# Patient Record
Sex: FEMALE | Race: BLACK OR AFRICAN AMERICAN | NOT HISPANIC OR LATINO | Employment: OTHER | URBAN - METROPOLITAN AREA
[De-identification: names, ages, dates, MRNs, and addresses within clinical notes are randomized per-mention and may not be internally consistent; named-entity substitution may affect disease eponyms.]

---

## 2018-07-31 ENCOUNTER — HOSPITAL ENCOUNTER (EMERGENCY)
Facility: HOSPITAL | Age: 58
Discharge: HOME | End: 2018-07-31
Attending: EMERGENCY MEDICINE
Payer: MEDICAID

## 2018-07-31 VITALS
OXYGEN SATURATION: 98 % | RESPIRATION RATE: 20 BRPM | DIASTOLIC BLOOD PRESSURE: 76 MMHG | WEIGHT: 180 LBS | HEIGHT: 65 IN | TEMPERATURE: 99 F | SYSTOLIC BLOOD PRESSURE: 147 MMHG | HEART RATE: 88 BPM | BODY MASS INDEX: 29.99 KG/M2

## 2018-07-31 DIAGNOSIS — J06.9 UPPER RESPIRATORY TRACT INFECTION, UNSPECIFIED TYPE: Primary | ICD-10-CM

## 2018-07-31 DIAGNOSIS — H10.33 ACUTE CONJUNCTIVITIS OF BOTH EYES, UNSPECIFIED ACUTE CONJUNCTIVITIS TYPE: ICD-10-CM

## 2018-07-31 LAB — POCT GLUCOSE: 288 MG/DL (ref 70–110)

## 2018-07-31 PROCEDURE — 99283 EMERGENCY DEPT VISIT LOW MDM: CPT | Mod: 25

## 2018-07-31 PROCEDURE — 96372 THER/PROPH/DIAG INJ SC/IM: CPT

## 2018-07-31 PROCEDURE — 63600175 PHARM REV CODE 636 W HCPCS: Performed by: EMERGENCY MEDICINE

## 2018-07-31 RX ORDER — ASPIRIN 81 MG/1
81 TABLET ORAL DAILY
COMMUNITY

## 2018-07-31 RX ORDER — ALBUTEROL SULFATE 1.25 MG/3ML
1.25 SOLUTION RESPIRATORY (INHALATION) EVERY 6 HOURS PRN
COMMUNITY
End: 2020-10-20 | Stop reason: DRUGHIGH

## 2018-07-31 RX ORDER — DEXAMETHASONE SODIUM PHOSPHATE 4 MG/ML
2 INJECTION, SOLUTION INTRA-ARTICULAR; INTRALESIONAL; INTRAMUSCULAR; INTRAVENOUS; SOFT TISSUE
Status: COMPLETED | OUTPATIENT
Start: 2018-07-31 | End: 2018-07-31

## 2018-07-31 RX ORDER — BENZONATATE 100 MG/1
100 CAPSULE ORAL 3 TIMES DAILY PRN
Qty: 20 CAPSULE | Refills: 0 | Status: SHIPPED | OUTPATIENT
Start: 2018-07-31 | End: 2018-08-10

## 2018-07-31 RX ORDER — SULFACETAMIDE SODIUM 100 MG/ML
2 SOLUTION/ DROPS OPHTHALMIC 4 TIMES DAILY
Qty: 1 BOTTLE | Status: SHIPPED | OUTPATIENT
Start: 2018-07-31 | End: 2019-05-09

## 2018-07-31 RX ORDER — MELOXICAM 7.5 MG/1
7.5 TABLET ORAL DAILY
COMMUNITY
End: 2021-07-20

## 2018-07-31 RX ORDER — ENALAPRIL MALEATE 2.5 MG/1
2.5 TABLET ORAL DAILY
COMMUNITY
End: 2020-10-20 | Stop reason: DRUGHIGH

## 2018-07-31 RX ORDER — METFORMIN HYDROCHLORIDE 1000 MG/1
1000 TABLET, FILM COATED, EXTENDED RELEASE ORAL
COMMUNITY
End: 2022-04-04

## 2018-07-31 RX ORDER — GABAPENTIN 300 MG/1
300 CAPSULE ORAL 3 TIMES DAILY
COMMUNITY
End: 2020-01-28

## 2018-07-31 RX ORDER — AMLODIPINE BESYLATE 5 MG/1
5 TABLET ORAL DAILY
COMMUNITY
End: 2020-10-20 | Stop reason: SDUPTHER

## 2018-07-31 RX ORDER — CETIRIZINE HYDROCHLORIDE 10 MG/1
10 TABLET ORAL DAILY
COMMUNITY

## 2018-07-31 RX ADMIN — DEXAMETHASONE SODIUM PHOSPHATE 2 MG: 4 INJECTION, SOLUTION INTRAMUSCULAR; INTRAVENOUS at 05:07

## 2018-07-31 NOTE — ED PROVIDER NOTES
"Encounter Date: 7/31/2018       History     Chief Complaint   Patient presents with    URI     Pt reports eye irritation, bilateral ear pain, sore throat that began last night. Taking tylenol cold and flu with some relief.      Chief complaint:  Sore throat  57-year-old who complains of a sore throat associated with nasal congestion and itchy eyes.  Patient's symptoms began last night.  No fever.  Patient reports sinus pressure.  She has been blowing her nose and has had yellow nasal drainage with blood streaks.  Patient took Tylenol cold and Sinus without improvement.  She denies difficulty breathing or chest pain.          Review of patient's allergies indicates:   Allergen Reactions    Codeine Other (See Comments)     "it makes me feel crazy"    Prednisone Palpitations     "it makes my heart beat fast. I can take the shot"     Past Medical History:   Diagnosis Date    Back pain     Diabetes mellitus     Hypertension      Past Surgical History:   Procedure Laterality Date    HYSTERECTOMY       History reviewed. No pertinent family history.  Social History   Substance Use Topics    Smoking status: Former Smoker    Smokeless tobacco: Not on file    Alcohol use No     Review of Systems   Constitutional: Negative for fever.   HENT: Positive for congestion, sinus pressure and sore throat.    Eyes: Positive for discharge (Yellow) and itching.   Respiratory: Positive for cough. Negative for shortness of breath.    Cardiovascular: Negative for chest pain.   Gastrointestinal: Negative for nausea.   Genitourinary: Negative for dysuria.   Musculoskeletal: Negative for back pain.   Skin: Negative for rash.   Neurological: Positive for headaches. Negative for weakness.       Physical Exam     Initial Vitals [07/31/18 1710]   BP Pulse Resp Temp SpO2   (!) 185/95 89 19 98.5 °F (36.9 °C) 100 %      MAP       --         Physical Exam    Nursing note and vitals reviewed.  Constitutional: She appears well-developed and " well-nourished.   HENT:   Head: Normocephalic and atraumatic.   Right Ear: Tympanic membrane normal.   Left Ear: Tympanic membrane normal.   Nose: Nose normal.   Mouth/Throat: Oropharynx is clear and moist. No oropharyngeal exudate, posterior oropharyngeal edema or posterior oropharyngeal erythema.   Eyes: EOM are normal. Pupils are equal, round, and reactive to light. Lids are everted and swept, no foreign bodies found. Right conjunctiva is injected. Left conjunctiva is injected.   Neck: Trachea normal, normal range of motion and full passive range of motion without pain. Neck supple.   Cardiovascular: Normal rate and regular rhythm.   Pulmonary/Chest: Breath sounds normal.   Abdominal: Soft. There is no tenderness. There is no rebound and no guarding.   Musculoskeletal: Normal range of motion.   Neurological: She is alert and oriented to person, place, and time. She has normal strength.   Skin: Skin is warm and dry.   Psychiatric: She has a normal mood and affect.         ED Course   Procedures  Labs Reviewed   POCT GLUCOSE - Abnormal; Notable for the following:        Result Value    POCT Glucose 288 (*)     All other components within normal limits   POCT GLUCOSE          Imaging Results    None          Medical Decision Making:   Initial Assessment:   57-year-old complains of URI type symptoms since yesterday.  On exam patient has mild erythema to her conjunctiva.  Lungs are clear.  No erythema to posterior pharynx  ED Management:  Patient does not require antibiotics.  She was hoping that she was going to be prescribed antibiotic.  However patient's symptoms began last night appear to be viral.  She is using Naphcon-A eyedrops and was advised to continue using these as well as Flonase.  She was also advised to use Coricidin for her symptoms. She will given 2 mg of Decadron and prescribed sulfacetamide eyedrops and Tessalon Perles                      Clinical Impression:   The primary encounter diagnosis was  Upper respiratory tract infection, unspecified type. A diagnosis of Acute conjunctivitis of both eyes, unspecified acute conjunctivitis type was also pertinent to this visit.                             Mi Anand MD  07/31/18 0832

## 2019-05-09 ENCOUNTER — HOSPITAL ENCOUNTER (OUTPATIENT)
Dept: RADIOLOGY | Facility: HOSPITAL | Age: 59
Discharge: HOME | End: 2019-05-09
Attending: INTERNAL MEDICINE
Payer: MEDICARE

## 2019-05-09 ENCOUNTER — OFFICE VISIT (OUTPATIENT)
Dept: RHEUMATOLOGY | Facility: CLINIC | Age: 59
End: 2019-05-09
Payer: MEDICARE

## 2019-05-09 VITALS
SYSTOLIC BLOOD PRESSURE: 132 MMHG | HEART RATE: 81 BPM | DIASTOLIC BLOOD PRESSURE: 77 MMHG | BODY MASS INDEX: 30.04 KG/M2 | HEIGHT: 66 IN | WEIGHT: 186.94 LBS

## 2019-05-09 DIAGNOSIS — M06.9 RHEUMATOID ARTHRITIS INVOLVING MULTIPLE SITES, UNSPECIFIED RHEUMATOID FACTOR PRESENCE: ICD-10-CM

## 2019-05-09 DIAGNOSIS — M06.9 RHEUMATOID ARTHRITIS INVOLVING MULTIPLE SITES, UNSPECIFIED RHEUMATOID FACTOR PRESENCE: Primary | ICD-10-CM

## 2019-05-09 PROCEDURE — 99204 PR OFFICE/OUTPT VISIT, NEW, LEVL IV, 45-59 MIN: ICD-10-PCS | Mod: S$GLB,,, | Performed by: INTERNAL MEDICINE

## 2019-05-09 PROCEDURE — 77077 JOINT SURVEY SINGLE VIEW: CPT | Mod: TC

## 2019-05-09 PROCEDURE — 99999 PR PBB SHADOW E&M-EST. PATIENT-LVL III: ICD-10-PCS | Mod: PBBFAC,,, | Performed by: INTERNAL MEDICINE

## 2019-05-09 PROCEDURE — 71046 X-RAY EXAM CHEST 2 VIEWS: CPT | Mod: 26,,, | Performed by: RADIOLOGY

## 2019-05-09 PROCEDURE — 99204 OFFICE O/P NEW MOD 45 MIN: CPT | Mod: S$GLB,,, | Performed by: INTERNAL MEDICINE

## 2019-05-09 PROCEDURE — 3008F PR BODY MASS INDEX (BMI) DOCUMENTED: ICD-10-PCS | Mod: CPTII,S$GLB,, | Performed by: INTERNAL MEDICINE

## 2019-05-09 PROCEDURE — 71046 XR CHEST PA AND LATERAL: ICD-10-PCS | Mod: 26,,, | Performed by: RADIOLOGY

## 2019-05-09 PROCEDURE — 77077 XR ARTHRITIS SURVEY: ICD-10-PCS | Mod: 26,,, | Performed by: RADIOLOGY

## 2019-05-09 PROCEDURE — 99999 PR PBB SHADOW E&M-EST. PATIENT-LVL III: CPT | Mod: PBBFAC,,, | Performed by: INTERNAL MEDICINE

## 2019-05-09 PROCEDURE — 77077 JOINT SURVEY SINGLE VIEW: CPT | Mod: 26,,, | Performed by: RADIOLOGY

## 2019-05-09 PROCEDURE — 71046 X-RAY EXAM CHEST 2 VIEWS: CPT | Mod: TC

## 2019-05-09 PROCEDURE — 3008F BODY MASS INDEX DOCD: CPT | Mod: CPTII,S$GLB,, | Performed by: INTERNAL MEDICINE

## 2019-05-09 RX ORDER — METHOTREXATE 2.5 MG/1
TABLET ORAL
Refills: 0 | COMMUNITY
Start: 2019-04-03 | End: 2020-10-20

## 2019-05-09 RX ORDER — ADALIMUMAB 40MG/0.8ML
40 KIT SUBCUTANEOUS
Qty: 2 PEN | Refills: 11 | Status: SHIPPED | OUTPATIENT
Start: 2019-05-09 | End: 2020-01-28

## 2019-05-09 RX ORDER — PREDNISONE 10 MG/1
TABLET ORAL
Qty: 100 TABLET | Refills: 0 | Status: SHIPPED | OUTPATIENT
Start: 2019-05-09 | End: 2020-10-20

## 2019-05-09 RX ORDER — OMEPRAZOLE 40 MG/1
CAPSULE, DELAYED RELEASE ORAL
Refills: 5 | COMMUNITY
Start: 2019-04-10

## 2019-05-09 RX ORDER — ATORVASTATIN CALCIUM 20 MG/1
TABLET, FILM COATED ORAL
Refills: 1 | COMMUNITY
Start: 2019-04-06 | End: 2022-08-15

## 2019-05-09 RX ORDER — PEN NEEDLE, DIABETIC 31 GX5/16"
NEEDLE, DISPOSABLE MISCELLANEOUS
Refills: 11 | COMMUNITY
Start: 2019-04-30 | End: 2022-04-04 | Stop reason: SDUPTHER

## 2019-05-09 RX ORDER — INSULIN ASPART 100 [IU]/ML
INJECTION, SOLUTION INTRAVENOUS; SUBCUTANEOUS
Refills: 3 | COMMUNITY
Start: 2019-05-01 | End: 2022-04-04

## 2019-05-09 RX ORDER — INSULIN GLARGINE 100 [IU]/ML
INJECTION, SOLUTION SUBCUTANEOUS
Refills: 5 | COMMUNITY
Start: 2019-04-26 | End: 2022-03-17

## 2019-05-09 ASSESSMENT — ROUTINE ASSESSMENT OF PATIENT INDEX DATA (RAPID3)
PATIENT GLOBAL ASSESSMENT SCORE: 8
FATIGUE SCORE: 8
WHEN YOU AWAKENED IN THE MORNING OVER THE LAST WEEK, PLEASE INDICATE THE AMOUNT OF TIME IT TAKES UNTIL YOU ARE AS LIMBER AS YOU WILL BE FOR THE DAY: 2-4 HOURS
AM STIFFNESS SCORE: 1, YES
MDHAQ FUNCTION SCORE: 1.7
PSYCHOLOGICAL DISTRESS SCORE: 8

## 2019-05-09 NOTE — PROGRESS NOTES
Chief Complaint   Patient presents with    Disease Management       Patient was referred by : PCP    History of presenting illness    58 year old black female comes in with a new diagnosis of rheumatoid arthritis    She has joint pains for 2 years    Feet hurt  Hands hurt  Neck hurts    Elbows,shoulders,wrists can hurt some  Back can hurt some    Pain,swelling,stiffness+  EMS+    Right 2nd finger is stiff and doesn't bend    No skin rashes,malar rash,photosensitivity  No telangiectasias  No calcinosis     No patchy alopecia  No oral and nasal ulcers  No sicca symptoms   No pleurisy or any cardiopulmonary complaints  No dysphagia,diplopia and dysphonia and muscle weakness  No n/v/d/c  No acid reflux+  No raynaud's+  No digital ulcers     No cytopenias  No renal issues  No blood clots     No fever,chills,night sweats,weight loss and loss of appetite     No pregnancy losses    A PCP diagnosed her with rheumatoid arthritis  Sent her to rheumatology on napolean : got mtx : 4 pills weekly/folic acid  She has headaches and she cant tolerate it   She had a rash    Past history : dm,htn    Family history : mom has arthritis     Social history : former smoker,quit 20 years ago        Review of Systems   Constitutional: Negative for activity change, appetite change, chills, diaphoresis, fatigue, fever and unexpected weight change.   HENT: Negative for congestion, dental problem, drooling, ear discharge, ear pain, facial swelling, hearing loss, mouth sores, nosebleeds, postnasal drip, rhinorrhea, sinus pressure, sinus pain, sneezing, sore throat, tinnitus, trouble swallowing and voice change.    Eyes: Negative for photophobia, pain, discharge, redness, itching and visual disturbance.   Respiratory: Negative for apnea, cough, choking, chest tightness, shortness of breath, wheezing and stridor.    Cardiovascular: Negative for chest pain, palpitations and leg swelling.   Gastrointestinal: Negative for abdominal distention,  abdominal pain, anal bleeding, blood in stool, constipation, diarrhea, nausea, rectal pain and vomiting.   Endocrine: Negative for cold intolerance, heat intolerance, polydipsia, polyphagia and polyuria.   Genitourinary: Negative for decreased urine volume, difficulty urinating, dysuria, enuresis, flank pain, frequency, genital sores, hematuria and urgency.   Musculoskeletal: Positive for arthralgias. Negative for back pain, gait problem, joint swelling, myalgias, neck pain and neck stiffness.   Skin: Negative for color change, pallor, rash and wound.   Allergic/Immunologic: Negative for environmental allergies, food allergies and immunocompromised state.   Neurological: Negative for dizziness, tremors, seizures, syncope, facial asymmetry, speech difficulty, weakness, light-headedness, numbness and headaches.   Hematological: Negative for adenopathy. Does not bruise/bleed easily.   Psychiatric/Behavioral: Negative for agitation, behavioral problems, confusion, decreased concentration, dysphoric mood, hallucinations, self-injury, sleep disturbance and suicidal ideas. The patient is not nervous/anxious and is not hyperactive.      Physical Exam     Tenderness:   RUE: glenohumeral  LUE: glenohumeral  Right hand: 2nd MCP, 3rd MCP, 4th MCP, 5th MCP, 2nd PIP, 3rd PIP and 4th PIP  Left hand: 2nd MCP, 3rd MCP, 4th MCP, 5th MCP, 2nd PIP, 3rd PIP and 4th PIP  LLE: tibiotalar    Swelling:   Right hand: 2nd PIP  Left hand: 2nd MCP, 3rd MCP, 3rd PIP and 4th PIP  LLE: tibiotalar    RAMIREZ-28 tender joint count: 16  RAMIREZ-28 swollen joint count: 5    PAIN 8  FATIGUE 8  PTGL 8  EMS All day    Tender neck with limited ROM    Physical Exam   Constitutional: She is oriented to person, place, and time and well-developed, well-nourished, and in no distress. No distress.   HENT:   Head: Normocephalic.   Mouth/Throat: Oropharynx is clear and moist.   Eyes: Conjunctivae are normal. Pupils are equal, round, and reactive to light. Right eye  exhibits no discharge. Left eye exhibits no discharge. No scleral icterus.   Neck: Normal range of motion. No thyromegaly present.   Cardiovascular: Normal rate, regular rhythm, normal heart sounds and intact distal pulses.    Pulmonary/Chest: Effort normal and breath sounds normal. No stridor.   Abdominal: Soft. Bowel sounds are normal.   Lymphadenopathy:     She has no cervical adenopathy.   Neurological: She is alert and oriented to person, place, and time.   Skin: Skin is warm. No rash noted. She is not diaphoretic.     Psychiatric: Affect and judgment normal.   Musculoskeletal: Normal range of motion.         Assessment     58 year old black female with DM,HTN comes in with a new diagnosis of rheumatoid arthritis    She has joint pains for 2 years    Feet hurt  Hands hurt  Neck hurts    Elbows,shoulders,wrists can hurt some  Back can hurt some    Pain,swelling,stiffness+  EMS+    Right 2nd finger is stiff and doesn't bend    A PCP diagnosed her with rheumatoid arthritis  Sent her to rheumatology on napolean : got mtx : 4 pills weekly/folic acid  She has headaches and she cant tolerate it and has a rash with it     She needs to establish care for rheumatoid arthritis   We need to treat her with dmards  She has very high disease activity    1. Rheumatoid arthritis involving multiple sites, unspecified rheumatoid factor presence            New problem     Plan      Autoimmune labs     Predmard labs     Arthritis survey  CXR    Prednisone 40 mg daily and taper   Humira 40 mg alternate weekly   Discussed side effects    Marisol was seen today for disease management.    Diagnoses and all orders for this visit:    Rheumatoid arthritis involving multiple sites, unspecified rheumatoid factor presence  -     CBC auto differential; Future  -     Comprehensive metabolic panel; Future  -     Sedimentation rate; Future  -     C-reactive protein; Future  -     Rheumatoid factor; Future  -     Cyclic citrul peptide antibody, IgG;  Future  -     HLA B27 Antigen; Future  -     HIV 1/2 Ag/Ab (4th Gen); Future  -     Hepatitis B surface antigen; Future  -     HBcAB; Future  -     Hepatitis B surface antibody; Future  -     Hepatitis C antibody; Future  -     Hepatitis A antibody, IgG; Future  -     Strongyloides IgG Antibodies; Future  -     Quantiferon Gold TB; Future  -     RPR; Future  -     Varicella zoster antibody, IgG; Future  -     XR Arthritis Survey; Future  -     X-Ray Chest PA And Lateral; Future  -     Uric acid; Future  -     ULISES Screen w/Reflex; Future  -     Sjogrens syndrome-A extractable nuclear antibody; Future  -     adalimumab (HUMIRA PEN) 40 mg/0.8 mL PnKt; Inject 1 pen (40 mg total) into the skin every 14 (fourteen) days.    Other orders  -     predniSONE (DELTASONE) 10 MG tablet; 40 mg daily for a week 30 mg daily for a week 20 mg daily for a week 10 mg daily for a week        rtc in 3 months

## 2019-05-10 ENCOUNTER — TELEPHONE (OUTPATIENT)
Dept: PHARMACY | Facility: CLINIC | Age: 59
End: 2019-05-10

## 2019-05-14 NOTE — PROGRESS NOTES
Patient cant take prednisone  Suggested to wait till she gets the humira  PCP can give a steroid shot at the office

## 2019-05-14 NOTE — TELEPHONE ENCOUNTER
DOCUMENTATION ONLY:  Prior authorization for Humira approved until 12/31/19    Co-pay: $3.80    Patient Assistance is not required.

## 2019-05-17 ENCOUNTER — TELEPHONE (OUTPATIENT)
Dept: PHARMACY | Facility: CLINIC | Age: 59
End: 2019-05-17

## 2019-05-17 NOTE — TELEPHONE ENCOUNTER
Initial Humira 40mg/0.8ml Pen Injection consult completed in-person on .Humira 40mg/0.8ml Pen Injection was picked up on . $3.80 copay. Patient will start Humira 40mg/0.8ml Pen Injection on . Address confirmed. Confirmed 2 patient identifiers - name and . Therapy Appropriate.    Counseled patient on administration directions:  -  Inject Humira 40mg (1pen) into the skin every 14 days.- Take out of the refrigerator 30-60 minutes prior to injection.  - Wash hands before and after injection.  - Monthly RX will come with gauze, bandaids, and alcohol swabs.  - Patient may inject in either the tops of the thighs, abdomen- but at least 2 inches away from her belly button, or the outer part of her upper arm.  Patient was instructed to rotate injections sites.  - Patient is to wipe down the injection site with the alcohol pad, wait to dry.  Gently squeeze the area of the cleaned skin and hold it firmly.   Place the pen flat against the raised area of skin that is being squeezed, then push down on the button and hold for 10-15 seconds, until the window has gone from clear to yellow.  - Patient should rotate injection sites.   - Patient will use sharps container; once full, per [place] law, she may lock the sharps container and place in her trash. She can then contact the Pharmacy and we will replace the sharps at no additional charge.    Patient was counseled on possible side effects:  - Injection site reaction: redness, soreness, itching, bruising, which should resolve within 3-5 days.   - Increased risk for infections.  If patient becomes sick, patient is to hold Humira  use until she is better.     Patient verbalized understanding. Compliance stressed. Patient advised to keep a calendar marking dates of injections to ensure better compliance. Patient advised to call myself or provider should any questions arise. Patient plans to start Humira on . Consultation included: indication; goals of treatment;  administration; storage and handling; side effects; how to handle side effects; the importance of compliance; how to handle missed doses; the importance of laboratory monitoring; the importance of keeping all follow up appointments.  Patient understands to report any medication changes to [facility] and provider. All questions answered and addressed to patients satisfaction. Rph will f/u with her in 1 week from start, [facility] to contact patient in 3 weeks for refills.

## 2019-05-30 DIAGNOSIS — M05.79 RHEUMATOID ARTHRITIS INVOLVING MULTIPLE SITES WITH POSITIVE RHEUMATOID FACTOR: Primary | ICD-10-CM

## 2019-05-30 RX ORDER — ADALIMUMAB 40MG/0.8ML
40 KIT SUBCUTANEOUS
Qty: 1 PEN | Refills: 0 | Status: SHIPPED | OUTPATIENT
Start: 2019-05-30 | End: 2019-06-29

## 2019-06-12 ENCOUNTER — TELEPHONE (OUTPATIENT)
Dept: PHARMACY | Facility: CLINIC | Age: 59
End: 2019-06-12

## 2019-07-02 ENCOUNTER — OFFICE VISIT (OUTPATIENT)
Dept: INFECTIOUS DISEASES | Facility: CLINIC | Age: 59
End: 2019-07-02
Payer: MEDICARE

## 2019-07-02 ENCOUNTER — CLINICAL SUPPORT (OUTPATIENT)
Dept: INFECTIOUS DISEASES | Facility: CLINIC | Age: 59
End: 2019-07-02
Payer: MEDICARE

## 2019-07-02 VITALS
BODY MASS INDEX: 30.62 KG/M2 | DIASTOLIC BLOOD PRESSURE: 83 MMHG | HEART RATE: 89 BPM | WEIGHT: 190.5 LBS | SYSTOLIC BLOOD PRESSURE: 143 MMHG | TEMPERATURE: 98 F | HEIGHT: 66 IN

## 2019-07-02 DIAGNOSIS — Z23 NEED FOR VACCINATION WITH TWINRIX: ICD-10-CM

## 2019-07-02 DIAGNOSIS — Z23 NEED FOR ZOSTER VACCINATION: ICD-10-CM

## 2019-07-02 DIAGNOSIS — M05.742 RHEUMATOID ARTHRITIS INVOLVING BOTH HANDS WITH POSITIVE RHEUMATOID FACTOR: ICD-10-CM

## 2019-07-02 DIAGNOSIS — M05.741 RHEUMATOID ARTHRITIS INVOLVING BOTH HANDS WITH POSITIVE RHEUMATOID FACTOR: ICD-10-CM

## 2019-07-02 DIAGNOSIS — Z23 NEED FOR TETANUS BOOSTER: ICD-10-CM

## 2019-07-02 DIAGNOSIS — Z23 NEED FOR STREPTOCOCCUS PNEUMONIAE VACCINATION: ICD-10-CM

## 2019-07-02 PROCEDURE — G0009 TD VACCINE GREATER THAN OR EQUAL TO 7YO PRESERVATIVE FREE IM: ICD-10-PCS | Mod: 59,S$GLB,, | Performed by: INTERNAL MEDICINE

## 2019-07-02 PROCEDURE — 90636 HEP A/HEP B VACC ADULT IM: CPT | Mod: S$GLB,,, | Performed by: INTERNAL MEDICINE

## 2019-07-02 PROCEDURE — 3008F PR BODY MASS INDEX (BMI) DOCUMENTED: ICD-10-PCS | Mod: CPTII,S$GLB,, | Performed by: INTERNAL MEDICINE

## 2019-07-02 PROCEDURE — 90670 PCV13 VACCINE IM: CPT | Mod: S$GLB,,, | Performed by: INTERNAL MEDICINE

## 2019-07-02 PROCEDURE — 90472 PNEUMOCOCCAL CONJUGATE VACCINE 13-VALENT LESS THAN 5YO & GREATER THAN: ICD-10-PCS | Mod: S$GLB,,, | Performed by: INTERNAL MEDICINE

## 2019-07-02 PROCEDURE — 3008F BODY MASS INDEX DOCD: CPT | Mod: CPTII,S$GLB,, | Performed by: INTERNAL MEDICINE

## 2019-07-02 PROCEDURE — 90472 IMMUNIZATION ADMIN EACH ADD: CPT | Mod: S$GLB,,, | Performed by: INTERNAL MEDICINE

## 2019-07-02 PROCEDURE — 90636 HEPATITIS A HEPATITIS B COMBINED VACCINE IM: ICD-10-PCS | Mod: S$GLB,,, | Performed by: INTERNAL MEDICINE

## 2019-07-02 PROCEDURE — 90471 HEPATITIS A HEPATITIS B COMBINED VACCINE IM: ICD-10-PCS | Mod: S$GLB,,, | Performed by: INTERNAL MEDICINE

## 2019-07-02 PROCEDURE — 99999 PR PBB SHADOW E&M-EST. PATIENT-LVL IV: CPT | Mod: PBBFAC,,, | Performed by: INTERNAL MEDICINE

## 2019-07-02 PROCEDURE — G0009 ADMIN PNEUMOCOCCAL VACCINE: HCPCS | Mod: 59,S$GLB,, | Performed by: INTERNAL MEDICINE

## 2019-07-02 PROCEDURE — 90714 TD VACCINE GREATER THAN OR EQUAL TO 7YO PRESERVATIVE FREE IM: ICD-10-PCS | Mod: S$GLB,,, | Performed by: INTERNAL MEDICINE

## 2019-07-02 PROCEDURE — 90714 TD VACC NO PRESV 7 YRS+ IM: CPT | Mod: S$GLB,,, | Performed by: INTERNAL MEDICINE

## 2019-07-02 PROCEDURE — 99999 PR PBB SHADOW E&M-EST. PATIENT-LVL IV: ICD-10-PCS | Mod: PBBFAC,,, | Performed by: INTERNAL MEDICINE

## 2019-07-02 PROCEDURE — 90670 PNEUMOCOCCAL CONJUGATE VACCINE 13-VALENT LESS THAN 5YO & GREATER THAN: ICD-10-PCS | Mod: S$GLB,,, | Performed by: INTERNAL MEDICINE

## 2019-07-02 PROCEDURE — 90471 IMMUNIZATION ADMIN: CPT | Mod: S$GLB,,, | Performed by: INTERNAL MEDICINE

## 2019-07-02 PROCEDURE — 99203 PR OFFICE/OUTPT VISIT, NEW, LEVL III, 30-44 MIN: ICD-10-PCS | Mod: 25,S$GLB,, | Performed by: INTERNAL MEDICINE

## 2019-07-02 PROCEDURE — 99203 OFFICE O/P NEW LOW 30 MIN: CPT | Mod: 25,S$GLB,, | Performed by: INTERNAL MEDICINE

## 2019-07-02 NOTE — PROGRESS NOTES
received Td, Prevnar 13 and Twinrix(first dose) vaccine. Tolerated well. Return appointment scheduled. Left unit in NAD.

## 2019-07-02 NOTE — PROGRESS NOTES
Subjective:      Patient ID: Marisol Rosales is a 58 y.o. female.    Chief Complaint:Immunizations      History of Present Illness    Recently started Humira.  She is taking her 5th dose this Wednesday.  She is sent for immunization evaluation.    Review of Systems   Constitution: Negative for chills, decreased appetite, fever, malaise/fatigue, night sweats, weight gain and weight loss.   HENT: Negative for congestion, ear pain, hearing loss, hoarse voice, sore throat and tinnitus.    Eyes: Negative for blurred vision, redness and visual disturbance.   Cardiovascular: Negative for chest pain, leg swelling and palpitations.   Respiratory: Positive for wheezing. Negative for cough, hemoptysis, shortness of breath and sputum production.    Hematologic/Lymphatic: Negative for adenopathy. Does not bruise/bleed easily.   Skin: Negative for dry skin, itching, rash and suspicious lesions.   Musculoskeletal: Positive for joint pain. Negative for myalgias and neck pain.   Gastrointestinal: Negative for abdominal pain, constipation, diarrhea, heartburn, nausea and vomiting.   Genitourinary: Negative for dysuria, flank pain, frequency, hematuria, hesitancy and urgency.   Neurological: Negative for dizziness, headaches, numbness, paresthesias and weakness.   Psychiatric/Behavioral: Negative for depression and memory loss. The patient does not have insomnia and is not nervous/anxious.      Objective:   Physical Exam   Constitutional: She appears well-developed and well-nourished.   HENT:   Head: Normocephalic and atraumatic.   Eyes: Pupils are equal, round, and reactive to light. Conjunctivae and EOM are normal. No scleral icterus.   Cardiovascular: Normal rate, regular rhythm and normal heart sounds.   Pulmonary/Chest: Effort normal and breath sounds normal.   Abdominal: Soft. Bowel sounds are normal.   Musculoskeletal:   Tenderness and effusion are noted in both hands at the MCP joints.   Skin: Skin is warm.   Nursing note and  vitals reviewed.    Assessment:       1. Rheumatoid arthritis involving both hands with positive rheumatoid factor    2. Need for Streptococcus pneumoniae vaccination    3. Need for vaccination with Twinrix    4. Need for zoster vaccination    5. Need for tetanus booster          Plan:       Vaccination with Prevnar 13, Twinrix, tetanus booster today.  She is given a prescription for Shingrix vaccination.  Her laboratories were all reviewed; there is no evidence of infection with tuberculosis or Strongyloides requiring treatment.  She may proceed with Humira treatment.

## 2019-07-02 NOTE — LETTER
July 2, 2019      Curly Esparza MD  [address] [facility] [address]           [facility] - Infectious Diseases  [address]  [place] [address]  Phone: [phone]  Fax: [phone]          Patient: Marisol Rosales   MR Number: [id number]   YOB: 1960   Date of Visit: 7/2/2019       Dear Dr. Curly Esparza:    Thank you for referring Marisol Rosales to me for evaluation. Attached you will find relevant portions of my assessment and plan of care.    If you have questions, please do not hesitate to call me. I look forward to following Marisol Rosales along with you.    Sincerely,    Kana Pablo MD    Enclosure  CC:  No Recipients    If you would like to receive this communication electronically, please contact [email or web address] or [phone] to request more information on [facility] Link access.    For providers and/or their staff who would like to refer a patient to [facility], please contact us through our one-stop-shop provider referral line, [facility], at [phone].    If you feel you have received this communication in error or would no longer like to receive these types of communications, please e-mail [email or web address]

## 2019-08-06 ENCOUNTER — CLINICAL SUPPORT (OUTPATIENT)
Dept: INFECTIOUS DISEASES | Facility: CLINIC | Age: 59
End: 2019-08-06
Payer: MEDICARE

## 2019-08-06 PROCEDURE — 90636 HEPATITIS A HEPATITIS B COMBINED VACCINE IM: ICD-10-PCS | Mod: S$GLB,,, | Performed by: INTERNAL MEDICINE

## 2019-08-06 PROCEDURE — 90636 HEP A/HEP B VACC ADULT IM: CPT | Mod: S$GLB,,, | Performed by: INTERNAL MEDICINE

## 2019-08-06 PROCEDURE — 90471 HEPATITIS A HEPATITIS B COMBINED VACCINE IM: ICD-10-PCS | Mod: S$GLB,,, | Performed by: INTERNAL MEDICINE

## 2019-08-06 PROCEDURE — 90471 IMMUNIZATION ADMIN: CPT | Mod: S$GLB,,, | Performed by: INTERNAL MEDICINE

## 2019-08-07 ENCOUNTER — TELEPHONE (OUTPATIENT)
Dept: PHARMACY | Facility: CLINIC | Age: 59
End: 2019-08-07

## 2019-08-07 NOTE — TELEPHONE ENCOUNTER
Pt plans to pickup Humira on . Name and  verified. $0 copay in 004. Pt is not in need of a new sharps container. Pt has 0 doses on hand, next dose due . Pt reported no missed doses. Pt did not start any new medications. Pt had no further questions or concerns.

## 2019-08-20 ENCOUNTER — OFFICE VISIT (OUTPATIENT)
Dept: PODIATRY | Facility: CLINIC | Age: 59
End: 2019-08-20
Payer: MEDICARE

## 2019-08-20 ENCOUNTER — HOSPITAL ENCOUNTER (OUTPATIENT)
Dept: RADIOLOGY | Facility: HOSPITAL | Age: 59
Discharge: HOME | End: 2019-08-20
Attending: PODIATRIST
Payer: MEDICARE

## 2019-08-20 VITALS
WEIGHT: 189 LBS | BODY MASS INDEX: 31.49 KG/M2 | HEIGHT: 65 IN | DIASTOLIC BLOOD PRESSURE: 83 MMHG | HEART RATE: 81 BPM | SYSTOLIC BLOOD PRESSURE: 149 MMHG

## 2019-08-20 DIAGNOSIS — M20.42 HAMMER TOES OF BOTH FEET: ICD-10-CM

## 2019-08-20 DIAGNOSIS — M20.41 HAMMER TOES OF BOTH FEET: ICD-10-CM

## 2019-08-20 DIAGNOSIS — M20.41 HAMMER TOES OF BOTH FEET: Primary | ICD-10-CM

## 2019-08-20 DIAGNOSIS — M20.42 HAMMER TOES OF BOTH FEET: Primary | ICD-10-CM

## 2019-08-20 PROCEDURE — 3008F BODY MASS INDEX DOCD: CPT | Mod: CPTII,S$GLB,, | Performed by: PODIATRIST

## 2019-08-20 PROCEDURE — 73630 X-RAY EXAM OF FOOT: CPT | Mod: 26,50,, | Performed by: RADIOLOGY

## 2019-08-20 PROCEDURE — 3008F PR BODY MASS INDEX (BMI) DOCUMENTED: ICD-10-PCS | Mod: CPTII,S$GLB,, | Performed by: PODIATRIST

## 2019-08-20 PROCEDURE — 99203 PR OFFICE/OUTPT VISIT, NEW, LEVL III, 30-44 MIN: ICD-10-PCS | Mod: S$GLB,,, | Performed by: PODIATRIST

## 2019-08-20 PROCEDURE — 73630 X-RAY EXAM OF FOOT: CPT | Mod: 50,TC

## 2019-08-20 PROCEDURE — 99999 PR PBB SHADOW E&M-EST. PATIENT-LVL III: CPT | Mod: PBBFAC,,, | Performed by: PODIATRIST

## 2019-08-20 PROCEDURE — 73630 XR FOOT COMPLETE 3 VIEW BILATERAL: ICD-10-PCS | Mod: 26,50,, | Performed by: RADIOLOGY

## 2019-08-20 PROCEDURE — 99999 PR PBB SHADOW E&M-EST. PATIENT-LVL III: ICD-10-PCS | Mod: PBBFAC,,, | Performed by: PODIATRIST

## 2019-08-20 PROCEDURE — 99203 OFFICE O/P NEW LOW 30 MIN: CPT | Mod: S$GLB,,, | Performed by: PODIATRIST

## 2019-08-20 RX ORDER — DICLOFENAC SODIUM 10 MG/G
2 GEL TOPICAL 4 TIMES DAILY
Qty: 1 TUBE | Refills: 2 | Status: SHIPPED | OUTPATIENT
Start: 2019-08-20 | End: 2020-01-28

## 2019-08-22 NOTE — PROGRESS NOTES
Subjective:      Patient ID: Marisol Rosales is a 59 y.o. female.    Chief Complaint: Ingrown Toenail (side of 2 nd toe trim nail); Diabetes Mellitus; and Foot Injury (lt 2nd toe )    Marisol is a 59 y.o. female who presents to the podiatry clinic  with complaint of  left foot pain. Onset of the symptoms was today. Precipitating event: none known. Current symptoms include: ability to bear weight, but with some pain. Aggravating factors: any weight bearing. Symptoms have gradually worsened. Patient has had no prior foot problems. Evaluation to date: none. Treatment to date: none. Patients rates pain 7/10 on pain scale.        Review of Systems   Constitution: Negative for chills and fever.   HENT: Negative for congestion and tinnitus.    Eyes: Negative for double vision and visual disturbance.   Cardiovascular: Negative for chest pain and claudication.   Respiratory: Negative for hemoptysis and shortness of breath.    Endocrine: Negative for cold intolerance and heat intolerance.   Hematologic/Lymphatic: Negative for adenopathy and bleeding problem.   Skin: Negative for color change and nail changes.   Musculoskeletal: Positive for joint pain, joint swelling and stiffness. Negative for myalgias.   Gastrointestinal: Negative for nausea and vomiting.   Genitourinary: Negative for dysuria and hematuria.   Neurological: Negative for numbness and paresthesias.   Psychiatric/Behavioral: Negative for altered mental status and suicidal ideas.   Allergic/Immunologic: Negative for environmental allergies and persistent infections.           Objective:      Physical Exam   Constitutional: She is oriented to person, place, and time. She appears well-developed and well-nourished.   Cardiovascular:   Pulses:       Dorsalis pedis pulses are 2+ on the right side, and 2+ on the left side.        Posterior tibial pulses are 2+ on the right side, and 2+ on the left side.   Pulmonary/Chest: Effort normal.   Musculoskeletal: Normal range of  motion.   Inspection and palpation of the muscles joints and bones of both lower extremities reveal that muscle strength for the anterior lateral and posterior muscle groups and intrinsic muscle groups of the foot are all 5 over 5 symmetrical.  Ankle subtalar midtarsal and digital joint range of motion are within normal limits, nonpainful, without crepitus or effusion.   Tenderness to bilateral 2nd digits overlying the proximal interphalangeal joint and dorsal contracture noted of the metatarsophalangeal joints.   Neurological: She is alert and oriented to person, place, and time.   Sharp dull light touch vibratory proprioceptive sensation are intact bilaterally.  Deep tendon reflexes to patellar and Achilles tendon are symmetrical 2 over 4 bilaterally.  No ankle clonus or Babinski reflexes noted bilaterally.  Coordination is normal to both feet and lower extremities.   Skin: Skin is warm and dry. Capillary refill takes 2 to 3 seconds.   Skin turgor is normal bilaterally.  Skin texture is well hydrated to both lower extremities.  No lesions or rashes or wounds appreciated bilaterally.  Nail plates 1 through 5 bilaterally are within normal limits for length and thickness.  No nail clubbing or incurvation noted.   Psychiatric: She has a normal mood and affect.   Vitals reviewed.            Assessment:       Encounter Diagnosis   Name Primary?    Hammer toes of both feet Yes         Plan:       Marisol was seen today for ingrown toenail, diabetes mellitus and foot injury.    Diagnoses and all orders for this visit:    Hammer toes of both feet  -     X-Ray Foot Complete Bilateral; Future  -     diclofenac sodium (VOLTAREN) 1 % Gel; Apply 2 g topically 4 (four) times daily.      I counseled the patient on her conditions, their implications and medical management.      Follow-up for 2nd toe procedure, possible ingrown nail removal as well as tenotomy/possibly extensor secondary to a dorsal contracture and extension.  .

## 2019-08-27 ENCOUNTER — OFFICE VISIT (OUTPATIENT)
Dept: RHEUMATOLOGY | Facility: CLINIC | Age: 59
End: 2019-08-27
Payer: MEDICARE

## 2019-08-27 ENCOUNTER — LAB VISIT (OUTPATIENT)
Dept: LAB | Facility: HOSPITAL | Age: 59
End: 2019-08-27
Attending: INTERNAL MEDICINE
Payer: MEDICARE

## 2019-08-27 VITALS
DIASTOLIC BLOOD PRESSURE: 77 MMHG | HEIGHT: 65 IN | SYSTOLIC BLOOD PRESSURE: 136 MMHG | HEART RATE: 89 BPM | WEIGHT: 196.88 LBS | BODY MASS INDEX: 32.8 KG/M2

## 2019-08-27 DIAGNOSIS — M05.79 RHEUMATOID ARTHRITIS INVOLVING MULTIPLE SITES WITH POSITIVE RHEUMATOID FACTOR: Primary | ICD-10-CM

## 2019-08-27 DIAGNOSIS — M05.79 RHEUMATOID ARTHRITIS INVOLVING MULTIPLE SITES WITH POSITIVE RHEUMATOID FACTOR: ICD-10-CM

## 2019-08-27 LAB
ALBUMIN SERPL BCP-MCNC: 3.8 G/DL (ref 3.5–5.2)
ALP SERPL-CCNC: 135 U/L (ref 55–135)
ALT SERPL W/O P-5'-P-CCNC: 18 U/L (ref 10–44)
ANION GAP SERPL CALC-SCNC: 8 MMOL/L (ref 8–16)
AST SERPL-CCNC: 16 U/L (ref 10–40)
BASOPHILS # BLD AUTO: 0.03 K/UL (ref 0–0.2)
BASOPHILS NFR BLD: 0.5 % (ref 0–1.9)
BILIRUB SERPL-MCNC: 0.3 MG/DL (ref 0.1–1)
BUN SERPL-MCNC: 15 MG/DL (ref 6–20)
CALCIUM SERPL-MCNC: 9.1 MG/DL (ref 8.7–10.5)
CHLORIDE SERPL-SCNC: 110 MMOL/L (ref 95–110)
CO2 SERPL-SCNC: 25 MMOL/L (ref 23–29)
CREAT SERPL-MCNC: 0.8 MG/DL (ref 0.5–1.4)
CRP SERPL-MCNC: 1.2 MG/L (ref 0–8.2)
DIFFERENTIAL METHOD: ABNORMAL
EOSINOPHIL # BLD AUTO: 0.2 K/UL (ref 0–0.5)
EOSINOPHIL NFR BLD: 2.7 % (ref 0–8)
ERYTHROCYTE [DISTWIDTH] IN BLOOD BY AUTOMATED COUNT: 15 % (ref 11.5–14.5)
ERYTHROCYTE [SEDIMENTATION RATE] IN BLOOD BY WESTERGREN METHOD: 13 MM/HR (ref 0–36)
EST. GFR  (AFRICAN AMERICAN): >60 ML/MIN/1.73 M^2
EST. GFR  (NON AFRICAN AMERICAN): >60 ML/MIN/1.73 M^2
GLUCOSE SERPL-MCNC: 156 MG/DL (ref 70–110)
HCT VFR BLD AUTO: 38.4 % (ref 37–48.5)
HGB BLD-MCNC: 11.8 G/DL (ref 12–16)
IMM GRANULOCYTES # BLD AUTO: 0.01 K/UL (ref 0–0.04)
IMM GRANULOCYTES NFR BLD AUTO: 0.2 % (ref 0–0.5)
LYMPHOCYTES # BLD AUTO: 2.6 K/UL (ref 1–4.8)
LYMPHOCYTES NFR BLD: 44.2 % (ref 18–48)
MCH RBC QN AUTO: 24.6 PG (ref 27–31)
MCHC RBC AUTO-ENTMCNC: 30.7 G/DL (ref 32–36)
MCV RBC AUTO: 80 FL (ref 82–98)
MONOCYTES # BLD AUTO: 0.5 K/UL (ref 0.3–1)
MONOCYTES NFR BLD: 7.6 % (ref 4–15)
NEUTROPHILS # BLD AUTO: 2.7 K/UL (ref 1.8–7.7)
NEUTROPHILS NFR BLD: 44.8 % (ref 38–73)
NRBC BLD-RTO: 0 /100 WBC
PLATELET # BLD AUTO: 302 K/UL (ref 150–350)
PMV BLD AUTO: 9.8 FL (ref 9.2–12.9)
POTASSIUM SERPL-SCNC: 3.7 MMOL/L (ref 3.5–5.1)
PROT SERPL-MCNC: 7.5 G/DL (ref 6–8.4)
RBC # BLD AUTO: 4.8 M/UL (ref 4–5.4)
SODIUM SERPL-SCNC: 143 MMOL/L (ref 136–145)
WBC # BLD AUTO: 5.95 K/UL (ref 3.9–12.7)

## 2019-08-27 PROCEDURE — 86140 C-REACTIVE PROTEIN: CPT

## 2019-08-27 PROCEDURE — 99214 PR OFFICE/OUTPT VISIT, EST, LEVL IV, 30-39 MIN: ICD-10-PCS | Mod: S$GLB,,, | Performed by: INTERNAL MEDICINE

## 2019-08-27 PROCEDURE — 99999 PR PBB SHADOW E&M-EST. PATIENT-LVL III: CPT | Mod: PBBFAC,,, | Performed by: INTERNAL MEDICINE

## 2019-08-27 PROCEDURE — 85652 RBC SED RATE AUTOMATED: CPT

## 2019-08-27 PROCEDURE — 3008F BODY MASS INDEX DOCD: CPT | Mod: CPTII,S$GLB,, | Performed by: INTERNAL MEDICINE

## 2019-08-27 PROCEDURE — 3008F PR BODY MASS INDEX (BMI) DOCUMENTED: ICD-10-PCS | Mod: CPTII,S$GLB,, | Performed by: INTERNAL MEDICINE

## 2019-08-27 PROCEDURE — 99214 OFFICE O/P EST MOD 30 MIN: CPT | Mod: S$GLB,,, | Performed by: INTERNAL MEDICINE

## 2019-08-27 PROCEDURE — 99999 PR PBB SHADOW E&M-EST. PATIENT-LVL III: ICD-10-PCS | Mod: PBBFAC,,, | Performed by: INTERNAL MEDICINE

## 2019-08-27 PROCEDURE — 85025 COMPLETE CBC W/AUTO DIFF WBC: CPT

## 2019-08-27 PROCEDURE — 36415 COLL VENOUS BLD VENIPUNCTURE: CPT

## 2019-08-27 PROCEDURE — 80053 COMPREHEN METABOLIC PANEL: CPT

## 2019-08-27 ASSESSMENT — ROUTINE ASSESSMENT OF PATIENT INDEX DATA (RAPID3)
TOTAL RAPID3 SCORE: 6.44
PATIENT GLOBAL ASSESSMENT SCORE: 8
AM STIFFNESS SCORE: 1, YES
PAIN SCORE: 8
PSYCHOLOGICAL DISTRESS SCORE: 1
FATIGUE SCORE: 8
MDHAQ FUNCTION SCORE: 1

## 2019-08-27 NOTE — PROGRESS NOTES
Chief Complaint   Patient presents with    Disease Management     rheumatoid arthritis       Patient with rheumatoid arthritis for a follow up    History of presenting illness    58 year old black female comes in with a new diagnosis of rheumatoid arthritis    She has joint pains for 2 years    Feet hurt  Hands hurt  Neck hurts    Elbows,shoulders,wrists can hurt some  Back can hurt some    Pain,swelling,stiffness+  EMS+    Right 2nd finger is stiff and doesn't bend    No skin rashes,malar rash,photosensitivity  No telangiectasias  No calcinosis     No patchy alopecia  No oral and nasal ulcers  No sicca symptoms   No pleurisy or any cardiopulmonary complaints  No dysphagia,diplopia and dysphonia and muscle weakness  No n/v/d/c  No acid reflux+  No raynaud's+  No digital ulcers     No cytopenias  No renal issues  No blood clots     No fever,chills,night sweats,weight loss and loss of appetite     No pregnancy losses    A PCP diagnosed her with rheumatoid arthritis  Sent her to rheumatology on napolean : got mtx : 4 pills weekly/folic acid  She has headaches and she cant tolerate it   She had a rash    We did the whole panel    CBC nml except for low MCV  High glucose 293  ESR,CRP nml    CCP neg  Pre dmard panel neg  Uric acid nml  ULISES,SSA neg  HLA B27 neg    Arthritis survey    Mild deg changes in the neck  Deg changes in the hands and feet and knees  CXR nml    Humira offered and she has done well  Complaints today : fatigue,disturbed sleep,some aches and pains in the hands      Past history : dm,htn    Family history : mom has arthritis     Social history : former smoker,quit 20 years ago        Review of Systems   Constitutional: Negative for activity change, appetite change, chills, diaphoresis, fatigue, fever and unexpected weight change.   HENT: Negative for congestion, dental problem, drooling, ear discharge, ear pain, facial swelling, hearing loss, mouth sores, nosebleeds, postnasal drip, rhinorrhea,  sinus pressure, sinus pain, sneezing, sore throat, tinnitus, trouble swallowing and voice change.    Eyes: Negative for photophobia, pain, discharge, redness, itching and visual disturbance.   Respiratory: Negative for apnea, cough, choking, chest tightness, shortness of breath, wheezing and stridor.    Cardiovascular: Negative for chest pain, palpitations and leg swelling.   Gastrointestinal: Negative for abdominal distention, abdominal pain, anal bleeding, blood in stool, constipation, diarrhea, nausea, rectal pain and vomiting.   Endocrine: Negative for cold intolerance, heat intolerance, polydipsia, polyphagia and polyuria.   Genitourinary: Negative for decreased urine volume, difficulty urinating, dysuria, enuresis, flank pain, frequency, genital sores, hematuria and urgency.   Musculoskeletal: Positive for arthralgias. Negative for back pain, gait problem, joint swelling, myalgias, neck pain and neck stiffness.   Skin: Negative for color change, pallor, rash and wound.   Allergic/Immunologic: Negative for environmental allergies, food allergies and immunocompromised state.   Neurological: Negative for dizziness, tremors, seizures, syncope, facial asymmetry, speech difficulty, weakness, light-headedness, numbness and headaches.   Hematological: Negative for adenopathy. Does not bruise/bleed easily.   Psychiatric/Behavioral: Negative for agitation, behavioral problems, confusion, decreased concentration, dysphoric mood, hallucinations, self-injury, sleep disturbance and suicidal ideas. The patient is not nervous/anxious and is not hyperactive.      Physical Exam     Tenderness:   RUE: glenohumeral  Right hand: 2nd MCP  Left hand: 2nd MCP    RAMIREZ-28 tender joint count: 3  RAMIREZ-28 swollen joint count: 0    PAIN 8  FATIGUE 8  PTGL 8  EMS All day    Tender neck with limited ROM    Physical Exam   Constitutional: She is oriented to person, place, and time and well-developed, well-nourished, and in no distress. No  distress.   HENT:   Head: Normocephalic.   Mouth/Throat: Oropharynx is clear and moist.   Eyes: Conjunctivae are normal. Pupils are equal, round, and reactive to light. Right eye exhibits no discharge. Left eye exhibits no discharge. No scleral icterus.   Neck: Normal range of motion. No thyromegaly present.   Cardiovascular: Normal rate, regular rhythm, normal heart sounds and intact distal pulses.    Pulmonary/Chest: Effort normal and breath sounds normal. No stridor.   Abdominal: Soft. Bowel sounds are normal.   Lymphadenopathy:     She has no cervical adenopathy.   Neurological: She is alert and oriented to person, place, and time.   Skin: Skin is warm. No rash noted. She is not diaphoretic.     Psychiatric: Affect and judgment normal.   Musculoskeletal: Normal range of motion.         Assessment     58 year old black female with DM,HTN comes in with a new diagnosis of rheumatoid arthritis    She has joint pains for 2 years    Feet hurt  Hands hurt  Neck hurts    Elbows,shoulders,wrists can hurt some  Back can hurt some    Pain,swelling,stiffness+  EMS+    Right 2nd finger is stiff and doesn't bend    A PCP diagnosed her with rheumatoid arthritis  Sent her to rheumatology on napolean : got mtx : 4 pills weekly/folic acid  She has headaches and she cant tolerate it and has a rash with it     RF positive  CCP neg    She is on humira now and feels better     She has some pain and aches in the hands   She has pain in the arms and neck  She has pain all over her body    Exam today few tender joints no swollen joints     1. Rheumatoid arthritis involving multiple sites with positive rheumatoid factor            F/u problem     Plan    Continue humira same dose    She also takes gabapentin  meloxicam prn    Labs today    In 3 months if no complete resolution of symptoms we will d/c humira and continue enbrel      Marisol was seen today for disease management.    Diagnoses and all orders for this visit:    Rheumatoid  arthritis involving multiple sites with positive rheumatoid factor  -     CBC auto differential; Future  -     Comprehensive metabolic panel; Future  -     Sedimentation rate; Future  -     C-reactive protein; Future        rtc in 3 months

## 2019-09-03 ENCOUNTER — PROCEDURE VISIT (OUTPATIENT)
Dept: PODIATRY | Facility: CLINIC | Age: 59
End: 2019-09-03
Payer: MEDICARE

## 2019-09-03 VITALS
SYSTOLIC BLOOD PRESSURE: 156 MMHG | DIASTOLIC BLOOD PRESSURE: 83 MMHG | BODY MASS INDEX: 32.54 KG/M2 | WEIGHT: 195.56 LBS | HEART RATE: 84 BPM

## 2019-09-03 DIAGNOSIS — L60.0 INGROWN NAIL: ICD-10-CM

## 2019-09-03 DIAGNOSIS — S92.355A NONDISPLACED FRACTURE OF FIFTH METATARSAL BONE, LEFT FOOT, INITIAL ENCOUNTER FOR CLOSED FRACTURE: Primary | ICD-10-CM

## 2019-09-03 PROCEDURE — 11750 EXCISION NAIL&NAIL MATRIX: CPT | Mod: T1,S$GLB,, | Performed by: PODIATRIST

## 2019-09-03 PROCEDURE — 11750 PR REMOVAL OF NAIL BED: ICD-10-PCS | Mod: T1,S$GLB,, | Performed by: PODIATRIST

## 2019-09-08 NOTE — PROCEDURES
Permanent Nail Removal     Performed by:  Eugene Cadena   Authorized by:  Patient     Consent Done?:  Yes (Written)     Location:   left 2nd digit  Anesthesia:  Digital block  Local anesthetic: 0.5% Marcaine without epinephrine  Anesthetic total (ml):  4  Preparation:  Skin prepped with alcohol and skin prepped with Betadine     Amount removed:   partial removal with matrixectomy bilateral borders left 2nd digit  Wedge excision of skin of nail fold: No    Nail bed sutured?: No    Nail matrix removed:  Yes  Removed nail replaced and anchored: No    Dressing applied:  4x4, antibiotic ointment and dressing applied  Patient tolerance:  Patient tolerated the procedure well with no immediate complications    Digital nerve block applied to the above-mentioned toe. Toe was prepped and draped in a sterile fashion and penrose drain applied. Anesthesia was confirmed and the offending portion of nail plate was freed from the nail bed and eponychium, and was then excised. 89% phenol was applied to the nail matrix for 3 consecutive periods of 30 seconds and was then lavaged with copious amounts of 70% isopropyl alcohol. Penrose drain was removed and betadine ointment and dry sterile dressing applied. Post-op care instructions were discussed and dispensed to patient.

## 2019-09-10 ENCOUNTER — TELEPHONE (OUTPATIENT)
Dept: PHARMACY | Facility: CLINIC | Age: 59
End: 2019-09-10

## 2019-09-17 ENCOUNTER — OFFICE VISIT (OUTPATIENT)
Dept: PODIATRY | Facility: CLINIC | Age: 59
End: 2019-09-17
Payer: MEDICARE

## 2019-09-17 VITALS
HEART RATE: 76 BPM | BODY MASS INDEX: 32.54 KG/M2 | DIASTOLIC BLOOD PRESSURE: 78 MMHG | HEIGHT: 65 IN | SYSTOLIC BLOOD PRESSURE: 161 MMHG

## 2019-09-17 DIAGNOSIS — E11.49 OTHER DIABETIC NEUROLOGICAL COMPLICATION ASSOCIATED WITH TYPE 2 DIABETES MELLITUS: ICD-10-CM

## 2019-09-17 DIAGNOSIS — M06.9 RHEUMATOID ARTHRITIS, INVOLVING UNSPECIFIED SITE, UNSPECIFIED RHEUMATOID FACTOR PRESENCE: Primary | ICD-10-CM

## 2019-09-17 PROCEDURE — 99999 PR PBB SHADOW E&M-EST. PATIENT-LVL III: CPT | Mod: PBBFAC,,, | Performed by: PODIATRIST

## 2019-09-17 PROCEDURE — 99213 OFFICE O/P EST LOW 20 MIN: CPT | Mod: S$GLB,,, | Performed by: PODIATRIST

## 2019-09-17 PROCEDURE — 99213 PR OFFICE/OUTPT VISIT, EST, LEVL III, 20-29 MIN: ICD-10-PCS | Mod: S$GLB,,, | Performed by: PODIATRIST

## 2019-09-17 PROCEDURE — 3008F BODY MASS INDEX DOCD: CPT | Mod: CPTII,S$GLB,, | Performed by: PODIATRIST

## 2019-09-17 PROCEDURE — 3008F PR BODY MASS INDEX (BMI) DOCUMENTED: ICD-10-PCS | Mod: CPTII,S$GLB,, | Performed by: PODIATRIST

## 2019-09-17 PROCEDURE — 99999 PR PBB SHADOW E&M-EST. PATIENT-LVL III: ICD-10-PCS | Mod: PBBFAC,,, | Performed by: PODIATRIST

## 2019-09-25 ENCOUNTER — OFFICE VISIT (OUTPATIENT)
Dept: PLASTIC SURGERY | Facility: CLINIC | Age: 59
End: 2019-09-25
Payer: MEDICARE

## 2019-09-25 VITALS
HEART RATE: 78 BPM | SYSTOLIC BLOOD PRESSURE: 168 MMHG | WEIGHT: 191.25 LBS | DIASTOLIC BLOOD PRESSURE: 84 MMHG | BODY MASS INDEX: 31.83 KG/M2

## 2019-09-25 DIAGNOSIS — R21 EXCORIATED RASH: ICD-10-CM

## 2019-09-25 DIAGNOSIS — E65 PANNUS, ABDOMINAL: Primary | ICD-10-CM

## 2019-09-25 PROCEDURE — 99999 PR PBB SHADOW E&M-EST. PATIENT-LVL II: ICD-10-PCS | Mod: PBBFAC,,, | Performed by: SURGERY

## 2019-09-25 PROCEDURE — 3008F BODY MASS INDEX DOCD: CPT | Mod: CPTII,S$GLB,, | Performed by: SURGERY

## 2019-09-25 PROCEDURE — 99203 OFFICE O/P NEW LOW 30 MIN: CPT | Mod: S$GLB,,, | Performed by: SURGERY

## 2019-09-25 PROCEDURE — 99203 PR OFFICE/OUTPT VISIT, NEW, LEVL III, 30-44 MIN: ICD-10-PCS | Mod: S$GLB,,, | Performed by: SURGERY

## 2019-09-25 PROCEDURE — 99999 PR PBB SHADOW E&M-EST. PATIENT-LVL II: CPT | Mod: PBBFAC,,, | Performed by: SURGERY

## 2019-09-25 PROCEDURE — 3008F PR BODY MASS INDEX (BMI) DOCUMENTED: ICD-10-PCS | Mod: CPTII,S$GLB,, | Performed by: SURGERY

## 2019-09-25 NOTE — LETTER
[facility] [facility] [place]  [address], [address]  [facility] [address]  Phone: [phone]  Fax: [phone] September 26, 2019      SHANNAN Harry  [address] [facility] [place] [address]    Patient: Marisol Rosales   MR Number: [id number]   YOB: 1960   Date of Visit: 9/25/2019     Dear Caridad Selby:    Thank you for referring Marisol Rosales to me for evaluation. Attached you will find relevant portions of my assessment and plan of care.    Ms. Rosales is a 59-year-old female with a history of diabetes, on insulin, and rheumatoid arthritis, on humira, presents for a consult for a large abdominal pannus. She experiences chronic macerating rashes underneath the pannus that is treated with prescription creams and powders. She also experiences back pain for which she takes muscle relaxers, NSAIDs, and tylenol with some alleviation of symptoms. She has not participated in any physical therapy programs for her pain. She states that she has some urinary symptoms including leaking with laugh, cough, and sneeze.     We discussed options for panniculectomy vs abdominoplasty. The patient would like to pursue panniculectomy for symptom relief. At this point, I recommend the patient lose approximately 20-30 pounds for optimal results.  She is to follow up in the clinic in four months to reassess for panniculectomy.    If you have questions, please do not hesitate to call me. I look forward to following Marisol Rosales along with you.    Sincerely,    Arthur Gomez MD  Section of Plastic Surgery  Department of Surgery  [facility]    NAHUM/dany

## 2019-09-25 NOTE — PROGRESS NOTES
Subjective:      Patient ID: Marisol Rosales is a 59 y.o. female.    Chief Complaint: Follow-up; Nail Problem; and Ingrown Toenail (left 2nd toe)    Marisol is a 59 y.o. female who presents to the podiatry clinic  with complaint of  left foot pain.  She is doing well after left 2nd toenail matrixectomy procedure.    Review of Systems   Constitution: Negative for chills and fever.   HENT: Negative for congestion and tinnitus.    Eyes: Negative for double vision and visual disturbance.   Cardiovascular: Negative for chest pain and claudication.   Respiratory: Negative for hemoptysis and shortness of breath.    Endocrine: Negative for cold intolerance and heat intolerance.   Hematologic/Lymphatic: Negative for adenopathy and bleeding problem.   Skin: Negative for color change and nail changes.   Musculoskeletal: Positive for joint pain, joint swelling and stiffness. Negative for myalgias.   Gastrointestinal: Negative for nausea and vomiting.   Genitourinary: Negative for dysuria and hematuria.   Neurological: Negative for numbness and paresthesias.   Psychiatric/Behavioral: Negative for altered mental status and suicidal ideas.   Allergic/Immunologic: Negative for environmental allergies and persistent infections.           Objective:      Physical Exam   Constitutional: She is oriented to person, place, and time. She appears well-developed and well-nourished.   Cardiovascular:   Pulses:       Dorsalis pedis pulses are 2+ on the right side, and 2+ on the left side.        Posterior tibial pulses are 2+ on the right side, and 2+ on the left side.   Pulmonary/Chest: Effort normal.   Musculoskeletal: Normal range of motion.   Inspection and palpation of the muscles joints and bones of both lower extremities reveal that muscle strength for the anterior lateral and posterior muscle groups and intrinsic muscle groups of the foot are all 5 over 5 symmetrical.  Ankle subtalar midtarsal and digital joint range of motion are within  normal limits, nonpainful, without crepitus or effusion.   Tenderness to bilateral 2nd digits overlying the proximal interphalangeal joint and dorsal contracture noted of the metatarsophalangeal joints.   Neurological: She is alert and oriented to person, place, and time.   Sharp dull light touch vibratory proprioceptive sensation are intact bilaterally.  Deep tendon reflexes to patellar and Achilles tendon are symmetrical 2 over 4 bilaterally.  No ankle clonus or Babinski reflexes noted bilaterally.  Coordination is normal to both feet and lower extremities.   Skin: Skin is warm and dry. Capillary refill takes 2 to 3 seconds.   Skin turgor is normal bilaterally.  Skin texture is well hydrated to both lower extremities.  No lesions or rashes or wounds appreciated bilaterally.  Nail plates 1 through 5 bilaterally are within normal limits for length and thickness.  Left 2nd digit healing well no signs of infection.   Psychiatric: She has a normal mood and affect.   Vitals reviewed.            Assessment:       Encounter Diagnoses   Name Primary?    Rheumatoid arthritis, involving unspecified site, unspecified rheumatoid factor presence Yes    Other diabetic neurological complication associated with type 2 diabetes mellitus          Plan:       Marisol was seen today for follow-up, nail problem and ingrown toenail.    Diagnoses and all orders for this visit:    Rheumatoid arthritis, involving unspecified site, unspecified rheumatoid factor presence  -     DIABETIC SHOES FOR HOME USE    Other diabetic neurological complication associated with type 2 diabetes mellitus  -     DIABETIC SHOES FOR HOME USE      I counseled the patient on her conditions, their implications and medical management.      Resume normal activity and shoe gear.  Follow-up as needed.  .

## 2019-09-25 NOTE — PROGRESS NOTES
History & Physical  Plastic Surgery Clinic    SUBJECTIVE:     Chief complaint: Abdominal pannus    History of Present Illness:  Patient is a 59 y.o. female with a history of diabetes on insulin and rheumatoid arthritis on humira presents for a consult for a large abdominal pannus. She experiences chronic macerating rashes underneath the pannus that is treated with prescription creams and powders. She also experiences back pain for which she takes muscle relaxers, NSAIDs, and tylenol with some alleviation of symptoms. She has not participated in any physical therapy programs for her pain. She states that she has ome urinary symptoms including leaking with laugh, cough, and sneeze.     Past Medical History:  Past Medical History:   Diagnosis Date    Back pain     Diabetes mellitus     Hypertension        Past Surgical History:  Past Surgical History:   Procedure Laterality Date    HYSTERECTOMY         Family History:  No family history on file.    Social History:  Social History     Socioeconomic History    Marital status: Single     Spouse name: Not on file    Number of children: Not on file    Years of education: Not on file    Highest education level: Not on file   Occupational History    Not on file   Social Needs    Financial resource strain: Not on file    Food insecurity:     Worry: Not on file     Inability: Not on file    Transportation needs:     Medical: Not on file     Non-medical: Not on file   Tobacco Use    Smoking status: Former Smoker   Substance and Sexual Activity    Alcohol use: No    Drug use: No    Sexual activity: Not on file   Lifestyle    Physical activity:     Days per week: Not on file     Minutes per session: Not on file    Stress: Not on file   Relationships    Social connections:     Talks on phone: Not on file     Gets together: Not on file     Attends Sabianist service: Not on file     Active member of club or organization: Not on file     Attends meetings of clubs or  "organizations: Not on file     Relationship status: Not on file   Other Topics Concern    Not on file   Social History Narrative    Not on file       Medications:  Current Outpatient Medications   Medication Sig Dispense Refill    adalimumab (HUMIRA PEN) 40 mg/0.8 mL PnKt Inject 1 pen (40 mg total) into the skin every 14 (fourteen) days. 2 pen 11    albuterol (ACCUNEB) 1.25 mg/3 mL Nebu Take 1.25 mg by nebulization every 6 (six) hours as needed. Rescue      amLODIPine (NORVASC) 5 MG tablet Take 5 mg by mouth once daily.      aspirin (ECOTRIN) 81 MG EC tablet Take 81 mg by mouth once daily.      atorvastatin (LIPITOR) 20 MG tablet   1    BASAGLAR KWIKPEN U-100 INSULIN glargine 100 units/mL (3mL) SubQ pen   5    BD ULTRA-FINE SHORT PEN NEEDLE 31 gauge x 5/16" Ndle   11    cetirizine (ZYRTEC) 10 MG tablet Take 10 mg by mouth once daily.      diclofenac sodium (VOLTAREN) 1 % Gel Apply 2 g topically 4 (four) times daily. 1 Tube 2    enalapril (VASOTEC) 2.5 MG tablet Take 2.5 mg by mouth once daily.      gabapentin (NEURONTIN) 300 MG capsule Take 300 mg by mouth 3 (three) times daily.      meloxicam (MOBIC) 7.5 MG tablet Take 7.5 mg by mouth once daily.      metFORMIN (GLUMETZA) 1000 MG (MOD) 24 hr tablet Take 1,000 mg by mouth daily with breakfast.      methotrexate 2.5 MG Tab   0    NOVOLOG FLEXPEN U-100 INSULIN 100 unit/mL (3 mL) InPn pen   3    omeprazole (PRILOSEC) 40 MG capsule   5    predniSONE (DELTASONE) 10 MG tablet 40 mg daily for a week 30 mg daily for a week 20 mg daily for a week 10 mg daily for a week 100 tablet 0     No current facility-administered medications for this visit.        Allergies:  Review of patient's allergies indicates:   Allergen Reactions    Hydrochlorothiazide Shortness Of Breath    Codeine Other (See Comments)     "it makes me feel crazy"    Prednisone Palpitations     "it makes my heart beat fast. I can take the shot"       Review of Systems:  Negative except for " HPI.      OBJECTIVE:     BP (!) 168/84   Pulse 78   Wt 86.7 kg (191 lb 4 oz)   BMI 31.83 kg/m²     Physical Exam   Constitutional: She is oriented to person, place, and time and well-developed, well-nourished, and in no distress.   Cardiovascular: Normal rate and intact distal pulses.   Pulmonary/Chest: Effort normal. No respiratory distress.   Abdominal: Soft. She exhibits no distension. There is no tenderness.   Large abdominal pannus. Excess skin. Skin changes consistent with history of rashes. Inferior border of pannus moist.   Musculoskeletal: She exhibits no edema or tenderness.   Neurological: She is alert and oriented to person, place, and time.   Skin: Skin is warm and dry. No rash noted.   Nursing note and vitals reviewed.        ASSESSMENT/PLAN:     59 y.o. female with large abdominal pannus.    - Discussed options for panniculectomy vs abdominoplasty. Patient would like to pursue panniculectomy for symptom relief.  - At this point, I recommend the patient lose approximately 20-30 pounds for optimal results  - F/u clinic in 4 months to reassess for panniculectomy

## 2019-10-03 ENCOUNTER — TELEPHONE (OUTPATIENT)
Dept: PHARMACY | Facility: CLINIC | Age: 59
End: 2019-10-03

## 2019-10-07 ENCOUNTER — TELEPHONE (OUTPATIENT)
Dept: RHEUMATOLOGY | Facility: CLINIC | Age: 59
End: 2019-10-07

## 2019-10-07 NOTE — TELEPHONE ENCOUNTER
----- Message from Leena Chun sent at 10/7/2019  3:52 PM CDT -----  Contact: Self [phone]  PT stopped taking adalimumab (HUMIRA PEN) 40 mg/0.8 mL PnKt and would like to discuss it with the doctor. PT can be reached at [phone].

## 2019-10-09 ENCOUNTER — TELEPHONE (OUTPATIENT)
Dept: DERMATOLOGY | Facility: CLINIC | Age: 59
End: 2019-10-09

## 2019-10-09 ENCOUNTER — OFFICE VISIT (OUTPATIENT)
Dept: DERMATOLOGY | Facility: CLINIC | Age: 59
End: 2019-10-09
Payer: MEDICARE

## 2019-10-09 DIAGNOSIS — L23.9 ALLERGIC CONTACT DERMATITIS, UNSPECIFIED TRIGGER: ICD-10-CM

## 2019-10-09 DIAGNOSIS — L30.1 DYSHIDROTIC ECZEMA: Primary | ICD-10-CM

## 2019-10-09 PROCEDURE — 99999 PR PBB SHADOW E&M-EST. PATIENT-LVL III: ICD-10-PCS | Mod: PBBFAC,,,

## 2019-10-09 PROCEDURE — 99213 OFFICE O/P EST LOW 20 MIN: CPT | Mod: S$GLB,,, | Performed by: DERMATOLOGY

## 2019-10-09 PROCEDURE — 99999 PR PBB SHADOW E&M-EST. PATIENT-LVL III: CPT | Mod: PBBFAC,,,

## 2019-10-09 PROCEDURE — 99213 PR OFFICE/OUTPT VISIT, EST, LEVL III, 20-29 MIN: ICD-10-PCS | Mod: S$GLB,,, | Performed by: DERMATOLOGY

## 2019-10-09 RX ORDER — NEBULIZER AND COMPRESSOR
EACH MISCELLANEOUS
COMMUNITY
Start: 2016-09-01

## 2019-10-09 RX ORDER — CAPSAICIN 0.03 G/100G
CREAM TOPICAL
COMMUNITY
Start: 2017-07-25 | End: 2020-10-20

## 2019-10-09 RX ORDER — TRIAMCINOLONE ACETONIDE 1 MG/G
CREAM TOPICAL
COMMUNITY

## 2019-10-09 RX ORDER — INSULIN LISPRO 100 [IU]/ML
INJECTION, SOLUTION INTRAVENOUS; SUBCUTANEOUS
COMMUNITY
End: 2020-10-20 | Stop reason: SDUPTHER

## 2019-10-09 RX ORDER — CYCLOBENZAPRINE HCL 10 MG
TABLET ORAL
Refills: 5 | COMMUNITY
Start: 2019-08-27 | End: 2020-10-20 | Stop reason: SDUPTHER

## 2019-10-09 RX ORDER — FLUTICASONE PROPIONATE 50 MCG
SPRAY, SUSPENSION (ML) NASAL
Refills: 1 | COMMUNITY
Start: 2019-08-27 | End: 2020-10-20 | Stop reason: SDUPTHER

## 2019-10-09 RX ORDER — CLOBETASOL PROPIONATE 0.5 MG/G
OINTMENT TOPICAL
Qty: 60 G | Refills: 3 | Status: SHIPPED | OUTPATIENT
Start: 2019-10-09 | End: 2022-03-17 | Stop reason: SDUPTHER

## 2019-10-09 NOTE — PROGRESS NOTES
Subjective:       Patient ID:  Marisol Rosales is a 59 y.o. female who presents for   Chief Complaint   Patient presents with    Lesion     L 3rd digit     58yo F presents complaining of 9 mo h/o dry, itching, red spot on L 3rd digit. It started when she spilt hot grits on the finger that caused a burn. She started putting Neosporin on it which caused it to get worse. She uses Dial soap, and is not using any moisturizers. She does not wear any jewelry on the finger. She denies any other areas of involvement.    Lesion  - Initial  Affected locations: left fingers  Duration: 9 months  Signs / symptoms: burning and cracking  Treatments tried: neosporin - worsen, TAC cream - helped.        Review of Systems   Constitutional: Negative for fever, chills, fatigue and malaise.   Skin: Positive for rash and dry skin. Negative for itching.   Hematologic/Lymphatic: Does not bruise/bleed easily.        Objective:    Physical Exam   Constitutional: She appears well-developed and well-nourished. No distress.   Neurological: She is alert and oriented to person, place, and time. She is not disoriented.   Psychiatric: She has a normal mood and affect.   Skin:   Areas Examined (abnormalities noted in diagram):   Head / Face Inspection Performed  RUE Inspected  LUE Inspection Performed  Nails and Digits Inspection Performed             Diagram Legend     Erythematous scaling macule/papule c/w actinic keratosis       Vascular papule c/w angioma      Pigmented verrucoid papule/plaque c/w seborrheic keratosis      Yellow umbilicated papule c/w sebaceous hyperplasia      Irregularly shaped tan macule c/w lentigo     1-2 mm smooth white papules consistent with Milia      Movable subcutaneous cyst with punctum c/w epidermal inclusion cyst      Subcutaneous movable cyst c/w pilar cyst      Firm pink to brown papule c/w dermatofibroma      Pedunculated fleshy papule(s) c/w skin tag(s)      Evenly pigmented macule c/w junctional nevus     Mildly  variegated pigmented, slightly irregular-bordered macule c/w mildly atypical nevus      Flesh colored to evenly pigmented papule c/w intradermal nevus       Pink pearly papule/plaque c/w basal cell carcinoma      Erythematous hyperkeratotic cursted plaque c/w SCC      Surgical scar with no sign of skin cancer recurrence      Open and closed comedones      Inflammatory papules and pustules      Verrucoid papule consistent consistent with wart     Erythematous eczematous patches and plaques     Dystrophic onycholytic nail with subungual debris c/w onychomycosis     Umbilicated papule    Erythematous-base heme-crusted tan verrucoid plaque consistent with inflamed seborrheic keratosis     Erythematous Silvery Scaling Plaque c/w Psoriasis     See annotation      Assessment / Plan:        Dyshidrotic eczema  On L 3rd digit x9 months. Secondary to frequent hand washing with Dial soap and use of hand . After washing hands, pat dry, and apply bland emollient multiple times throughout day. Discussed avoidance of other chemicals, fragrances, and contactants.    Allergic contact dermatitis, unspecified trigger  On L 3rd digit, secondary to Neosporin. Discussed ACD nature of Neosporin. Pt to stop.  - Start Clobetasol ointment to affected area BID, when at night, wear under white cotton glove.    Other orders  -     clobetasol 0.05% (TEMOVATE) 0.05 % Oint; Apply to affected area BID  Dispense: 60 g; Refill: 3             No follow-ups on file.

## 2019-10-09 NOTE — TELEPHONE ENCOUNTER
----- Message from Kyra Solomon MA sent at 10/8/2019  5:06 PM CDT -----  Contact: Patient   This was sent thru [facility] scheduling  ----- Message -----  From: Melyssa Rosales  Sent: 10/8/2019   3:18 PM CDT  To: [facility] D[facility] Clinical Staff    Patient wanted to make an appointment for Left hand dry cracking skin// [place]  Patient also has diabetes and would like to be seen tomorrow or sometime this month. I attempted to schedule but the soonest appointment was in 11/07    [phone]

## 2019-10-16 ENCOUNTER — DOCUMENTATION ONLY (OUTPATIENT)
Dept: RHEUMATOLOGY | Facility: CLINIC | Age: 59
End: 2019-10-16

## 2019-12-11 ENCOUNTER — TELEPHONE (OUTPATIENT)
Dept: PODIATRY | Facility: CLINIC | Age: 59
End: 2019-12-11

## 2019-12-11 NOTE — TELEPHONE ENCOUNTER
----- Message from Marianna Lozano sent at 12/11/2019  8:21 AM CST -----  Contact: PT  PT called Requesting a later appointment in the afternoon on same day 12/17    Callback: [phone]

## 2019-12-11 NOTE — TELEPHONE ENCOUNTER
Called mariusz back to let her know that dr. squires is totally booked out till mid January. Mariusz understood and she said she want to keep her appointment in the morning

## 2019-12-17 ENCOUNTER — OFFICE VISIT (OUTPATIENT)
Dept: PODIATRY | Facility: CLINIC | Age: 59
End: 2019-12-17
Payer: MEDICARE

## 2019-12-17 VITALS
WEIGHT: 191 LBS | SYSTOLIC BLOOD PRESSURE: 160 MMHG | HEIGHT: 65 IN | HEART RATE: 78 BPM | DIASTOLIC BLOOD PRESSURE: 80 MMHG | BODY MASS INDEX: 31.82 KG/M2

## 2019-12-17 DIAGNOSIS — M20.11 VALGUS DEFORMITY OF BOTH GREAT TOES: ICD-10-CM

## 2019-12-17 DIAGNOSIS — M20.42 HAMMER TOES OF BOTH FEET: Primary | ICD-10-CM

## 2019-12-17 DIAGNOSIS — M20.41 HAMMER TOES OF BOTH FEET: Primary | ICD-10-CM

## 2019-12-17 DIAGNOSIS — M20.12 VALGUS DEFORMITY OF BOTH GREAT TOES: ICD-10-CM

## 2019-12-17 PROCEDURE — 99214 OFFICE O/P EST MOD 30 MIN: CPT | Mod: S$GLB,,, | Performed by: PODIATRIST

## 2019-12-17 PROCEDURE — 99999 PR PBB SHADOW E&M-EST. PATIENT-LVL III: CPT | Mod: PBBFAC,,, | Performed by: PODIATRIST

## 2019-12-17 PROCEDURE — 99214 PR OFFICE/OUTPT VISIT, EST, LEVL IV, 30-39 MIN: ICD-10-PCS | Mod: S$GLB,,, | Performed by: PODIATRIST

## 2019-12-17 PROCEDURE — 99999 PR PBB SHADOW E&M-EST. PATIENT-LVL III: ICD-10-PCS | Mod: PBBFAC,,, | Performed by: PODIATRIST

## 2019-12-17 PROCEDURE — 3008F PR BODY MASS INDEX (BMI) DOCUMENTED: ICD-10-PCS | Mod: CPTII,S$GLB,, | Performed by: PODIATRIST

## 2019-12-17 PROCEDURE — 3008F BODY MASS INDEX DOCD: CPT | Mod: CPTII,S$GLB,, | Performed by: PODIATRIST

## 2019-12-17 RX ORDER — UREA 450 MG/G
1 CREAM TOPICAL 2 TIMES DAILY
Qty: 255 G | Refills: 11 | Status: SHIPPED | OUTPATIENT
Start: 2019-12-17 | End: 2020-10-20

## 2019-12-17 NOTE — PROGRESS NOTES
Subjective:      Patient ID: Marisol Rosales is a 59 y.o. female.    Chief Complaint: Post-op Evaluation (left)    Marisol is a 59 y.o. female who presents to the podiatry clinic  with complaint of  left foot pain.  She is doing well after left 2nd toenail matrixectomy procedure.    Review of Systems   Constitution: Negative for chills and fever.   HENT: Negative for congestion and tinnitus.    Eyes: Negative for double vision and visual disturbance.   Cardiovascular: Negative for chest pain and claudication.   Respiratory: Negative for hemoptysis and shortness of breath.    Endocrine: Negative for cold intolerance and heat intolerance.   Hematologic/Lymphatic: Negative for adenopathy and bleeding problem.   Skin: Negative for color change and nail changes.   Musculoskeletal: Positive for joint pain, joint swelling and stiffness. Negative for myalgias.   Gastrointestinal: Negative for nausea and vomiting.   Genitourinary: Negative for dysuria and hematuria.   Neurological: Negative for numbness and paresthesias.   Psychiatric/Behavioral: Negative for altered mental status and suicidal ideas.   Allergic/Immunologic: Negative for environmental allergies and persistent infections.           Objective:      Physical Exam   Constitutional: She is oriented to person, place, and time. She appears well-developed and well-nourished.   Cardiovascular:   Pulses:       Dorsalis pedis pulses are 2+ on the right side, and 2+ on the left side.        Posterior tibial pulses are 2+ on the right side, and 2+ on the left side.   Pulmonary/Chest: Effort normal.   Musculoskeletal: Normal range of motion.   Inspection and palpation of the muscles joints and bones of both lower extremities reveal that muscle strength for the anterior lateral and posterior muscle groups and intrinsic muscle groups of the foot are all 5 over 5 symmetrical.  Ankle subtalar midtarsal and digital joint range of motion are within normal limits, nonpainful, without  crepitus or effusion.   Tenderness to bilateral 2nd digits overlying the proximal interphalangeal joint and dorsal contracture noted of the metatarsophalangeal joints.   Neurological: She is alert and oriented to person, place, and time.   Sharp dull light touch vibratory proprioceptive sensation are intact bilaterally.  Deep tendon reflexes to patellar and Achilles tendon are symmetrical 2 over 4 bilaterally.  No ankle clonus or Babinski reflexes noted bilaterally.  Coordination is normal to both feet and lower extremities.   Skin: Skin is warm and dry. Capillary refill takes 2 to 3 seconds.   Skin turgor is normal bilaterally.  Skin texture is well hydrated to both lower extremities.  No lesions or rashes or wounds appreciated bilaterally.  Nail plates 1 through 5 bilaterally are  within normal limits for length and thickness.  Left 2nd digit well healed.   Psychiatric: She has a normal mood and affect.   Vitals reviewed.            Assessment:       Encounter Diagnoses   Name Primary?    Hammer toes of both feet Yes    Valgus deformity of both great toes          Plan:       Marisol was seen today for post-op evaluation.    Diagnoses and all orders for this visit:    Hammer toes of both feet    Valgus deformity of both great toes    Other orders  -     urea 45 % Crea; Apply 1 application topically 2 (two) times daily.      I counseled the patient on her conditions, their implications and medical management.   Conservative surgical options discussed regarding bilateral hammertoe and bunion procedures.    Resume normal activity and shoe gear.  Follow-up as needed.  .

## 2020-01-07 ENCOUNTER — CLINICAL SUPPORT (OUTPATIENT)
Dept: INFECTIOUS DISEASES | Facility: CLINIC | Age: 60
End: 2020-01-07
Payer: MEDICARE

## 2020-01-07 PROCEDURE — 90471 HEPATITIS A HEPATITIS B COMBINED VACCINE IM: ICD-10-PCS | Mod: S$GLB,,, | Performed by: INTERNAL MEDICINE

## 2020-01-07 PROCEDURE — 90636 HEPATITIS A HEPATITIS B COMBINED VACCINE IM: ICD-10-PCS | Mod: S$GLB,,, | Performed by: INTERNAL MEDICINE

## 2020-01-07 PROCEDURE — 90471 IMMUNIZATION ADMIN: CPT | Mod: S$GLB,,, | Performed by: INTERNAL MEDICINE

## 2020-01-07 PROCEDURE — 90636 HEP A/HEP B VACC ADULT IM: CPT | Mod: S$GLB,,, | Performed by: INTERNAL MEDICINE

## 2020-01-28 ENCOUNTER — OFFICE VISIT (OUTPATIENT)
Dept: RHEUMATOLOGY | Facility: CLINIC | Age: 60
End: 2020-01-28
Payer: MEDICARE

## 2020-01-28 ENCOUNTER — DOCUMENTATION ONLY (OUTPATIENT)
Dept: PODIATRY | Facility: CLINIC | Age: 60
End: 2020-01-28

## 2020-01-28 ENCOUNTER — LAB VISIT (OUTPATIENT)
Dept: LAB | Facility: HOSPITAL | Age: 60
End: 2020-01-28
Attending: INTERNAL MEDICINE
Payer: MEDICARE

## 2020-01-28 VITALS
WEIGHT: 190.94 LBS | HEIGHT: 66 IN | BODY MASS INDEX: 30.69 KG/M2 | DIASTOLIC BLOOD PRESSURE: 81 MMHG | HEART RATE: 89 BPM | SYSTOLIC BLOOD PRESSURE: 134 MMHG

## 2020-01-28 DIAGNOSIS — M05.79 RHEUMATOID ARTHRITIS INVOLVING MULTIPLE SITES WITH POSITIVE RHEUMATOID FACTOR: Primary | ICD-10-CM

## 2020-01-28 DIAGNOSIS — M20.41 HAMMER TOES OF BOTH FEET: ICD-10-CM

## 2020-01-28 DIAGNOSIS — M05.79 RHEUMATOID ARTHRITIS INVOLVING MULTIPLE SITES WITH POSITIVE RHEUMATOID FACTOR: ICD-10-CM

## 2020-01-28 DIAGNOSIS — M20.42 HAMMER TOES OF BOTH FEET: ICD-10-CM

## 2020-01-28 LAB
ALBUMIN SERPL BCP-MCNC: 3.8 G/DL (ref 3.5–5.2)
ALP SERPL-CCNC: 136 U/L (ref 55–135)
ALT SERPL W/O P-5'-P-CCNC: 17 U/L (ref 10–44)
ANION GAP SERPL CALC-SCNC: 10 MMOL/L (ref 8–16)
AST SERPL-CCNC: 13 U/L (ref 10–40)
BASOPHILS # BLD AUTO: 0.03 K/UL (ref 0–0.2)
BASOPHILS NFR BLD: 0.5 % (ref 0–1.9)
BILIRUB SERPL-MCNC: 0.4 MG/DL (ref 0.1–1)
BUN SERPL-MCNC: 12 MG/DL (ref 6–20)
CALCIUM SERPL-MCNC: 9.3 MG/DL (ref 8.7–10.5)
CHLORIDE SERPL-SCNC: 109 MMOL/L (ref 95–110)
CO2 SERPL-SCNC: 24 MMOL/L (ref 23–29)
CREAT SERPL-MCNC: 0.7 MG/DL (ref 0.5–1.4)
CRP SERPL-MCNC: 1.6 MG/L (ref 0–8.2)
DIFFERENTIAL METHOD: ABNORMAL
EOSINOPHIL # BLD AUTO: 0.2 K/UL (ref 0–0.5)
EOSINOPHIL NFR BLD: 2.7 % (ref 0–8)
ERYTHROCYTE [DISTWIDTH] IN BLOOD BY AUTOMATED COUNT: 15.4 % (ref 11.5–14.5)
ERYTHROCYTE [SEDIMENTATION RATE] IN BLOOD BY WESTERGREN METHOD: 11 MM/HR (ref 0–36)
EST. GFR  (AFRICAN AMERICAN): >60 ML/MIN/1.73 M^2
EST. GFR  (NON AFRICAN AMERICAN): >60 ML/MIN/1.73 M^2
GLUCOSE SERPL-MCNC: 215 MG/DL (ref 70–110)
HCT VFR BLD AUTO: 41.4 % (ref 37–48.5)
HGB BLD-MCNC: 12.4 G/DL (ref 12–16)
IMM GRANULOCYTES # BLD AUTO: 0.01 K/UL (ref 0–0.04)
IMM GRANULOCYTES NFR BLD AUTO: 0.2 % (ref 0–0.5)
LYMPHOCYTES # BLD AUTO: 2 K/UL (ref 1–4.8)
LYMPHOCYTES NFR BLD: 33.7 % (ref 18–48)
MCH RBC QN AUTO: 23.9 PG (ref 27–31)
MCHC RBC AUTO-ENTMCNC: 30 G/DL (ref 32–36)
MCV RBC AUTO: 80 FL (ref 82–98)
MONOCYTES # BLD AUTO: 0.4 K/UL (ref 0.3–1)
MONOCYTES NFR BLD: 7.1 % (ref 4–15)
NEUTROPHILS # BLD AUTO: 3.3 K/UL (ref 1.8–7.7)
NEUTROPHILS NFR BLD: 55.8 % (ref 38–73)
NRBC BLD-RTO: 0 /100 WBC
PLATELET # BLD AUTO: 318 K/UL (ref 150–350)
PMV BLD AUTO: 9.2 FL (ref 9.2–12.9)
POTASSIUM SERPL-SCNC: 3.8 MMOL/L (ref 3.5–5.1)
PROT SERPL-MCNC: 7.7 G/DL (ref 6–8.4)
RBC # BLD AUTO: 5.18 M/UL (ref 4–5.4)
SODIUM SERPL-SCNC: 143 MMOL/L (ref 136–145)
WBC # BLD AUTO: 5.9 K/UL (ref 3.9–12.7)

## 2020-01-28 PROCEDURE — 99214 PR OFFICE/OUTPT VISIT, EST, LEVL IV, 30-39 MIN: ICD-10-PCS | Mod: S$GLB,,, | Performed by: INTERNAL MEDICINE

## 2020-01-28 PROCEDURE — 36415 COLL VENOUS BLD VENIPUNCTURE: CPT

## 2020-01-28 PROCEDURE — 86140 C-REACTIVE PROTEIN: CPT

## 2020-01-28 PROCEDURE — 99999 PR PBB SHADOW E&M-EST. PATIENT-LVL III: CPT | Mod: PBBFAC,,, | Performed by: INTERNAL MEDICINE

## 2020-01-28 PROCEDURE — 80053 COMPREHEN METABOLIC PANEL: CPT

## 2020-01-28 PROCEDURE — 85025 COMPLETE CBC W/AUTO DIFF WBC: CPT

## 2020-01-28 PROCEDURE — 99999 PR PBB SHADOW E&M-EST. PATIENT-LVL III: ICD-10-PCS | Mod: PBBFAC,,, | Performed by: INTERNAL MEDICINE

## 2020-01-28 PROCEDURE — 99214 OFFICE O/P EST MOD 30 MIN: CPT | Mod: S$GLB,,, | Performed by: INTERNAL MEDICINE

## 2020-01-28 PROCEDURE — 85652 RBC SED RATE AUTOMATED: CPT

## 2020-01-28 PROCEDURE — 3008F BODY MASS INDEX DOCD: CPT | Mod: CPTII,S$GLB,, | Performed by: INTERNAL MEDICINE

## 2020-01-28 PROCEDURE — 3008F PR BODY MASS INDEX (BMI) DOCUMENTED: ICD-10-PCS | Mod: CPTII,S$GLB,, | Performed by: INTERNAL MEDICINE

## 2020-01-28 RX ORDER — BETAMETHASONE DIPROPIONATE 0.5 MG/G
CREAM TOPICAL
COMMUNITY
Start: 2020-01-20

## 2020-01-28 RX ORDER — DICLOFENAC SODIUM 10 MG/G
2 GEL TOPICAL 4 TIMES DAILY
Qty: 1 TUBE | Refills: 2 | Status: SHIPPED | OUTPATIENT
Start: 2020-01-28 | End: 2021-07-30 | Stop reason: SDUPTHER

## 2020-01-28 RX ORDER — SULFASALAZINE 500 MG/1
TABLET ORAL
Qty: 180 TABLET | Refills: 4 | Status: SHIPPED | OUTPATIENT
Start: 2020-01-28 | End: 2020-10-20

## 2020-01-28 RX ORDER — HYDROCHLOROTHIAZIDE 25 MG/1
TABLET ORAL
Refills: 5 | COMMUNITY
Start: 2019-11-20 | End: 2020-10-20 | Stop reason: SDUPTHER

## 2020-01-28 RX ORDER — GABAPENTIN 300 MG/1
300 CAPSULE ORAL 3 TIMES DAILY
Qty: 90 CAPSULE | Refills: 4 | Status: SHIPPED | OUTPATIENT
Start: 2020-01-28 | End: 2020-02-27

## 2020-01-28 RX ORDER — HYDROXYCHLOROQUINE SULFATE 200 MG/1
200 TABLET, FILM COATED ORAL 2 TIMES DAILY
Qty: 60 TABLET | Refills: 5 | Status: SHIPPED | OUTPATIENT
Start: 2020-01-28 | End: 2020-02-27

## 2020-01-28 NOTE — PROGRESS NOTES
Pt arrived at  requesting to see Dr. Cadena. Nurse informed pt that Dr. Cadena is in clinic but I would give him a message. Pt declines. States she is in pain and would like a quick visit with him. Pt informed that [facility] is in clinic but we have an on call provider that I can possibly put her with pt declines. Pt declines several locations and several doctors. Pt also informed she can go to the emergency room or urgent care. Pt also declines all offers. States she will go to another location.

## 2020-01-28 NOTE — PROGRESS NOTES
Chief Complaint   Patient presents with    Disease Management     rheumatoid arthritis       Patient with rheumatoid arthritis for a follow up    History of presenting illness    58 year old black female comes in with a new diagnosis of rheumatoid arthritis    She has joint pains for 2 years    Feet hurt  Hands hurt  Neck hurts    Elbows,shoulders,wrists can hurt some  Back can hurt some    Pain,swelling,stiffness+  EMS+    Right 2nd finger is stiff and doesn't bend    No skin rashes,malar rash,photosensitivity  No telangiectasias  No calcinosis     No patchy alopecia  No oral and nasal ulcers  No sicca symptoms   No pleurisy or any cardiopulmonary complaints  No dysphagia,diplopia and dysphonia and muscle weakness  No n/v/d/c  No acid reflux+  No raynaud's+  No digital ulcers     No cytopenias  No renal issues  No blood clots     No fever,chills,night sweats,weight loss and loss of appetite     No pregnancy losses    A PCP diagnosed her with rheumatoid arthritis  Sent her to rheumatology on napolean : got mtx : 4 pills weekly/folic acid  She has headaches and she cant tolerate it   She had a rash    We did the whole panel    CBC nml except for low MCV  High glucose 293  ESR,CRP nml    CCP neg  Pre dmard panel neg  Uric acid nml  ULISES,SSA neg  HLA B27 neg    Arthritis survey    Mild deg changes in the neck  Deg changes in the hands and feet and knees  CXR nml    Humira offered   She stopped it  She started taking turmeric    She has ongoing pain and swelling in the hands and feet  Right shoulder hurts    Past history : dm,htn    Family history : mom has arthritis     Social history : former smoker,quit 20 years ago        Review of Systems   Constitutional: Negative for activity change, appetite change, chills, diaphoresis, fatigue, fever and unexpected weight change.   HENT: Negative for congestion, dental problem, drooling, ear discharge, ear pain, facial swelling, hearing loss, mouth sores, nosebleeds,  postnasal drip, rhinorrhea, sinus pressure, sinus pain, sneezing, sore throat, tinnitus, trouble swallowing and voice change.    Eyes: Negative for photophobia, pain, discharge, redness, itching and visual disturbance.   Respiratory: Negative for apnea, cough, choking, chest tightness, shortness of breath, wheezing and stridor.    Cardiovascular: Negative for chest pain, palpitations and leg swelling.   Gastrointestinal: Negative for abdominal distention, abdominal pain, anal bleeding, blood in stool, constipation, diarrhea, nausea, rectal pain and vomiting.   Endocrine: Negative for cold intolerance, heat intolerance, polydipsia, polyphagia and polyuria.   Genitourinary: Negative for decreased urine volume, difficulty urinating, dysuria, enuresis, flank pain, frequency, genital sores, hematuria and urgency.   Musculoskeletal: Positive for arthralgias. Negative for back pain, gait problem, joint swelling, myalgias, neck pain and neck stiffness.   Skin: Negative for color change, pallor, rash and wound.   Allergic/Immunologic: Negative for environmental allergies, food allergies and immunocompromised state.   Neurological: Negative for dizziness, tremors, seizures, syncope, facial asymmetry, speech difficulty, weakness, light-headedness, numbness and headaches.   Hematological: Negative for adenopathy. Does not bruise/bleed easily.   Psychiatric/Behavioral: Negative for agitation, behavioral problems, confusion, decreased concentration, dysphoric mood, hallucinations, self-injury, sleep disturbance and suicidal ideas. The patient is not nervous/anxious and is not hyperactive.      Physical Exam     Tenderness:   RUE: glenohumeral  Right hand: 2nd MCP  Left hand: 2nd MCP and 3rd MCP  Left foot: 2nd MTP and 3rd MTP    Swelling:   Right hand: 2nd MCP  Left hand: 2nd MCP and 3rd MCP  Left foot: 2nd MTP and 3rd MTP    RAMIREZ-28 tender joint count: 4  RAMIREZ-28 swollen joint count: 3    PAIN 8  FATIGUE 8  PTGL 8  EMS All  day      Physical Exam   Constitutional: She is oriented to person, place, and time and well-developed, well-nourished, and in no distress. No distress.   HENT:   Head: Normocephalic.   Mouth/Throat: Oropharynx is clear and moist.   Eyes: Conjunctivae are normal. Pupils are equal, round, and reactive to light. Right eye exhibits no discharge. Left eye exhibits no discharge. No scleral icterus.   Neck: Normal range of motion. No thyromegaly present.   Cardiovascular: Normal rate, regular rhythm, normal heart sounds and intact distal pulses.    Pulmonary/Chest: Effort normal and breath sounds normal. No stridor.   Abdominal: Soft. Bowel sounds are normal.   Lymphadenopathy:     She has no cervical adenopathy.   Neurological: She is alert and oriented to person, place, and time.   Skin: Skin is warm. No rash noted. She is not diaphoretic.     Psychiatric: Affect and judgment normal.   Musculoskeletal: Normal range of motion.         Assessment     59 year old black female with DM,HTN comes in with a new diagnosis of rheumatoid arthritis    She has joint pains for 2 years    Feet hurt  Hands hurt  Neck hurts    Elbows,shoulders,wrists can hurt some  Back can hurt some    Pain,swelling,stiffness+  EMS+    Right 2nd finger is stiff and doesn't bend    A PCP diagnosed her with rheumatoid arthritis  Sent her to rheumatology on nap[place] : got mtx : 4 pills weekly/folic acid  She has headaches and she cant tolerate it and has a rash with it     RF positive  CCP neg    She was on humira and she had some headaches   She saw ad on the tv and got scared of the side effects  She doesn't want to do biologics now    She does have moderate disease activity today    1. Rheumatoid arthritis involving multiple sites with positive rheumatoid factor    2. Hammer toes of both feet            F/u problem     Plan    Will offer ssz 500 mg bid and then titrate to 1000 mg bid and then titrate to 1500 mg bid  Clinical side effects of  sulfasalazine  Headache,skin rash,alopecia,nausea,GI distress,diarrhea,vomiting,dizziness,abdominal pain,rash,pancytopenia,liver function derangements,fever,arthralgia,ataxia,depression,lupus-like syndrome, nephrolithiasis, nephrotic syndrome,periorbital edema, peripheral neuropathy, pleurisy, pneumonia, rhabdomyolysis, seizure, sepsis, serum sickness-like reaction   No h/o aspirin,salicylate allergies  No porphyrias  No intestinal and urinary obstruction    Start plaquenil 200 mg bid  Clinical manifestations of plaquenil toxicity  Ataxia,dizziness,emotional issues,nervousness,seizures,suicidal tendencies,vertigo  Alopecia,rash,photosensitivity,SJS,dermatitis  Weight loss  Anemia,agranulocytosis,pancytopenia  Liver failure  Hypersensitivity/Angioedema  Myopathy   Changes in accomodation  Tinnitus  Bronchospasm  Cardiomyopathy   Hypoglycemia  Retinal toxicity    She also takes gabapentin  meloxicam prn    voltaren gel topical    Labs today      Marisol was seen today for disease management.    Diagnoses and all orders for this visit:    Rheumatoid arthritis involving multiple sites with positive rheumatoid factor  -     CBC auto differential; Future  -     Comprehensive metabolic panel; Future  -     Sedimentation rate; Future  -     C-reactive protein; Future    Hammer toes of both feet  -     diclofenac sodium (VOLTAREN) 1 % Gel; Apply 2 g topically 4 (four) times daily.    Other orders  -     hydroxychloroquine (PLAQUENIL) 200 mg tablet; Take 1 tablet (200 mg total) by mouth 2 (two) times daily.  -     sulfaSALAzine (AZULFIDINE) 500 mg Tab; 500 mg bid for a week and then 1000 mg bid for a week and then 1500 mg bid  -     gabapentin (NEURONTIN) 300 MG capsule; Take 1 capsule (300 mg total) by mouth 3 (three) times daily.        rtc in 3 months

## 2020-02-03 ENCOUNTER — DOCUMENTATION ONLY (OUTPATIENT)
Dept: RHEUMATOLOGY | Facility: CLINIC | Age: 60
End: 2020-02-03

## 2020-02-28 ENCOUNTER — DOCUMENTATION ONLY (OUTPATIENT)
Dept: DERMATOLOGY | Facility: CLINIC | Age: 60
End: 2020-02-28

## 2020-03-11 ENCOUNTER — TELEPHONE (OUTPATIENT)
Dept: RHEUMATOLOGY | Facility: CLINIC | Age: 60
End: 2020-03-11

## 2020-03-11 RX ORDER — NAPROXEN 500 MG/1
500 TABLET ORAL 2 TIMES DAILY WITH MEALS
Qty: 60 TABLET | Refills: 2 | Status: SHIPPED | OUTPATIENT
Start: 2020-03-11 | End: 2020-05-10

## 2020-03-11 NOTE — TELEPHONE ENCOUNTER
Spoke with the pt and let her know that Dr. Esparza has her message and will reply to it as soon as she can

## 2020-03-11 NOTE — TELEPHONE ENCOUNTER
----- Message from Veronica Rebollar sent at 3/11/2020  9:49 AM CDT -----  Contact: self  Pt states this medication is giving her a headache she stopped taking it a week ago sulfaSALAzine (AZULFIDINE) 500 mg Tab.. Would like to know if you can call her in some naproxen... At [facility] on [address]    Asking for a call back.    Contact info [phone]

## 2020-05-01 ENCOUNTER — TELEPHONE (OUTPATIENT)
Dept: RHEUMATOLOGY | Facility: CLINIC | Age: 60
End: 2020-05-01

## 2020-05-01 NOTE — TELEPHONE ENCOUNTER
Called all the number on the patient's list and no answer to let her know that we had to reschedule her appointment to June due to Dr. Esparza having to go back in to the hospital

## 2020-06-15 ENCOUNTER — TELEPHONE (OUTPATIENT)
Dept: RHEUMATOLOGY | Facility: CLINIC | Age: 60
End: 2020-06-15

## 2020-06-16 ENCOUNTER — OFFICE VISIT (OUTPATIENT)
Dept: RHEUMATOLOGY | Facility: CLINIC | Age: 60
End: 2020-06-16
Payer: MEDICARE

## 2020-06-16 ENCOUNTER — TELEPHONE (OUTPATIENT)
Dept: PHARMACY | Facility: CLINIC | Age: 60
End: 2020-06-16

## 2020-06-16 ENCOUNTER — LAB VISIT (OUTPATIENT)
Dept: LAB | Facility: HOSPITAL | Age: 60
End: 2020-06-16
Attending: INTERNAL MEDICINE
Payer: MEDICARE

## 2020-06-16 VITALS
HEART RATE: 80 BPM | WEIGHT: 192.88 LBS | SYSTOLIC BLOOD PRESSURE: 142 MMHG | DIASTOLIC BLOOD PRESSURE: 79 MMHG | HEIGHT: 65 IN | BODY MASS INDEX: 32.14 KG/M2 | TEMPERATURE: 99 F

## 2020-06-16 DIAGNOSIS — M05.79 RHEUMATOID ARTHRITIS INVOLVING MULTIPLE SITES WITH POSITIVE RHEUMATOID FACTOR: ICD-10-CM

## 2020-06-16 DIAGNOSIS — M05.79 RHEUMATOID ARTHRITIS INVOLVING MULTIPLE SITES WITH POSITIVE RHEUMATOID FACTOR: Primary | ICD-10-CM

## 2020-06-16 LAB
ALBUMIN SERPL BCP-MCNC: 3.8 G/DL (ref 3.5–5.2)
ALP SERPL-CCNC: 119 U/L (ref 55–135)
ALT SERPL W/O P-5'-P-CCNC: 19 U/L (ref 10–44)
ANION GAP SERPL CALC-SCNC: 7 MMOL/L (ref 8–16)
AST SERPL-CCNC: 15 U/L (ref 10–40)
BASOPHILS # BLD AUTO: 0.04 K/UL (ref 0–0.2)
BASOPHILS NFR BLD: 0.7 % (ref 0–1.9)
BILIRUB SERPL-MCNC: 0.3 MG/DL (ref 0.1–1)
BUN SERPL-MCNC: 12 MG/DL (ref 6–20)
CALCIUM SERPL-MCNC: 9.2 MG/DL (ref 8.7–10.5)
CHLORIDE SERPL-SCNC: 108 MMOL/L (ref 95–110)
CO2 SERPL-SCNC: 28 MMOL/L (ref 23–29)
CREAT SERPL-MCNC: 0.8 MG/DL (ref 0.5–1.4)
CRP SERPL-MCNC: 0.8 MG/L (ref 0–8.2)
DIFFERENTIAL METHOD: ABNORMAL
EOSINOPHIL # BLD AUTO: 0.4 K/UL (ref 0–0.5)
EOSINOPHIL NFR BLD: 6.5 % (ref 0–8)
ERYTHROCYTE [DISTWIDTH] IN BLOOD BY AUTOMATED COUNT: 16.2 % (ref 11.5–14.5)
ERYTHROCYTE [SEDIMENTATION RATE] IN BLOOD BY WESTERGREN METHOD: 3 MM/HR (ref 0–36)
EST. GFR  (AFRICAN AMERICAN): >60 ML/MIN/1.73 M^2
EST. GFR  (NON AFRICAN AMERICAN): >60 ML/MIN/1.73 M^2
GLUCOSE SERPL-MCNC: 213 MG/DL (ref 70–110)
HCT VFR BLD AUTO: 37.4 % (ref 37–48.5)
HGB BLD-MCNC: 11.7 G/DL (ref 12–16)
IMM GRANULOCYTES # BLD AUTO: 0.01 K/UL (ref 0–0.04)
IMM GRANULOCYTES NFR BLD AUTO: 0.2 % (ref 0–0.5)
LYMPHOCYTES # BLD AUTO: 2.5 K/UL (ref 1–4.8)
LYMPHOCYTES NFR BLD: 46.1 % (ref 18–48)
MCH RBC QN AUTO: 24.5 PG (ref 27–31)
MCHC RBC AUTO-ENTMCNC: 31.3 G/DL (ref 32–36)
MCV RBC AUTO: 78 FL (ref 82–98)
MONOCYTES # BLD AUTO: 0.4 K/UL (ref 0.3–1)
MONOCYTES NFR BLD: 8.2 % (ref 4–15)
NEUTROPHILS # BLD AUTO: 2.1 K/UL (ref 1.8–7.7)
NEUTROPHILS NFR BLD: 38.3 % (ref 38–73)
NRBC BLD-RTO: 0 /100 WBC
PLATELET # BLD AUTO: 334 K/UL (ref 150–350)
PMV BLD AUTO: 9 FL (ref 9.2–12.9)
POTASSIUM SERPL-SCNC: 4.2 MMOL/L (ref 3.5–5.1)
PROT SERPL-MCNC: 7.5 G/DL (ref 6–8.4)
RBC # BLD AUTO: 4.78 M/UL (ref 4–5.4)
SODIUM SERPL-SCNC: 143 MMOL/L (ref 136–145)
WBC # BLD AUTO: 5.36 K/UL (ref 3.9–12.7)

## 2020-06-16 PROCEDURE — 85652 RBC SED RATE AUTOMATED: CPT

## 2020-06-16 PROCEDURE — 36415 COLL VENOUS BLD VENIPUNCTURE: CPT

## 2020-06-16 PROCEDURE — 99999 PR PBB SHADOW E&M-EST. PATIENT-LVL V: ICD-10-PCS | Mod: PBBFAC,,, | Performed by: INTERNAL MEDICINE

## 2020-06-16 PROCEDURE — 99214 OFFICE O/P EST MOD 30 MIN: CPT | Mod: 25,S$GLB,, | Performed by: INTERNAL MEDICINE

## 2020-06-16 PROCEDURE — 96372 PR INJECTION,THERAP/PROPH/DIAG2ST, IM OR SUBCUT: ICD-10-PCS | Mod: S$GLB,,, | Performed by: INTERNAL MEDICINE

## 2020-06-16 PROCEDURE — 85025 COMPLETE CBC W/AUTO DIFF WBC: CPT

## 2020-06-16 PROCEDURE — 99999 PR PBB SHADOW E&M-EST. PATIENT-LVL V: CPT | Mod: PBBFAC,,, | Performed by: INTERNAL MEDICINE

## 2020-06-16 PROCEDURE — 99214 PR OFFICE/OUTPT VISIT, EST, LEVL IV, 30-39 MIN: ICD-10-PCS | Mod: 25,S$GLB,, | Performed by: INTERNAL MEDICINE

## 2020-06-16 PROCEDURE — 3008F BODY MASS INDEX DOCD: CPT | Mod: CPTII,S$GLB,, | Performed by: INTERNAL MEDICINE

## 2020-06-16 PROCEDURE — 80053 COMPREHEN METABOLIC PANEL: CPT

## 2020-06-16 PROCEDURE — 96372 THER/PROPH/DIAG INJ SC/IM: CPT | Mod: S$GLB,,, | Performed by: INTERNAL MEDICINE

## 2020-06-16 PROCEDURE — 3008F PR BODY MASS INDEX (BMI) DOCUMENTED: ICD-10-PCS | Mod: CPTII,S$GLB,, | Performed by: INTERNAL MEDICINE

## 2020-06-16 PROCEDURE — 86140 C-REACTIVE PROTEIN: CPT

## 2020-06-16 RX ORDER — METFORMIN HYDROCHLORIDE 1000 MG/1
TABLET ORAL
COMMUNITY
Start: 2020-04-12 | End: 2020-10-20 | Stop reason: SDUPTHER

## 2020-06-16 RX ORDER — ALBUTEROL SULFATE 108 UG/1
AEROSOL, METERED RESPIRATORY (INHALATION)
COMMUNITY
Start: 2020-03-17 | End: 2020-10-20 | Stop reason: SDUPTHER

## 2020-06-16 RX ORDER — NAPROXEN 500 MG/1
TABLET ORAL
COMMUNITY
Start: 2020-05-12 | End: 2021-07-20

## 2020-06-16 RX ORDER — OMEPRAZOLE 40 MG/1
CAPSULE, DELAYED RELEASE ORAL
COMMUNITY
Start: 2020-03-17 | End: 2020-10-20 | Stop reason: SDUPTHER

## 2020-06-16 RX ORDER — ATORVASTATIN CALCIUM 20 MG
TABLET ORAL
COMMUNITY
Start: 2020-04-13 | End: 2020-10-20 | Stop reason: SDUPTHER

## 2020-06-16 RX ORDER — ALBUTEROL SULFATE 0.83 MG/ML
SOLUTION RESPIRATORY (INHALATION)
COMMUNITY
Start: 2020-03-31

## 2020-06-16 RX ORDER — HYDROCHLOROTHIAZIDE 25 MG/1
TABLET ORAL
COMMUNITY
Start: 2020-04-13

## 2020-06-16 RX ORDER — ENALAPRIL MALEATE 20 MG/1
TABLET ORAL
COMMUNITY
Start: 2020-05-01

## 2020-06-16 RX ORDER — FLUTICASONE PROPIONATE 50 MCG
SPRAY, SUSPENSION (ML) NASAL
COMMUNITY
Start: 2020-04-13

## 2020-06-16 RX ORDER — INSULIN LISPRO 100 [IU]/ML
INJECTION, SOLUTION INTRAVENOUS; SUBCUTANEOUS
COMMUNITY
Start: 2020-03-18 | End: 2022-04-04

## 2020-06-16 RX ORDER — TRIAMCINOLONE ACETONIDE 40 MG/ML
40 INJECTION, SUSPENSION INTRA-ARTICULAR; INTRAMUSCULAR
Status: COMPLETED | OUTPATIENT
Start: 2020-06-16 | End: 2020-06-16

## 2020-06-16 RX ORDER — AMLODIPINE BESYLATE 10 MG/1
TABLET ORAL
COMMUNITY
Start: 2020-04-13

## 2020-06-16 RX ORDER — INSULIN DETEMIR 100 [IU]/ML
INJECTION, SOLUTION SUBCUTANEOUS
COMMUNITY
Start: 2020-04-03 | End: 2022-04-04

## 2020-06-16 RX ORDER — GABAPENTIN 100 MG/1
CAPSULE ORAL
COMMUNITY
Start: 2020-06-14 | End: 2021-02-17

## 2020-06-16 RX ORDER — MELOXICAM 7.5 MG/1
TABLET ORAL
COMMUNITY
Start: 2020-06-11 | End: 2020-10-20 | Stop reason: SDUPTHER

## 2020-06-16 RX ORDER — ETANERCEPT 50 MG/ML
50 SOLUTION SUBCUTANEOUS WEEKLY
Qty: 4 ML | Refills: 11 | Status: SHIPPED | OUTPATIENT
Start: 2020-06-16 | End: 2021-02-17

## 2020-06-16 RX ORDER — CYCLOBENZAPRINE HCL 10 MG
TABLET ORAL
COMMUNITY
Start: 2020-04-13 | End: 2022-03-17

## 2020-06-16 RX ADMIN — TRIAMCINOLONE ACETONIDE 40 MG: 40 INJECTION, SUSPENSION INTRA-ARTICULAR; INTRAMUSCULAR at 11:06

## 2020-06-16 NOTE — PROGRESS NOTES
Chief Complaint   Patient presents with    Disease Management       Patient with rheumatoid arthritis for a follow up    History of presenting illness    59 year old black female comes in with a new diagnosis of rheumatoid arthritis    She has joint pains for 2 years    Feet hurt  Hands hurt  Neck hurts    Elbows,shoulders,wrists can hurt some  Back can hurt some    Pain,swelling,stiffness+  EMS+    Right 2nd finger is stiff and doesn't bend    No skin rashes,malar rash,photosensitivity  No telangiectasias  No calcinosis     No patchy alopecia  No oral and nasal ulcers  No sicca symptoms   No pleurisy or any cardiopulmonary complaints  No dysphagia,diplopia and dysphonia and muscle weakness  No n/v/d/c  No acid reflux+  No raynaud's+  No digital ulcers     No cytopenias  No renal issues  No blood clots     No fever,chills,night sweats,weight loss and loss of appetite     No pregnancy losses    A PCP diagnosed her with rheumatoid arthritis  Sent her to rheumatology on [place] : got mtx : 4 pills weekly/folic acid  She has headaches and she cant tolerate it   She had a rash    We did the whole panel    CBC nml except for low MCV  High glucose 293  ESR,CRP nml    CCP neg  Pre dmard panel neg  Uric acid nml  ULISES,SSA neg  HLA B27 neg    Arthritis survey    Mild deg changes in the neck  Deg changes in the hands and feet and knees  CXR nml    Humira offered   She stopped it     She started taking turmeric    Then we gave ssz and plaquenil  She didn't like it as well    She has ongoing pain and swelling in the hands and feet  Today she comes in with severe left foot pain and swelling  Shoulders continues to bother     She got covid in march  She had mild symptoms   Not hospitalised   Recovered     Last labs 1/2020    CBC nml  CMP high glucose  ESR,CRP nml    Past history : dm,htn    Family history : mom has arthritis     Social history : former smoker,quit 20 years ago        Review of Systems   Constitutional:  Negative for activity change, appetite change, chills, diaphoresis, fatigue, fever and unexpected weight change.   HENT: Negative for congestion, dental problem, drooling, ear discharge, ear pain, facial swelling, hearing loss, mouth sores, nosebleeds, postnasal drip, rhinorrhea, sinus pressure, sinus pain, sneezing, sore throat, tinnitus, trouble swallowing and voice change.    Eyes: Negative for photophobia, pain, discharge, redness, itching and visual disturbance.   Respiratory: Negative for apnea, cough, choking, chest tightness, shortness of breath, wheezing and stridor.    Cardiovascular: Negative for chest pain, palpitations and leg swelling.   Gastrointestinal: Negative for abdominal distention, abdominal pain, anal bleeding, blood in stool, constipation, diarrhea, nausea, rectal pain and vomiting.   Endocrine: Negative for cold intolerance, heat intolerance, polydipsia, polyphagia and polyuria.   Genitourinary: Negative for decreased urine volume, difficulty urinating, dysuria, enuresis, flank pain, frequency, genital sores, hematuria and urgency.   Musculoskeletal: Positive for arthralgias. Negative for back pain, gait problem, joint swelling, myalgias, neck pain and neck stiffness.   Skin: Negative for color change, pallor, rash and wound.   Allergic/Immunologic: Negative for environmental allergies, food allergies and immunocompromised state.   Neurological: Negative for dizziness, tremors, seizures, syncope, facial asymmetry, speech difficulty, weakness, light-headedness, numbness and headaches.   Hematological: Negative for adenopathy. Does not bruise/bleed easily.   Psychiatric/Behavioral: Negative for agitation, behavioral problems, confusion, decreased concentration, dysphoric mood, hallucinations, self-injury, sleep disturbance and suicidal ideas. The patient is not nervous/anxious and is not hyperactive.      Physical Exam     RAMIREZ-28 tender joint count: 0  RAMIREZ-28 swollen joint count: 0    PAIN  8  FATIGUE 8  PTGL 8  EMS All day    Left foot swelling noted    Physical Exam   Constitutional: She is oriented to person, place, and time and well-developed, well-nourished, and in no distress. No distress.   HENT:   Head: Normocephalic.   Mouth/Throat: Oropharynx is clear and moist.   Eyes: Conjunctivae are normal. Pupils are equal, round, and reactive to light. Right eye exhibits no discharge. Left eye exhibits no discharge. No scleral icterus.   Neck: Normal range of motion. No thyromegaly present.   Cardiovascular: Normal rate, regular rhythm, normal heart sounds and intact distal pulses.    Pulmonary/Chest: Effort normal and breath sounds normal. No stridor.   Abdominal: Soft. Bowel sounds are normal.   Lymphadenopathy:     She has no cervical adenopathy.   Neurological: She is alert and oriented to person, place, and time.   Skin: Skin is warm. No rash noted. She is not diaphoretic.     Psychiatric: Affect and judgment normal.   Musculoskeletal: Normal range of motion.         Assessment     59 year old black female with DM,HTN comes in with a new diagnosis of rheumatoid arthritis    She has joint pains for 2 years    Feet hurt  Hands hurt  Neck hurts    Elbows,shoulders,wrists can hurt some  Back can hurt some    Pain,swelling,stiffness+  EMS+    Right 2nd finger is stiff and doesn't bend    A PCP diagnosed her with rheumatoid arthritis  Sent her to rheumatology on [place] : got mtx : 4 pills weekly/folic acid  She has headaches and she cant tolerate it and has a rash with it     RF positive  CCP neg    She was on humira and she had some headaches   She saw ad on the tv and got scared of the side effects  She doesn't want to do biologics now    She does have moderate disease activity today    Humira offered   She stopped it     She started taking turmeric    Then we gave ssz and plaquenil  She didn't like it as well    She has ongoing pain and swelling in the hands and feet  Right shoulder hurts    She got  covid in march  She had mild symptoms   Not hospitalised     1. Rheumatoid arthritis involving multiple sites with positive rheumatoid factor            F/u problem     Plan    OFFERED enbrel 50 mg weekly    She now takes difelikefalin for eczema     She also took gabapentin  meloxicam prn    Voltaren gel topical    Labs today    VACCINATIONS : Flu,pneumonia and shingles    Kenalog shot      Marisol was seen today for disease management.    Diagnoses and all orders for this visit:    Rheumatoid arthritis involving multiple sites with positive rheumatoid factor  -     etanercept (ENBREL SURECLICK) 50 mg/mL (1 mL); Inject 1 mL (50 mg total) into the skin once a week.  -     CBC auto differential; Future  -     Comprehensive metabolic panel; Future  -     Sedimentation rate; Future  -     C-Reactive Protein; Future    Other orders  -     triamcinolone acetonide injection 40 mg        rtc in 3 months

## 2020-06-17 NOTE — TELEPHONE ENCOUNTER
Initial Enbrel Sureclick 50mg/ml Injection consult completed on 6/17. Patient picked-up Enbrel from [facility].  Therapy Appropriate.    Counseled patient on administration directions:  -  Inject Enbrel 50mg into the skin every 7 days.  .- Take out of the refrigerator 30-60 minutes prior to injection.  - Wash hands before and after injection.  - Monthly RX will come with gauze, bandaids, and alcohol swabs.  - Patient may inject in either the tops of the thighs, abdomen- but at least 2 inches away from her belly button, or the outer part of her upper arm.  Patient was instructed to rotate injections sites.  - Patient is to wipe down the injection site with the alcohol pad, wait to dry.  Gently squeeze the area of the cleaned skin and hold it firmly.   Place the pen flat against the raised area of skin that is being squeezed, then push down on the button and hold for 10-15 seconds, until the window has gone from clear to yellow.  - Patient should rotate injection sites.   - Patient will use sharps container; once full, per JOE gibson, she/ he may lock the sharps container and place in her trash. She/ he can then contact the Pharmacy and we will replace the sharps at no additional charge.    Patient was counseled on possible side effects:  - Injection site reaction: redness, soreness, itching, bruising, which should resolve within 3-5 days.  - Increased risk for infections. If patient becomes sick, patient is to hold Enbrel use until she/ he is better.     DDIs:  Medication list reviewed and potential DDIs addressed.    Patient verbalized understanding. Compliance stressed. Patient advised to keep a calendar marking dates of injections to ensure better compliance. Patient advised to call myself or provider should any questions arise.  Patient understands to report any medication changes to [facility] and provider. All questions answered and addressed to patient's satisfaction. [facility] to contact patient in 3 weeks for refills.

## 2020-06-17 NOTE — TELEPHONE ENCOUNTER
DOCUMENTATION ONLY:     Prior Authorization for Enbrel APPROVED from 6/16/20 to 12/31/20.      Case ID# [id number]    Co-Pay: $3.90    Patient Assistance IS NOT required.     Forward to clinical pharmacist for consult & shipment.      ROBERT

## 2020-06-19 ENCOUNTER — TELEPHONE (OUTPATIENT)
Dept: RHEUMATOLOGY | Facility: CLINIC | Age: 60
End: 2020-06-19

## 2020-06-19 NOTE — TELEPHONE ENCOUNTER
----- Message from Veronica Rebollar sent at 6/19/2020  2:39 PM CDT -----  Pt has some questions about the medication she prescribed.        Contact info [phone]

## 2020-07-14 ENCOUNTER — TELEPHONE (OUTPATIENT)
Dept: PHARMACY | Facility: CLINIC | Age: 60
End: 2020-07-14

## 2020-07-31 ENCOUNTER — TELEPHONE (OUTPATIENT)
Dept: RHEUMATOLOGY | Facility: CLINIC | Age: 60
End: 2020-07-31

## 2020-07-31 NOTE — TELEPHONE ENCOUNTER
Returned the windy'ts call from today asking her to leave a more detailed message as to better help her in regards to her medication.

## 2020-08-03 ENCOUNTER — TELEPHONE (OUTPATIENT)
Dept: PHARMACY | Facility: CLINIC | Age: 60
End: 2020-08-03

## 2020-09-25 ENCOUNTER — TELEPHONE (OUTPATIENT)
Dept: PHARMACY | Facility: CLINIC | Age: 60
End: 2020-09-25

## 2020-09-25 NOTE — TELEPHONE ENCOUNTER
Pt plans to pickup Enbrel refill on . Name and  verified. $0 copay in 004. Pt is in need of a new sharps container. Pt has 0 doses on hand, next dose due . Pt reported no missed doses. Pt did not start any new medications. Pt had no further questions or concerns.

## 2020-10-05 DIAGNOSIS — I10 ESSENTIAL (PRIMARY) HYPERTENSION: Primary | ICD-10-CM

## 2020-10-12 ENCOUNTER — HOSPITAL ENCOUNTER (OUTPATIENT)
Dept: CARDIOLOGY | Facility: HOSPITAL | Age: 60
Discharge: HOME | End: 2020-10-12
Attending: NURSE PRACTITIONER
Payer: MEDICARE

## 2020-10-12 VITALS — WEIGHT: 186 LBS | BODY MASS INDEX: 30.99 KG/M2 | HEIGHT: 65 IN

## 2020-10-12 DIAGNOSIS — I10 ESSENTIAL (PRIMARY) HYPERTENSION: ICD-10-CM

## 2020-10-12 LAB
ASCENDING AORTA: 2.62 CM
BSA FOR ECHO PROCEDURE: 1.97 M2
CV ECHO LV RWT: 0.4 CM
CV STRESS BASE HR: 74 BPM
DIASTOLIC BLOOD PRESSURE: 75 MMHG
DOP CALC LVOT AREA: 3.3 CM2
DOP CALC LVOT DIAMETER: 2.06 CM
DOP CALC LVOT PEAK VEL: 0.97 M/S
DOP CALC LVOT STROKE VOLUME: 71.29 CM3
DOP CALCLVOT PEAK VEL VTI: 21.4 CM
E WAVE DECELERATION TIME: 203.87 MSEC
E/A RATIO: 1.09
E/E' RATIO: 9.5 M/S
ECHO LV POSTERIOR WALL: 0.99 CM (ref 0.6–1.1)
FRACTIONAL SHORTENING: 29 % (ref 28–44)
INTERVENTRICULAR SEPTUM: 0.96 CM (ref 0.6–1.1)
IVRT: 88.49 MSEC
LA MAJOR: 5.36 CM
LA MINOR: 5.31 CM
LA WIDTH: 3.99 CM
LEFT ATRIUM SIZE: 3.85 CM
LEFT ATRIUM VOLUME INDEX: 36.3 ML/M2
LEFT ATRIUM VOLUME: 69.66 CM3
LEFT INTERNAL DIMENSION IN SYSTOLE: 3.48 CM (ref 2.1–4)
LEFT VENTRICLE DIASTOLIC VOLUME INDEX: 59.16 ML/M2
LEFT VENTRICLE DIASTOLIC VOLUME: 113.48 ML
LEFT VENTRICLE MASS INDEX: 89 G/M2
LEFT VENTRICLE SYSTOLIC VOLUME INDEX: 26.1 ML/M2
LEFT VENTRICLE SYSTOLIC VOLUME: 50 ML
LEFT VENTRICULAR INTERNAL DIMENSION IN DIASTOLE: 4.91 CM (ref 3.5–6)
LEFT VENTRICULAR MASS: 170.71 G
LV LATERAL E/E' RATIO: 8.64 M/S
LV SEPTAL E/E' RATIO: 10.56 M/S
MV PEAK A VEL: 0.87 M/S
MV PEAK E VEL: 0.95 M/S
MV STENOSIS PRESSURE HALF TIME: 59.12 MS
MV VALVE AREA P 1/2 METHOD: 3.72 CM2
OHS CV CPX 1 MINUTE RECOVERY HEART RATE: 110 BPM
OHS CV CPX 85 PERCENT MAX PREDICTED HEART RATE MALE: 131
OHS CV CPX ESTIMATED METS: 8
OHS CV CPX MAX PREDICTED HEART RATE: 154
OHS CV CPX PATIENT IS FEMALE: 1
OHS CV CPX PATIENT IS MALE: 0
OHS CV CPX PEAK DIASTOLIC BLOOD PRESSURE: 57 MMHG
OHS CV CPX PEAK HEAR RATE: 142 BPM
OHS CV CPX PEAK RATE PRESSURE PRODUCT: NORMAL
OHS CV CPX PEAK SYSTOLIC BLOOD PRESSURE: 196 MMHG
OHS CV CPX PERCENT MAX PREDICTED HEART RATE ACHIEVED: 92
OHS CV CPX RATE PRESSURE PRODUCT PRESENTING: NORMAL
PISA TR MAX VEL: 2.19 M/S
PULM VEIN S/D RATIO: 1.47
PV PEAK D VEL: 0.47 M/S
PV PEAK S VEL: 0.69 M/S
RA MAJOR: 4.77 CM
RA PRESSURE: 3 MMHG
RA WIDTH: 2.93 CM
RIGHT VENTRICULAR END-DIASTOLIC DIMENSION: 2.85 CM
RV TISSUE DOPPLER FREE WALL SYSTOLIC VELOCITY 1 (APICAL 4 CHAMBER VIEW): 14.82 CM/S
SINUS: 3 CM
STJ: 2.42 CM
STRESS ECHO POST EXERCISE DUR MIN: 5 MINUTES
STRESS ECHO POST EXERCISE DUR SEC: 8 SECONDS
SYSTOLIC BLOOD PRESSURE: 150 MMHG
TDI LATERAL: 0.11 M/S
TDI SEPTAL: 0.09 M/S
TDI: 0.1 M/S
TR MAX PG: 19 MMHG
TRICUSPID ANNULAR PLANE SYSTOLIC EXCURSION: 2.88 CM
TV REST PULMONARY ARTERY PRESSURE: 22 MMHG

## 2020-10-12 PROCEDURE — 93351 STRESS ECHO (CUPID ONLY): ICD-10-PCS | Mod: 26,,, | Performed by: INTERNAL MEDICINE

## 2020-10-12 PROCEDURE — 93351 STRESS TTE COMPLETE: CPT | Mod: 26,,, | Performed by: INTERNAL MEDICINE

## 2020-10-12 PROCEDURE — 93351 STRESS TTE COMPLETE: CPT

## 2020-10-14 ENCOUNTER — OFFICE VISIT (OUTPATIENT)
Dept: RHEUMATOLOGY | Facility: CLINIC | Age: 60
End: 2020-10-14
Payer: MEDICARE

## 2020-10-14 ENCOUNTER — LAB VISIT (OUTPATIENT)
Dept: LAB | Facility: HOSPITAL | Age: 60
End: 2020-10-14
Attending: INTERNAL MEDICINE
Payer: MEDICARE

## 2020-10-14 VITALS
HEIGHT: 66 IN | DIASTOLIC BLOOD PRESSURE: 85 MMHG | SYSTOLIC BLOOD PRESSURE: 150 MMHG | BODY MASS INDEX: 31.36 KG/M2 | HEART RATE: 85 BPM | WEIGHT: 195.13 LBS

## 2020-10-14 DIAGNOSIS — M05.79 RHEUMATOID ARTHRITIS INVOLVING MULTIPLE SITES WITH POSITIVE RHEUMATOID FACTOR: ICD-10-CM

## 2020-10-14 DIAGNOSIS — G89.29 CHRONIC RIGHT SHOULDER PAIN: ICD-10-CM

## 2020-10-14 DIAGNOSIS — M54.2 NECK PAIN: Primary | ICD-10-CM

## 2020-10-14 DIAGNOSIS — M25.511 CHRONIC RIGHT SHOULDER PAIN: ICD-10-CM

## 2020-10-14 LAB
ALBUMIN SERPL BCP-MCNC: 3.8 G/DL (ref 3.5–5.2)
ALP SERPL-CCNC: 106 U/L (ref 55–135)
ALT SERPL W/O P-5'-P-CCNC: 33 U/L (ref 10–44)
ANION GAP SERPL CALC-SCNC: 9 MMOL/L (ref 8–16)
AST SERPL-CCNC: 22 U/L (ref 10–40)
BASOPHILS # BLD AUTO: 0.03 K/UL (ref 0–0.2)
BASOPHILS NFR BLD: 0.5 % (ref 0–1.9)
BILIRUB SERPL-MCNC: 0.4 MG/DL (ref 0.1–1)
BUN SERPL-MCNC: 22 MG/DL (ref 6–20)
CALCIUM SERPL-MCNC: 9.5 MG/DL (ref 8.7–10.5)
CHLORIDE SERPL-SCNC: 104 MMOL/L (ref 95–110)
CO2 SERPL-SCNC: 28 MMOL/L (ref 23–29)
CREAT SERPL-MCNC: 0.7 MG/DL (ref 0.5–1.4)
CRP SERPL-MCNC: 20.1 MG/L (ref 0–8.2)
DIFFERENTIAL METHOD: ABNORMAL
EOSINOPHIL # BLD AUTO: 0.2 K/UL (ref 0–0.5)
EOSINOPHIL NFR BLD: 3.6 % (ref 0–8)
ERYTHROCYTE [DISTWIDTH] IN BLOOD BY AUTOMATED COUNT: 15.5 % (ref 11.5–14.5)
ERYTHROCYTE [SEDIMENTATION RATE] IN BLOOD BY WESTERGREN METHOD: 11 MM/HR (ref 0–36)
EST. GFR  (AFRICAN AMERICAN): >60 ML/MIN/1.73 M^2
EST. GFR  (NON AFRICAN AMERICAN): >60 ML/MIN/1.73 M^2
GLUCOSE SERPL-MCNC: 130 MG/DL (ref 70–110)
HCT VFR BLD AUTO: 37.9 % (ref 37–48.5)
HGB BLD-MCNC: 11.8 G/DL (ref 12–16)
IMM GRANULOCYTES # BLD AUTO: 0.01 K/UL (ref 0–0.04)
IMM GRANULOCYTES NFR BLD AUTO: 0.2 % (ref 0–0.5)
LYMPHOCYTES # BLD AUTO: 2.5 K/UL (ref 1–4.8)
LYMPHOCYTES NFR BLD: 44.9 % (ref 18–48)
MCH RBC QN AUTO: 23.9 PG (ref 27–31)
MCHC RBC AUTO-ENTMCNC: 31.1 G/DL (ref 32–36)
MCV RBC AUTO: 77 FL (ref 82–98)
MONOCYTES # BLD AUTO: 0.5 K/UL (ref 0.3–1)
MONOCYTES NFR BLD: 8.3 % (ref 4–15)
NEUTROPHILS # BLD AUTO: 2.4 K/UL (ref 1.8–7.7)
NEUTROPHILS NFR BLD: 42.5 % (ref 38–73)
NRBC BLD-RTO: 0 /100 WBC
PLATELET # BLD AUTO: 319 K/UL (ref 150–350)
PMV BLD AUTO: 8.8 FL (ref 9.2–12.9)
POTASSIUM SERPL-SCNC: 4 MMOL/L (ref 3.5–5.1)
PROT SERPL-MCNC: 7.3 G/DL (ref 6–8.4)
RBC # BLD AUTO: 4.93 M/UL (ref 4–5.4)
SODIUM SERPL-SCNC: 141 MMOL/L (ref 136–145)
WBC # BLD AUTO: 5.63 K/UL (ref 3.9–12.7)

## 2020-10-14 PROCEDURE — 85025 COMPLETE CBC W/AUTO DIFF WBC: CPT

## 2020-10-14 PROCEDURE — 86140 C-REACTIVE PROTEIN: CPT

## 2020-10-14 PROCEDURE — 99999 PR PBB SHADOW E&M-EST. PATIENT-LVL V: ICD-10-PCS | Mod: PBBFAC,,, | Performed by: INTERNAL MEDICINE

## 2020-10-14 PROCEDURE — 99999 PR PBB SHADOW E&M-EST. PATIENT-LVL V: CPT | Mod: PBBFAC,,, | Performed by: INTERNAL MEDICINE

## 2020-10-14 PROCEDURE — 3008F BODY MASS INDEX DOCD: CPT | Mod: CPTII,S$GLB,, | Performed by: INTERNAL MEDICINE

## 2020-10-14 PROCEDURE — 99214 PR OFFICE/OUTPT VISIT, EST, LEVL IV, 30-39 MIN: ICD-10-PCS | Mod: S$GLB,,, | Performed by: INTERNAL MEDICINE

## 2020-10-14 PROCEDURE — 99214 OFFICE O/P EST MOD 30 MIN: CPT | Mod: S$GLB,,, | Performed by: INTERNAL MEDICINE

## 2020-10-14 PROCEDURE — 3008F PR BODY MASS INDEX (BMI) DOCUMENTED: ICD-10-PCS | Mod: CPTII,S$GLB,, | Performed by: INTERNAL MEDICINE

## 2020-10-14 PROCEDURE — 85652 RBC SED RATE AUTOMATED: CPT

## 2020-10-14 PROCEDURE — 80053 COMPREHEN METABOLIC PANEL: CPT

## 2020-10-14 PROCEDURE — 36415 COLL VENOUS BLD VENIPUNCTURE: CPT

## 2020-10-14 RX ORDER — IBUPROFEN 100 MG/5ML
1000 SUSPENSION, ORAL (FINAL DOSE FORM) ORAL DAILY
COMMUNITY

## 2020-10-14 RX ORDER — ECHINACEA 500 MG
CAPSULE ORAL
COMMUNITY
End: 2022-03-17

## 2020-10-14 RX ORDER — ALBUTEROL SULFATE 108 UG/1
AEROSOL, METERED RESPIRATORY (INHALATION)
COMMUNITY
Start: 2020-09-30

## 2020-10-14 NOTE — PROGRESS NOTES
Chief Complaint   Patient presents with    Disease Management       Patient with rheumatoid arthritis for a follow up    History of presenting illness    59 year old black female comes in with a new diagnosis of rheumatoid arthritis    She has joint pains for 2 years    Feet hurt  Hands hurt  Neck hurts    Elbows,shoulders,wrists can hurt some  Back can hurt some    Pain,swelling,stiffness+  EMS+    Right 2nd finger is stiff and doesn't bend    No skin rashes,malar rash,photosensitivity  No telangiectasias  No calcinosis     No patchy alopecia  No oral and nasal ulcers  No sicca symptoms   No pleurisy or any cardiopulmonary complaints  No dysphagia,diplopia and dysphonia and muscle weakness  No n/v/d/c  No acid reflux+  No raynaud's+  No digital ulcers     No cytopenias  No renal issues  No blood clots     No fever,chills,night sweats,weight loss and loss of appetite     No pregnancy losses    A PCP diagnosed her with rheumatoid arthritis  Sent her to rheumatology on napolean : got mtx : 4 pills weekly/folic acid  She has headaches and she cant tolerate it   She had a rash    We did the whole panel    CBC nml except for low MCV  High glucose 293  ESR,CRP nml    CCP neg  Pre dmard panel neg  Uric acid nml  ULISES,SSA neg  HLA B27 neg    Arthritis survey    Mild deg changes in the neck  Deg changes in the hands and feet and knees  CXR nml    Humira offered   She stopped it due to fear of side effects    She started taking turmeric    Then we gave ssz and plaquenil since she didn't want to try biologics     She didn't like it as well    She had ongoing pain and swelling in the hands and feet    She was in a study for eczema difelikefalin so we couldn't start the enbrel    She got covid in march  She had mild symptoms   Not hospitalised   Recovered     She is now on enbrel weekly     She has only taken three doses on enbrel and finds benefit     Labs     6/2020    ESR,CRP nml  CMP high sugars  H/h 11.7/37.4  nml  white count   nml plts     Past history : dm,htn    Family history : mom has arthritis     Social history : former smoker,quit 20 years ago        Review of Systems   Constitutional: Negative for activity change, appetite change, chills, diaphoresis, fatigue, fever and unexpected weight change.   HENT: Negative for congestion, dental problem, drooling, ear discharge, ear pain, facial swelling, hearing loss, mouth sores, nosebleeds, postnasal drip, rhinorrhea, sinus pressure, sinus pain, sneezing, sore throat, tinnitus, trouble swallowing and voice change.    Eyes: Negative for photophobia, pain, discharge, redness, itching and visual disturbance.   Respiratory: Negative for apnea, cough, choking, chest tightness, shortness of breath, wheezing and stridor.    Cardiovascular: Negative for chest pain, palpitations and leg swelling.   Gastrointestinal: Negative for abdominal distention, abdominal pain, anal bleeding, blood in stool, constipation, diarrhea, nausea, rectal pain and vomiting.   Endocrine: Negative for cold intolerance, heat intolerance, polydipsia, polyphagia and polyuria.   Genitourinary: Negative for decreased urine volume, difficulty urinating, dysuria, enuresis, flank pain, frequency, genital sores, hematuria and urgency.   Musculoskeletal: Positive for arthralgias. Negative for back pain, gait problem, joint swelling, myalgias, neck pain and neck stiffness.   Skin: Negative for color change, pallor, rash and wound.   Allergic/Immunologic: Negative for environmental allergies, food allergies and immunocompromised state.   Neurological: Negative for dizziness, tremors, seizures, syncope, facial asymmetry, speech difficulty, weakness, light-headedness, numbness and headaches.   Hematological: Negative for adenopathy. Does not bruise/bleed easily.   Psychiatric/Behavioral: Negative for agitation, behavioral problems, confusion, decreased concentration, dysphoric mood, hallucinations, self-injury, sleep  disturbance and suicidal ideas. The patient is not nervous/anxious and is not hyperactive.      Physical Exam     RAMIREZ-28 tender joint count: 0  RAMIREZ-28 swollen joint count: 0    HOMUNCULUS UPDATED     Physical Exam   Constitutional: She is oriented to person, place, and time and well-developed, well-nourished, and in no distress. No distress.   HENT:   Head: Normocephalic.   Mouth/Throat: Oropharynx is clear and moist.   Eyes: Conjunctivae are normal. Pupils are equal, round, and reactive to light. Right eye exhibits no discharge. Left eye exhibits no discharge. No scleral icterus.   Neck: Normal range of motion. No thyromegaly present.   Cardiovascular: Normal rate, regular rhythm, normal heart sounds and intact distal pulses.    Pulmonary/Chest: Effort normal and breath sounds normal. No stridor.   Abdominal: Soft. Bowel sounds are normal.   Lymphadenopathy:     She has no cervical adenopathy.   Neurological: She is alert and oriented to person, place, and time.   Skin: Skin is warm. No rash noted. She is not diaphoretic.     Psychiatric: Affect and judgment normal.   Musculoskeletal: Normal range of motion.         Assessment     60 year old black female with DM,HTN comes in with a new diagnosis of rheumatoid arthritis    She has joint pains for 2 years    Feet hurt  Hands hurt  Neck hurts    Elbows,shoulders,wrists can hurt some  Back can hurt some    Pain,swelling,stiffness+  EMS+    Right 2nd finger is stiff and doesn't bend    A PCP diagnosed her with rheumatoid arthritis  Sent her to rheumatology on napolean : got mtx : 4 pills weekly/folic acid  She has headaches and she cant tolerate it and has a rash with it     RF positive  CCP neg    She was on humira and she had some headaches   She saw ad on the tv and got scared of the side effects  She then tried ssz and plaquenil and that didn't work    Finally she agreed to start enbrel  She went on a drug trial for eczema and we couldn't give enbrel   She started  enbrel 3 weeks ago    Complaints    -morning stiffness for 2 hours  -pain in the neck and right shoulder and arm  -hands continue to hurt  -fatigue     RAMIREZ 28 CRP 4.78  Moderate disease activity    CDAI 30    We only started enbrel 3 weeks ago  It might be too early to make a change     1. Neck pain    2. Chronic right shoulder pain    3. Rheumatoid arthritis involving multiple sites with positive rheumatoid factor            F/u problem     Plan    Continue enbrel 50 mg weekly    Blood work today    In 3 months if the disease activity does not change we will d.c enbrel and offer a different biologic     Stress echo recently :     · The test was stopped because the patient experienced atypical leg pain.  · The patient's exercise capacity was mildly impaired.  · There were no arrhythmias during stress.  · The ECG portion of this study is negative for myocardial ischemia.  · The left ventricle is normal in size with normal systolic function. The estimated ejection fraction is 63%.  · Normal left ventricular diastolic function.  · Mild left atrial enlargement.  · Normal right ventricular systolic function.  · Normal central venous pressure (3 mmHg).  · The estimated PA systolic pressure is 22 mmHg.  · The stress echo portion of this study is negative for myocardial ischemia.     Neck and shoulder chiropractic manipulation requested by patient     Vaccines : flu,pneumonia and shingles DENITA Damon was seen today for disease management.    Diagnoses and all orders for this visit:    Neck pain  -     Ambulatory referral/consult to Chiropractic; Future    Chronic right shoulder pain  -     Ambulatory referral/consult to Chiropractic; Future    Rheumatoid arthritis involving multiple sites with positive rheumatoid factor  -     CBC auto differential; Standing  -     Comprehensive Metabolic Panel; Standing  -     Sedimentation rate; Standing  -     C-Reactive Protein; Standing        rtc in 3 months

## 2020-10-20 ENCOUNTER — SPECIALTY PHARMACY (OUTPATIENT)
Dept: PHARMACY | Facility: CLINIC | Age: 60
End: 2020-10-20

## 2020-10-20 ENCOUNTER — TELEPHONE (OUTPATIENT)
Dept: RHEUMATOLOGY | Facility: CLINIC | Age: 60
End: 2020-10-20

## 2020-10-20 DIAGNOSIS — M05.741 RHEUMATOID ARTHRITIS INVOLVING BOTH HANDS WITH POSITIVE RHEUMATOID FACTOR: Primary | ICD-10-CM

## 2020-10-20 DIAGNOSIS — M05.742 RHEUMATOID ARTHRITIS INVOLVING BOTH HANDS WITH POSITIVE RHEUMATOID FACTOR: Primary | ICD-10-CM

## 2020-10-20 NOTE — TELEPHONE ENCOUNTER
"Specialty Pharmacy - Clinical Reassessment  Specialty Pharmacy - Refill Coordination    Specialty Medication Orders Linked to Encounter      Most Recent Value   Medication #1  etanercept (ENBREL SURECLICK) 50 mg/mL (1 mL) (Order#[id number], Rx#[id number])        Tang Rosales is a 60 y.o. female, who is followed by the specialty pharmacy service for management and education.    Encounters since last clinical assessment   No encounters found.   Clinical call attempts since last clinical assessment   10/19/2020  5:10 PM - Specialty Pharmacy - Clinical Reassessment by Eliza Witt, [place]     Today she received follow up education for her specialty medication(s).    Current Outpatient Medications   Medication Sig    albuterol (PROVENTIL) 2.5 mg /3 mL (0.083 %) nebulizer solution     amLODIPine (NORVASC) 10 MG tablet     ascorbic acid, vitamin C, (VITAMIN C) 1000 MG tablet Take 1,000 mg by mouth once daily.    aspirin (ECOTRIN) 81 MG EC tablet Take 81 mg by mouth once daily.    atorvastatin (LIPITOR) 20 MG tablet     BASAGLAR KWIKPEN U-100 INSULIN glargine 100 units/mL (3mL) SubQ pen     BD ULTRA-FINE SHORT PEN NEEDLE 31 gauge x 5/16" Ndle     betamethasone dipropionate (DIPROLENE) 0.05 % cream     cetirizine (ZYRTEC) 10 MG tablet Take 10 mg by mouth once daily.    clobetasol 0.05% (TEMOVATE) 0.05 % Oint Apply to affected area BID    cyclobenzaprine (FLEXERIL) 10 MG tablet     diclofenac sodium (VOLTAREN) 1 % Gel Apply 2 g topically 4 (four) times daily.    echinacea 500 mg Cap Take by mouth.    ELDERBERRY FRUIT ORAL Take by mouth.    enalapril (VASOTEC) 20 MG tablet     etanercept (ENBREL SURECLICK) 50 mg/mL (1 mL) Inject 1 mL (50 mg total) into the skin once a week.    fluticasone propionate (FLONASE) 50 mcg/actuation nasal spray     gabapentin (NEURONTIN) 100 MG capsule     hydroCHLOROthiazide (HYDRODIURIL) 25 MG tablet     insulin lispro 100 unit/mL pen     LEVEMIR FLEXTOUCH U-100 " "INSULN 100 unit/mL (3 mL) InPn pen     meloxicam (MOBIC) 7.5 MG tablet Take 7.5 mg by mouth once daily.    metFORMIN (GLUMETZA) 1000 MG (MOD) 24 hr tablet Take 1,000 mg by mouth daily with breakfast.    naproxen (NAPROSYN) 500 MG tablet     nebulizer and compressor (VIOS AEROSOL DELIVERY SYSTEM) Nika USE TID UTD    NOVOLOG FLEXPEN U-100 INSULIN 100 unit/mL (3 mL) InPn pen     omeprazole (PRILOSEC) 40 MG capsule     PROVENTIL HFA 90 mcg/actuation inhaler     triamcinolone acetonide 0.1% (KENALOG) 0.1 % cream Apply topically.    vit A and D3 in cod liver oiL (COD LIVER OIL) 4,000 unit-400 unit/5 mL Liqd Take by mouth.   Last reviewed on 10/20/2020 10:43 AM by Eliza Witt, PharmD    Review of patient's allergies indicates:   Allergen Reactions    Codeine Other (See Comments)     "it makes me feel crazy"    Prednisone Palpitations     "it makes my heart beat fast. I can take the shot"   Last reviewed on  10/20/2020 10:43 AM by Eliza Witt    Drug Interactions    Drug interactions evaluated: yes  Clinically relevant drug interactions identified: no  Provided the patient with educational material regarding drug interactions: not applicable       Medication Adherence    Patient reported X missed doses in the last month: 0  Any gaps in refill history greater than 2 weeks in the last 3 months: no  Demonstrates understanding of importance of adherence: yes  Provider-estimated medication adherence level: good  Reasons for non-adherence: no problems identified  Support network for adherence: family member  Confirmed plan for next specialty medication refill: pick-up at pharmacy  Refills needed for supportive medications: not needed       Adverse Effects    Joint swelling: Pos       Assessment Questions - Documented Responses      Most Recent Value   Assessment   Medication Reconciliation completed for patient  Yes   During the past 4 weeks, has patient missed any activities due to condition or medication?  No "   During the past 4 weeks, did patient have any of the following urgent care visits?  None   Goals of Therapy Status  Partially achieving   Assesment completed?  Yes   Plan  Therapy continued   Do you need to open a clinical intervention (i-vent)?  No   Do you want to schedule first shipment?  No   Medication #1 Assessment Info   Patient status  Existing medication, Exisiting to [facility]   Is this medication appropriate for the patient?  Yes   Is this medication effective?  Yes        Refill Questions - Documented Responses      Most Recent Value   Relationship to patient of person spoken to?  Self   HIPAA/medical authority confirmed?  Yes   Any changes in contact preferences or allowed representatives?  No   Has the patient had any insurance changes?  No   Has the patient had any changes to specialty medication, dose, or instructions?  No   Has the patient started taking any new medications, herbals, or supplements?  No   Has the patient been diagnosed with any new medical conditions?  No   Does the patient have any new allergies to medications or foods?  No   Does the patient have any concerns about side effects?  No   Can the patient store medication/sharps container properly (at the correct temperature, away from children/pets, etc.)?  Yes   Can the patient call emergency services (911) in the event of an emergency?  Yes   Does the patient have any concerns or questions about taking or administering this medication as prescribed?  No   How many doses did the patient miss in the past 4 weeks or since the last fill?  0   How many doses does the patient have on hand?  1   Does the number of doses/days supply remaining match pharmacy expected amounts?  Yes   How will the patient receive the medication?  Pickup   When does the patient need to receive the medication?  10/28/20   Expected Copay ($)  0   Is the patient able to afford the medication copay?  Yes   Payment Method  zero copay   Days supply of Refill  28   Would  "patient like to speak to a pharmacist?  No   Do you want to trigger an intervention?  No   Do you want to trigger an additional referral task?  No   Refill activity completed?  Yes   Refill activity plan  Refill scheduled   Shipment/Pickup Date:  10/27/20          Objective    She has a past medical history of Allergy, Asthma, Back pain, Diabetes mellitus, and Hypertension.    Tried/failed medications: Humira, MTX    BP Readings from Last 4 Encounters:   10/14/20 (!) 150/85   06/16/20 (!) 142/79   01/28/20 134/81   12/17/19 (!) 160/80     Ht Readings from Last 4 Encounters:   10/14/20 5' 5.5" (1.664 m)   10/12/20 5' 5" (1.651 m)   06/16/20 5' 5" (1.651 m)   01/28/20 5' 5.5" (1.664 m)     Wt Readings from Last 4 Encounters:   10/14/20 88.5 kg (195 lb 1.7 oz)   10/12/20 84.4 kg (186 lb)   06/16/20 87.5 kg (192 lb 14.4 oz)   01/28/20 86.6 kg (190 lb 14.7 oz)     Recent Labs   Lab Result Units 10/14/20  0913   Creatinine mg/dL 0.7   ALT U/L 33   AST U/L 22     The goals of rheumatoid arthritis treatment include:  · Relieving pain and suppressing inflammation  · Achieving remission and preventing joint and organ damage  · Increasing joint mobility and strength  · Preventing infection and other complications of treatment  · Reducing long term complications of rheumatoid arthritis  · Improving or maintaining physical function and optimal well-being  · Improving or maintaining quality of life  · Maintaining optimal therapy adherence  · Minimizing and managing side effects    Goals of Therapy Status: Partially achieving    Assessment/Plan  Patient plans to continue therapy without changes      Indication, dosage, appropriateness, effectiveness, safety and convenience of her specialty medication(s) were reviewed today.     Patient Counseling    Counseled the patient on the following: doses and administration discussed, safe handling, storage, and disposal discussed, possible adverse effects and management discussed, possible " drug and prescription drug interactions discussed, possible drug and OTC drug and food interactions discussed, lab monitoring and follow-up discussed, use of contraception discussed, therapeutic rationale discussed, cost of medications and cost implications discussed, adherence and missed doses discussed, pharmacy contact information discussed       Enbrel reassessment completed. Pt is injecting Enbrel every Wednesday. Pt has 1 dose on hand for 10/21, next dose due 10/28. Pt plans to  10/27. Pt is in need of a new sharps container. Pt is not using a calendar to keep track of injection days, she's able to remember when her doses are due. Pt reported no missed doses. Pt is injecting herself and had no issues with the injection. Pt reported no side effects. Pt is keeping medication in the refrigerator and letting it reach room temperature for 30-45 mins mins prior to injecting. Pt is rotating injection sites. Confirmed pt is practicing proper precautions with COVID and wearing a mask outdoors. Reminded pt to hold dose if ill. Pt voiced understanding. Pt has noticed some improvement since starting Enbrel. She reported her morning stiffness improves in a few hours. She reported her feet, hands, and shoulders are her most bothersome joints. Pt reported a little swelling in her feet. Pt rated her pain as 6/10 and overall health as 6/10 (10 being best). Pt does not work. Pt reported no missed plans or visits to ER/urgent care in the last month. Medication list was verified against Epic and pt is not taking MTX, prednisone, capsaicin, or urea - d/c. Pt did not start any new medications or develop any new allergies or health conditions. Therapy appropriate to continue. No upcoming scheduled f/u rheum appt. Labs reviewed. Will f/u with pt in 90 days to re-assess. Pt had no further questions or concerns and was advised to call [facility] should any arise.    QOL (mHAQ) responses:  (1) Dress yourself, including tying shoelaces  and doing buttons?  With a little difficulty  (2) Get in and out of bed? Without difficulty  (3) Lift a full cup or glass to your mouth? Without difficulty  (4) Walk outdoors on flat ground? Without difficulty  (5) Wash and dry your entire body? Without difficulty   (6) Bend down to  clothing from the floor?  With a little difficulty  (7) Turn regular faucets (taps) on and off?  Without difficulty  (8) Get in and out of a car?  Without difficulty    Tasks added this encounter   No tasks added.   Tasks due within next 3 months   10/19/2020 - Refill Call  10/19/2020 - Clinical - Follow Up Assesement (90 day)     Eliza Witt, PharmD  [facility]  [address] [place] [id number]  Phone: [phone]  Fax: [phone]

## 2020-10-20 NOTE — TELEPHONE ENCOUNTER
Spoke with the patient and let her know that no one from this office called her and she was going to call [facility]

## 2020-10-20 NOTE — TELEPHONE ENCOUNTER
----- Message from Flores Mancia MA sent at 10/19/2020  3:50 PM CDT -----  Contact: pt    ----- Message -----  From: Abel Sorenson  Sent: 10/19/2020   3:48 PM CDT  To: [facility] Staff    Please call pt at [phone]    Patient is returning staff call from today     Thank you

## 2020-11-19 ENCOUNTER — SPECIALTY PHARMACY (OUTPATIENT)
Dept: PHARMACY | Facility: CLINIC | Age: 60
End: 2020-11-19

## 2020-11-19 NOTE — TELEPHONE ENCOUNTER
Specialty Pharmacy - Refill Coordination    Specialty Medication Orders Linked to Encounter      Most Recent Value   Medication #1  etanercept (ENBREL SURECLICK) 50 mg/mL (1 mL) (Order#[id number], Rx#[id number])          Refill Questions - Documented Responses      Most Recent Value   Relationship to patient of person spoken to?  Self   HIPAA/medical authority confirmed?  Yes   Any changes in contact preferences or allowed representatives?  No   Has the patient had any insurance changes?  No   Has the patient had any changes to specialty medication, dose, or instructions?  No   Has the patient started taking any new medications, herbals, or supplements?  No   Has the patient been diagnosed with any new medical conditions?  No   Does the patient have any new allergies to medications or foods?  No   Does the patient have any concerns about side effects?  No   Can the patient store medication/sharps container properly (at the correct temperature, away from children/pets, etc.)?  Yes   Can the patient call emergency services (911) in the event of an emergency?  Yes   Does the patient have any concerns or questions about taking or administering this medication as prescribed?  No   How many doses did the patient miss in the past 4 weeks or since the last fill?  0   How many doses does the patient have on hand?  0   How many days does the patient report on hand quantity will last?  0   Does the number of doses/days supply remaining match pharmacy expected amounts?  Yes   Does the patient feel that this medication is effective?  Yes   During the past 4 weeks, has patient missed any activities due to condition or medication?  No   During the past 4 weeks, did patient have any of the following urgent care visits?  None   How will the patient receive the medication?  Pickup   When does the patient need to receive the medication?  11/25/20   Shipping Address  Home   Address in [facility] confirmed and updated if  "neccessary?  Yes   Expected Copay ($)  0   Is the patient able to afford the medication copay?  Yes   Payment Method  zero copay   Days supply of Refill  28   Would patient like to speak to a pharmacist?  No   Do you want to trigger an intervention?  No   Do you want to trigger an additional referral task?  No   Refill activity completed?  Yes   Refill activity plan  Refill scheduled   Shipment/Pickup Date:  11/19/20          Current Outpatient Medications   Medication Sig    albuterol (PROVENTIL) 2.5 mg /3 mL (0.083 %) nebulizer solution     amLODIPine (NORVASC) 10 MG tablet     ascorbic acid, vitamin C, (VITAMIN C) 1000 MG tablet Take 1,000 mg by mouth once daily.    aspirin (ECOTRIN) 81 MG EC tablet Take 81 mg by mouth once daily.    atorvastatin (LIPITOR) 20 MG tablet     BASAGLAR KWIKPEN U-100 INSULIN glargine 100 units/mL (3mL) SubQ pen     BD ULTRA-FINE SHORT PEN NEEDLE 31 gauge x 5/16" Ndle     betamethasone dipropionate (DIPROLENE) 0.05 % cream     cetirizine (ZYRTEC) 10 MG tablet Take 10 mg by mouth once daily.    clobetasol 0.05% (TEMOVATE) 0.05 % Oint Apply to affected area BID    cyclobenzaprine (FLEXERIL) 10 MG tablet     diclofenac sodium (VOLTAREN) 1 % Gel Apply 2 g topically 4 (four) times daily.    echinacea 500 mg Cap Take by mouth.    ELDERBERRY FRUIT ORAL Take by mouth.    enalapril (VASOTEC) 20 MG tablet     etanercept (ENBREL SURECLICK) 50 mg/mL (1 mL) Inject 1 mL (50 mg total) into the skin once a week.    fluticasone propionate (FLONASE) 50 mcg/actuation nasal spray     gabapentin (NEURONTIN) 100 MG capsule     hydroCHLOROthiazide (HYDRODIURIL) 25 MG tablet     insulin lispro 100 unit/mL pen     LEVEMIR FLEXTOUCH U-100 INSULN 100 unit/mL (3 mL) InPn pen     meloxicam (MOBIC) 7.5 MG tablet Take 7.5 mg by mouth once daily.    metFORMIN (GLUMETZA) 1000 MG (MOD) 24 hr tablet Take 1,000 mg by mouth daily with breakfast.    naproxen (NAPROSYN) 500 MG tablet     nebulizer " "and compressor (VIOS AEROSOL DELIVERY SYSTEM) Nika USE TID UTD    NOVOLOG FLEXPEN U-100 INSULIN 100 unit/mL (3 mL) InPn pen     omeprazole (PRILOSEC) 40 MG capsule     PROVENTIL HFA 90 mcg/actuation inhaler     triamcinolone acetonide 0.1% (KENALOG) 0.1 % cream Apply topically.    vit A and D3 in cod liver oiL (COD LIVER OIL) 4,000 unit-400 unit/5 mL Liqd Take by mouth.   Last reviewed on 10/20/2020 10:43 AM by Eliza Witt, PharmD    Review of patient's allergies indicates:   Allergen Reactions    Codeine Other (See Comments)     "it makes me feel crazy"    Prednisone Palpitations     "it makes my heart beat fast. I can take the shot"    Last reviewed on  10/20/2020 10:43 AM by Eliza Witt      Tasks added this encounter   No tasks added.   Tasks due within next 3 months   1/11/2021 - Clinical - Follow Up Assesement (90 day)  11/17/2020 - Refill Call (Auto Added)     Sherlyn Mayfeild  [facility]  [address] [place] [id number]  Phone: [phone]  Fax: [phone]      "

## 2020-11-30 ENCOUNTER — SPECIALTY PHARMACY (OUTPATIENT)
Dept: PHARMACY | Facility: CLINIC | Age: 60
End: 2020-11-30

## 2020-12-16 ENCOUNTER — SPECIALTY PHARMACY (OUTPATIENT)
Dept: PHARMACY | Facility: CLINIC | Age: 60
End: 2020-12-16

## 2020-12-16 NOTE — TELEPHONE ENCOUNTER
Specialty Pharmacy - Refill Coordination    Specialty Medication Orders Linked to Encounter      Most Recent Value   Medication #1  etanercept (ENBREL SURECLICK) 50 mg/mL (1 mL) (Order#[id number], Rx#[id number])          Refill Questions - Documented Responses      Most Recent Value   Relationship to patient of person spoken to?  Self   HIPAA/medical authority confirmed?  Yes   Any changes in contact preferences or allowed representatives?  No   Has the patient had any insurance changes?  No   Has the patient had any changes to specialty medication, dose, or instructions?  No   Has the patient started taking any new medications, herbals, or supplements?  No   Has the patient been diagnosed with any new medical conditions?  No   Does the patient have any new allergies to medications or foods?  No   Does the patient have any concerns about side effects?  No   Can the patient store medication/sharps container properly (at the correct temperature, away from children/pets, etc.)?  Yes   Can the patient call emergency services (911) in the event of an emergency?  Yes   Does the patient have any concerns or questions about taking or administering this medication as prescribed?  No   How many doses did the patient miss in the past 4 weeks or since the last fill?  1 [Patient reported she missed one dose last week (Wednesday and took the injection the following monday)]   Reported reason for missed doses:  Simply forgot   How many doses does the patient have on hand?  0   How many days does the patient report on hand quantity will last?  0   How will the patient receive the medication?  Pickup   When does the patient need to receive the medication?  12/16/20   Expected Copay ($)  0   Is the patient able to afford the medication copay?  Yes   Payment Method  zero copay   Days supply of Refill  28   Would patient like to speak to a pharmacist?  No   Do you want to trigger an intervention?  No   Do you want to trigger an  "additional referral task?  No   Refill activity completed?  Yes   Refill activity plan  Refill scheduled   Shipment/Pickup Date:  12/16/20          Current Outpatient Medications   Medication Sig    albuterol (PROVENTIL) 2.5 mg /3 mL (0.083 %) nebulizer solution     amLODIPine (NORVASC) 10 MG tablet     ascorbic acid, vitamin C, (VITAMIN C) 1000 MG tablet Take 1,000 mg by mouth once daily.    aspirin (ECOTRIN) 81 MG EC tablet Take 81 mg by mouth once daily.    atorvastatin (LIPITOR) 20 MG tablet     BASAGLAR KWIKPEN U-100 INSULIN glargine 100 units/mL (3mL) SubQ pen     BD ULTRA-FINE SHORT PEN NEEDLE 31 gauge x 5/16" Ndle     betamethasone dipropionate (DIPROLENE) 0.05 % cream     cetirizine (ZYRTEC) 10 MG tablet Take 10 mg by mouth once daily.    clobetasol 0.05% (TEMOVATE) 0.05 % Oint Apply to affected area BID    cyclobenzaprine (FLEXERIL) 10 MG tablet     diclofenac sodium (VOLTAREN) 1 % Gel Apply 2 g topically 4 (four) times daily.    echinacea 500 mg Cap Take by mouth.    ELDERBERRY FRUIT ORAL Take by mouth.    enalapril (VASOTEC) 20 MG tablet     etanercept (ENBREL SURECLICK) 50 mg/mL (1 mL) Inject 1 mL (50 mg total) into the skin once a week.    fluticasone propionate (FLONASE) 50 mcg/actuation nasal spray     gabapentin (NEURONTIN) 100 MG capsule     hydroCHLOROthiazide (HYDRODIURIL) 25 MG tablet     insulin lispro 100 unit/mL pen     LEVEMIR FLEXTOUCH U-100 INSULN 100 unit/mL (3 mL) InPn pen     meloxicam (MOBIC) 7.5 MG tablet Take 7.5 mg by mouth once daily.    metFORMIN (GLUMETZA) 1000 MG (MOD) 24 hr tablet Take 1,000 mg by mouth daily with breakfast.    naproxen (NAPROSYN) 500 MG tablet     nebulizer and compressor ([employer] AEROSOL DELIVERY SYSTEM) Nika USE TID UTD    NOVOLOG FLEXPEN U-100 INSULIN 100 unit/mL (3 mL) InPn pen     omeprazole (PRILOSEC) 40 MG capsule     PROVENTIL HFA 90 mcg/actuation inhaler     triamcinolone acetonide 0.1% (KENALOG) 0.1 % cream Apply topically. " "   vit A and D3 in cod liver oiL (COD LIVER OIL) 4,000 unit-400 unit/5 mL Liqd Take by mouth.   Last reviewed on 10/20/2020 10:43 AM by Eliza Witt, PharmD    Review of patient's allergies indicates:   Allergen Reactions    Codeine Other (See Comments)     "it makes me feel crazy"    Prednisone Palpitations     "it makes my heart beat fast. I can take the shot"    Last reviewed on  10/20/2020 10:43 AM by Eliza Witt      Tasks added this encounter   1/6/2021 - Refill Call (Auto Added)  12/17/2020 - Pickup Reminder   Tasks due within next 3 months   1/11/2021 - Clinical - Follow Up Assesement (90 day)     [facility] - Specialty Pharmacy  [address] [place] [id number]  Phone: [phone]  Fax: [phone]      "

## 2021-01-11 ENCOUNTER — SPECIALTY PHARMACY (OUTPATIENT)
Dept: PHARMACY | Facility: CLINIC | Age: 61
End: 2021-01-11

## 2021-02-09 ENCOUNTER — SPECIALTY PHARMACY (OUTPATIENT)
Dept: PHARMACY | Facility: CLINIC | Age: 61
End: 2021-02-09

## 2021-02-17 ENCOUNTER — LAB VISIT (OUTPATIENT)
Dept: LAB | Facility: HOSPITAL | Age: 61
End: 2021-02-17
Attending: INTERNAL MEDICINE
Payer: MEDICARE

## 2021-02-17 ENCOUNTER — OFFICE VISIT (OUTPATIENT)
Dept: RHEUMATOLOGY | Facility: CLINIC | Age: 61
End: 2021-02-17
Payer: MEDICARE

## 2021-02-17 VITALS
BODY MASS INDEX: 32.66 KG/M2 | SYSTOLIC BLOOD PRESSURE: 146 MMHG | DIASTOLIC BLOOD PRESSURE: 82 MMHG | WEIGHT: 199.31 LBS | HEART RATE: 78 BPM

## 2021-02-17 DIAGNOSIS — M79.7 FIBROMYALGIA: ICD-10-CM

## 2021-02-17 DIAGNOSIS — M47.812 SPONDYLOSIS OF CERVICAL REGION WITHOUT MYELOPATHY OR RADICULOPATHY: ICD-10-CM

## 2021-02-17 DIAGNOSIS — M05.741 RHEUMATOID ARTHRITIS INVOLVING BOTH HANDS WITH POSITIVE RHEUMATOID FACTOR: Primary | ICD-10-CM

## 2021-02-17 DIAGNOSIS — M05.742 RHEUMATOID ARTHRITIS INVOLVING BOTH HANDS WITH POSITIVE RHEUMATOID FACTOR: ICD-10-CM

## 2021-02-17 DIAGNOSIS — M05.741 RHEUMATOID ARTHRITIS INVOLVING BOTH HANDS WITH POSITIVE RHEUMATOID FACTOR: ICD-10-CM

## 2021-02-17 DIAGNOSIS — M05.742 RHEUMATOID ARTHRITIS INVOLVING BOTH HANDS WITH POSITIVE RHEUMATOID FACTOR: Primary | ICD-10-CM

## 2021-02-17 PROCEDURE — 36415 COLL VENOUS BLD VENIPUNCTURE: CPT

## 2021-02-17 PROCEDURE — 99214 OFFICE O/P EST MOD 30 MIN: CPT | Mod: S$GLB,,, | Performed by: INTERNAL MEDICINE

## 2021-02-17 PROCEDURE — 99999 PR PBB SHADOW E&M-EST. PATIENT-LVL III: ICD-10-PCS | Mod: PBBFAC,,, | Performed by: INTERNAL MEDICINE

## 2021-02-17 PROCEDURE — 3077F SYST BP >= 140 MM HG: CPT | Mod: CPTII,S$GLB,, | Performed by: INTERNAL MEDICINE

## 2021-02-17 PROCEDURE — 3079F DIAST BP 80-89 MM HG: CPT | Mod: CPTII,S$GLB,, | Performed by: INTERNAL MEDICINE

## 2021-02-17 PROCEDURE — 1125F AMNT PAIN NOTED PAIN PRSNT: CPT | Mod: S$GLB,,, | Performed by: INTERNAL MEDICINE

## 2021-02-17 PROCEDURE — 86480 TB TEST CELL IMMUN MEASURE: CPT

## 2021-02-17 PROCEDURE — 99999 PR PBB SHADOW E&M-EST. PATIENT-LVL III: CPT | Mod: PBBFAC,,, | Performed by: INTERNAL MEDICINE

## 2021-02-17 PROCEDURE — 99214 PR OFFICE/OUTPT VISIT, EST, LEVL IV, 30-39 MIN: ICD-10-PCS | Mod: S$GLB,,, | Performed by: INTERNAL MEDICINE

## 2021-02-17 PROCEDURE — 3079F PR MOST RECENT DIASTOLIC BLOOD PRESSURE 80-89 MM HG: ICD-10-PCS | Mod: CPTII,S$GLB,, | Performed by: INTERNAL MEDICINE

## 2021-02-17 PROCEDURE — 3008F PR BODY MASS INDEX (BMI) DOCUMENTED: ICD-10-PCS | Mod: CPTII,S$GLB,, | Performed by: INTERNAL MEDICINE

## 2021-02-17 PROCEDURE — 1125F PR PAIN SEVERITY QUANTIFIED, PAIN PRESENT: ICD-10-PCS | Mod: S$GLB,,, | Performed by: INTERNAL MEDICINE

## 2021-02-17 PROCEDURE — 3077F PR MOST RECENT SYSTOLIC BLOOD PRESSURE >= 140 MM HG: ICD-10-PCS | Mod: CPTII,S$GLB,, | Performed by: INTERNAL MEDICINE

## 2021-02-17 PROCEDURE — 3008F BODY MASS INDEX DOCD: CPT | Mod: CPTII,S$GLB,, | Performed by: INTERNAL MEDICINE

## 2021-02-17 RX ORDER — ABATACEPT 125 MG/ML
125 INJECTION, SOLUTION SUBCUTANEOUS
Qty: 4 ML | Refills: 11 | Status: SHIPPED | OUTPATIENT
Start: 2021-02-17 | End: 2022-02-18 | Stop reason: SDUPTHER

## 2021-02-17 RX ORDER — DULOXETIN HYDROCHLORIDE 30 MG/1
CAPSULE, DELAYED RELEASE ORAL
Qty: 60 CAPSULE | Refills: 5 | Status: SHIPPED | OUTPATIENT
Start: 2021-02-17 | End: 2021-05-19

## 2021-02-17 RX ORDER — IBUPROFEN 800 MG/1
800 TABLET ORAL 2 TIMES DAILY PRN
Qty: 60 TABLET | Refills: 3 | Status: SHIPPED | OUTPATIENT
Start: 2021-02-17 | End: 2021-03-19

## 2021-02-18 ENCOUNTER — SPECIALTY PHARMACY (OUTPATIENT)
Dept: PHARMACY | Facility: CLINIC | Age: 61
End: 2021-02-18

## 2021-02-18 LAB
GAMMA INTERFERON BACKGROUND BLD IA-ACNC: 0.06 IU/ML
M TB IFN-G CD4+ BCKGRND COR BLD-ACNC: -0.01 IU/ML
MITOGEN IGNF BCKGRD COR BLD-ACNC: 8.34 IU/ML
TB GOLD PLUS: NEGATIVE
TB2 - NIL: 0 IU/ML

## 2021-02-22 ENCOUNTER — SPECIALTY PHARMACY (OUTPATIENT)
Dept: PHARMACY | Facility: CLINIC | Age: 61
End: 2021-02-22

## 2021-02-22 DIAGNOSIS — M05.742 RHEUMATOID ARTHRITIS INVOLVING BOTH HANDS WITH POSITIVE RHEUMATOID FACTOR: Primary | ICD-10-CM

## 2021-02-22 DIAGNOSIS — M05.741 RHEUMATOID ARTHRITIS INVOLVING BOTH HANDS WITH POSITIVE RHEUMATOID FACTOR: Primary | ICD-10-CM

## 2021-03-02 ENCOUNTER — OFFICE VISIT (OUTPATIENT)
Dept: DERMATOLOGY | Facility: CLINIC | Age: 61
End: 2021-03-02
Payer: MEDICARE

## 2021-03-02 DIAGNOSIS — D23.9 DERMATOFIBROMA: ICD-10-CM

## 2021-03-02 DIAGNOSIS — D23.9 SYRINGOMA: ICD-10-CM

## 2021-03-02 DIAGNOSIS — L70.0 COMEDONE: ICD-10-CM

## 2021-03-02 DIAGNOSIS — L30.9 HAND ECZEMA: Primary | ICD-10-CM

## 2021-03-02 PROCEDURE — 99214 OFFICE O/P EST MOD 30 MIN: CPT | Mod: S$GLB,,, | Performed by: DERMATOLOGY

## 2021-03-02 PROCEDURE — 99999 PR PBB SHADOW E&M-EST. PATIENT-LVL V: CPT | Mod: PBBFAC,,, | Performed by: DERMATOLOGY

## 2021-03-02 PROCEDURE — 99999 PR PBB SHADOW E&M-EST. PATIENT-LVL V: ICD-10-PCS | Mod: PBBFAC,,, | Performed by: DERMATOLOGY

## 2021-03-02 PROCEDURE — 99214 PR OFFICE/OUTPT VISIT, EST, LEVL IV, 30-39 MIN: ICD-10-PCS | Mod: S$GLB,,, | Performed by: DERMATOLOGY

## 2021-03-02 PROCEDURE — 1126F AMNT PAIN NOTED NONE PRSNT: CPT | Mod: S$GLB,,, | Performed by: DERMATOLOGY

## 2021-03-02 PROCEDURE — 1126F PR PAIN SEVERITY QUANTIFIED, NO PAIN PRESENT: ICD-10-PCS | Mod: S$GLB,,, | Performed by: DERMATOLOGY

## 2021-03-02 RX ORDER — CLOBETASOL PROPIONATE 0.5 MG/G
OINTMENT TOPICAL 2 TIMES DAILY
Qty: 60 G | Refills: 2 | Status: SHIPPED | OUTPATIENT
Start: 2021-03-02

## 2021-03-03 ENCOUNTER — OFFICE VISIT (OUTPATIENT)
Dept: RHEUMATOLOGY | Facility: CLINIC | Age: 61
End: 2021-03-03
Payer: MEDICARE

## 2021-03-03 VITALS
WEIGHT: 196.19 LBS | SYSTOLIC BLOOD PRESSURE: 151 MMHG | BODY MASS INDEX: 32.15 KG/M2 | HEART RATE: 75 BPM | DIASTOLIC BLOOD PRESSURE: 73 MMHG

## 2021-03-03 DIAGNOSIS — M77.8 RIGHT SHOULDER TENDINITIS: ICD-10-CM

## 2021-03-03 PROCEDURE — 20610 PR DRAIN/INJECT LARGE JOINT/BURSA: ICD-10-PCS | Mod: RT,S$GLB,, | Performed by: INTERNAL MEDICINE

## 2021-03-03 PROCEDURE — 99999 PR PBB SHADOW E&M-EST. PATIENT-LVL V: CPT | Mod: PBBFAC,,, | Performed by: INTERNAL MEDICINE

## 2021-03-03 PROCEDURE — 99999 PR PBB SHADOW E&M-EST. PATIENT-LVL V: ICD-10-PCS | Mod: PBBFAC,,, | Performed by: INTERNAL MEDICINE

## 2021-03-03 PROCEDURE — 20610 DRAIN/INJ JOINT/BURSA W/O US: CPT | Mod: RT,S$GLB,, | Performed by: INTERNAL MEDICINE

## 2021-03-03 RX ORDER — LIDOCAINE HYDROCHLORIDE 20 MG/ML
2 INJECTION, SOLUTION INFILTRATION; PERINEURAL
Status: COMPLETED | OUTPATIENT
Start: 2021-03-03 | End: 2021-03-03

## 2021-03-03 RX ORDER — TRIAMCINOLONE ACETONIDE 40 MG/ML
40 INJECTION, SUSPENSION INTRA-ARTICULAR; INTRAMUSCULAR
Status: COMPLETED | OUTPATIENT
Start: 2021-03-03 | End: 2021-03-03

## 2021-03-03 RX ADMIN — LIDOCAINE HYDROCHLORIDE 2 ML: 20 INJECTION, SOLUTION INFILTRATION; PERINEURAL at 11:03

## 2021-03-03 RX ADMIN — TRIAMCINOLONE ACETONIDE 40 MG: 40 INJECTION, SUSPENSION INTRA-ARTICULAR; INTRAMUSCULAR at 11:03

## 2021-03-19 ENCOUNTER — SPECIALTY PHARMACY (OUTPATIENT)
Dept: PHARMACY | Facility: CLINIC | Age: 61
End: 2021-03-19

## 2021-03-20 ENCOUNTER — IMMUNIZATION (OUTPATIENT)
Dept: PRIMARY CARE CLINIC | Facility: CLINIC | Age: 61
End: 2021-03-20
Payer: MEDICARE

## 2021-03-20 DIAGNOSIS — Z23 NEED FOR VACCINATION: Primary | ICD-10-CM

## 2021-03-20 PROCEDURE — 0001A PR IMMUNIZ ADMIN, SARS-COV-2 COVID-19 VACC, 30MCG/0.3ML, 1ST DOSE: CPT | Mod: CV19,S$GLB,, | Performed by: INTERNAL MEDICINE

## 2021-03-20 PROCEDURE — 91300 PR SARS-COV- 2 COVID-19 VACCINE, NO PRSV, 30MCG/0.3ML, IM: ICD-10-PCS | Mod: S$GLB,,, | Performed by: INTERNAL MEDICINE

## 2021-03-20 PROCEDURE — 0001A PR IMMUNIZ ADMIN, SARS-COV-2 COVID-19 VACC, 30MCG/0.3ML, 1ST DOSE: ICD-10-PCS | Mod: CV19,S$GLB,, | Performed by: INTERNAL MEDICINE

## 2021-03-20 PROCEDURE — 91300 PR SARS-COV- 2 COVID-19 VACCINE, NO PRSV, 30MCG/0.3ML, IM: CPT | Mod: S$GLB,,, | Performed by: INTERNAL MEDICINE

## 2021-03-20 RX ADMIN — Medication 0.3 ML: at 09:03

## 2021-04-05 ENCOUNTER — CLINICAL SUPPORT (OUTPATIENT)
Dept: REHABILITATION | Facility: HOSPITAL | Age: 61
End: 2021-04-05
Attending: INTERNAL MEDICINE
Payer: MEDICARE

## 2021-04-05 DIAGNOSIS — M79.18 MYOFASCIAL PAIN: ICD-10-CM

## 2021-04-05 DIAGNOSIS — M25.611 DECREASED RANGE OF MOTION OF RIGHT SHOULDER: ICD-10-CM

## 2021-04-05 DIAGNOSIS — M25.511 CHRONIC RIGHT SHOULDER PAIN: ICD-10-CM

## 2021-04-05 DIAGNOSIS — M77.8 RIGHT SHOULDER TENDINITIS: ICD-10-CM

## 2021-04-05 DIAGNOSIS — M25.611 SHOULDER STIFFNESS, RIGHT: ICD-10-CM

## 2021-04-05 DIAGNOSIS — G89.29 CHRONIC RIGHT SHOULDER PAIN: ICD-10-CM

## 2021-04-05 PROBLEM — M25.519 CHRONIC SHOULDER PAIN: Status: ACTIVE | Noted: 2021-04-05

## 2021-04-05 PROCEDURE — 97110 THERAPEUTIC EXERCISES: CPT | Mod: PN

## 2021-04-05 PROCEDURE — 97162 PT EVAL MOD COMPLEX 30 MIN: CPT | Mod: PN

## 2021-04-11 ENCOUNTER — IMMUNIZATION (OUTPATIENT)
Dept: PRIMARY CARE CLINIC | Facility: CLINIC | Age: 61
End: 2021-04-11
Payer: MEDICARE

## 2021-04-11 DIAGNOSIS — Z23 NEED FOR VACCINATION: Primary | ICD-10-CM

## 2021-04-11 PROCEDURE — 0002A PR IMMUNIZ ADMIN, SARS-COV-2 COVID-19 VACC, 30MCG/0.3ML, 2ND DOSE: CPT | Mod: CV19,S$GLB,, | Performed by: INTERNAL MEDICINE

## 2021-04-11 PROCEDURE — 91300 PR SARS-COV- 2 COVID-19 VACCINE, NO PRSV, 30MCG/0.3ML, IM: CPT | Mod: S$GLB,,, | Performed by: INTERNAL MEDICINE

## 2021-04-11 PROCEDURE — 91300 PR SARS-COV- 2 COVID-19 VACCINE, NO PRSV, 30MCG/0.3ML, IM: ICD-10-PCS | Mod: S$GLB,,, | Performed by: INTERNAL MEDICINE

## 2021-04-11 PROCEDURE — 0002A PR IMMUNIZ ADMIN, SARS-COV-2 COVID-19 VACC, 30MCG/0.3ML, 2ND DOSE: ICD-10-PCS | Mod: CV19,S$GLB,, | Performed by: INTERNAL MEDICINE

## 2021-04-11 RX ADMIN — Medication 0.3 ML: at 10:04

## 2021-04-21 ENCOUNTER — SPECIALTY PHARMACY (OUTPATIENT)
Dept: PHARMACY | Facility: CLINIC | Age: 61
End: 2021-04-21

## 2021-04-23 ENCOUNTER — CLINICAL SUPPORT (OUTPATIENT)
Dept: REHABILITATION | Facility: HOSPITAL | Age: 61
End: 2021-04-23
Attending: INTERNAL MEDICINE
Payer: MEDICARE

## 2021-04-23 DIAGNOSIS — M25.611 DECREASED RANGE OF MOTION OF RIGHT SHOULDER: ICD-10-CM

## 2021-04-23 DIAGNOSIS — M79.18 MYOFASCIAL PAIN: ICD-10-CM

## 2021-04-23 DIAGNOSIS — M25.611 SHOULDER STIFFNESS, RIGHT: ICD-10-CM

## 2021-04-23 DIAGNOSIS — M25.511 CHRONIC RIGHT SHOULDER PAIN: ICD-10-CM

## 2021-04-23 DIAGNOSIS — G89.29 CHRONIC RIGHT SHOULDER PAIN: ICD-10-CM

## 2021-04-23 PROCEDURE — 97110 THERAPEUTIC EXERCISES: CPT | Mod: PN

## 2021-04-26 ENCOUNTER — CLINICAL SUPPORT (OUTPATIENT)
Dept: REHABILITATION | Facility: HOSPITAL | Age: 61
End: 2021-04-26
Attending: INTERNAL MEDICINE
Payer: MEDICARE

## 2021-04-26 DIAGNOSIS — M25.511 CHRONIC RIGHT SHOULDER PAIN: ICD-10-CM

## 2021-04-26 DIAGNOSIS — G89.29 CHRONIC RIGHT SHOULDER PAIN: ICD-10-CM

## 2021-04-26 DIAGNOSIS — M79.18 MYOFASCIAL PAIN: ICD-10-CM

## 2021-04-26 DIAGNOSIS — M25.611 DECREASED RANGE OF MOTION OF RIGHT SHOULDER: ICD-10-CM

## 2021-04-26 DIAGNOSIS — M25.611 SHOULDER STIFFNESS, RIGHT: ICD-10-CM

## 2021-04-26 PROCEDURE — 97110 THERAPEUTIC EXERCISES: CPT | Mod: PN

## 2021-05-07 ENCOUNTER — DOCUMENTATION ONLY (OUTPATIENT)
Dept: REHABILITATION | Facility: HOSPITAL | Age: 61
End: 2021-05-07

## 2021-05-12 ENCOUNTER — CLINICAL SUPPORT (OUTPATIENT)
Dept: REHABILITATION | Facility: HOSPITAL | Age: 61
End: 2021-05-12
Attending: INTERNAL MEDICINE
Payer: MEDICARE

## 2021-05-12 DIAGNOSIS — M79.18 MYOFASCIAL PAIN: ICD-10-CM

## 2021-05-12 DIAGNOSIS — M25.511 CHRONIC RIGHT SHOULDER PAIN: ICD-10-CM

## 2021-05-12 DIAGNOSIS — G89.29 CHRONIC RIGHT SHOULDER PAIN: ICD-10-CM

## 2021-05-12 DIAGNOSIS — M25.611 SHOULDER STIFFNESS, RIGHT: ICD-10-CM

## 2021-05-12 DIAGNOSIS — M25.611 DECREASED RANGE OF MOTION OF RIGHT SHOULDER: ICD-10-CM

## 2021-05-12 PROCEDURE — 97140 MANUAL THERAPY 1/> REGIONS: CPT | Mod: PN

## 2021-05-12 PROCEDURE — 97110 THERAPEUTIC EXERCISES: CPT | Mod: PN

## 2021-05-15 ENCOUNTER — TELEPHONE (OUTPATIENT)
Dept: PHARMACY | Facility: CLINIC | Age: 61
End: 2021-05-15

## 2021-05-17 ENCOUNTER — DOCUMENTATION ONLY (OUTPATIENT)
Dept: REHABILITATION | Facility: HOSPITAL | Age: 61
End: 2021-05-17

## 2021-05-19 ENCOUNTER — LAB VISIT (OUTPATIENT)
Dept: LAB | Facility: HOSPITAL | Age: 61
End: 2021-05-19
Attending: INTERNAL MEDICINE
Payer: MEDICARE

## 2021-05-19 ENCOUNTER — OFFICE VISIT (OUTPATIENT)
Dept: RHEUMATOLOGY | Facility: CLINIC | Age: 61
End: 2021-05-19
Payer: MEDICARE

## 2021-05-19 VITALS
HEART RATE: 94 BPM | DIASTOLIC BLOOD PRESSURE: 77 MMHG | SYSTOLIC BLOOD PRESSURE: 146 MMHG | BODY MASS INDEX: 32.15 KG/M2 | WEIGHT: 196.19 LBS

## 2021-05-19 DIAGNOSIS — M05.742 RHEUMATOID ARTHRITIS INVOLVING BOTH HANDS WITH POSITIVE RHEUMATOID FACTOR: ICD-10-CM

## 2021-05-19 DIAGNOSIS — M05.741 RHEUMATOID ARTHRITIS INVOLVING BOTH HANDS WITH POSITIVE RHEUMATOID FACTOR: ICD-10-CM

## 2021-05-19 DIAGNOSIS — M25.511 ARTHRALGIA OF RIGHT SHOULDER REGION: Primary | ICD-10-CM

## 2021-05-19 DIAGNOSIS — M25.511 ARTHRALGIA OF RIGHT SHOULDER REGION: ICD-10-CM

## 2021-05-19 LAB
ALBUMIN SERPL BCP-MCNC: 3.7 G/DL (ref 3.5–5.2)
ALP SERPL-CCNC: 140 U/L (ref 55–135)
ALT SERPL W/O P-5'-P-CCNC: 22 U/L (ref 10–44)
ANION GAP SERPL CALC-SCNC: 11 MMOL/L (ref 8–16)
AST SERPL-CCNC: 17 U/L (ref 10–40)
BASOPHILS # BLD AUTO: 0.04 K/UL (ref 0–0.2)
BASOPHILS NFR BLD: 0.6 % (ref 0–1.9)
BILIRUB SERPL-MCNC: 0.3 MG/DL (ref 0.1–1)
BUN SERPL-MCNC: 13 MG/DL (ref 6–20)
CALCIUM SERPL-MCNC: 9.7 MG/DL (ref 8.7–10.5)
CHLORIDE SERPL-SCNC: 103 MMOL/L (ref 95–110)
CO2 SERPL-SCNC: 28 MMOL/L (ref 23–29)
CREAT SERPL-MCNC: 0.7 MG/DL (ref 0.5–1.4)
CRP SERPL-MCNC: 1.8 MG/L (ref 0–8.2)
DIFFERENTIAL METHOD: ABNORMAL
EOSINOPHIL # BLD AUTO: 0.2 K/UL (ref 0–0.5)
EOSINOPHIL NFR BLD: 2.9 % (ref 0–8)
ERYTHROCYTE [DISTWIDTH] IN BLOOD BY AUTOMATED COUNT: 15.7 % (ref 11.5–14.5)
ERYTHROCYTE [SEDIMENTATION RATE] IN BLOOD BY WESTERGREN METHOD: 28 MM/HR (ref 0–36)
EST. GFR  (AFRICAN AMERICAN): >60 ML/MIN/1.73 M^2
EST. GFR  (NON AFRICAN AMERICAN): >60 ML/MIN/1.73 M^2
GLUCOSE SERPL-MCNC: 111 MG/DL (ref 70–110)
HCT VFR BLD AUTO: 37.5 % (ref 37–48.5)
HGB BLD-MCNC: 11.4 G/DL (ref 12–16)
IMM GRANULOCYTES # BLD AUTO: 0.02 K/UL (ref 0–0.04)
IMM GRANULOCYTES NFR BLD AUTO: 0.3 % (ref 0–0.5)
LYMPHOCYTES # BLD AUTO: 2.2 K/UL (ref 1–4.8)
LYMPHOCYTES NFR BLD: 33.4 % (ref 18–48)
MCH RBC QN AUTO: 23.2 PG (ref 27–31)
MCHC RBC AUTO-ENTMCNC: 30.4 G/DL (ref 32–36)
MCV RBC AUTO: 76 FL (ref 82–98)
MONOCYTES # BLD AUTO: 0.6 K/UL (ref 0.3–1)
MONOCYTES NFR BLD: 9.3 % (ref 4–15)
NEUTROPHILS # BLD AUTO: 3.5 K/UL (ref 1.8–7.7)
NEUTROPHILS NFR BLD: 53.5 % (ref 38–73)
NRBC BLD-RTO: 0 /100 WBC
PLATELET # BLD AUTO: 381 K/UL (ref 150–450)
PMV BLD AUTO: 9 FL (ref 9.2–12.9)
POTASSIUM SERPL-SCNC: 3.9 MMOL/L (ref 3.5–5.1)
PROT SERPL-MCNC: 7.7 G/DL (ref 6–8.4)
RBC # BLD AUTO: 4.92 M/UL (ref 4–5.4)
SODIUM SERPL-SCNC: 142 MMOL/L (ref 136–145)
WBC # BLD AUTO: 6.56 K/UL (ref 3.9–12.7)

## 2021-05-19 PROCEDURE — 99214 PR OFFICE/OUTPT VISIT, EST, LEVL IV, 30-39 MIN: ICD-10-PCS | Mod: S$GLB,,, | Performed by: INTERNAL MEDICINE

## 2021-05-19 PROCEDURE — 36415 COLL VENOUS BLD VENIPUNCTURE: CPT | Performed by: INTERNAL MEDICINE

## 2021-05-19 PROCEDURE — 85025 COMPLETE CBC W/AUTO DIFF WBC: CPT | Performed by: INTERNAL MEDICINE

## 2021-05-19 PROCEDURE — 86140 C-REACTIVE PROTEIN: CPT | Performed by: INTERNAL MEDICINE

## 2021-05-19 PROCEDURE — 99999 PR PBB SHADOW E&M-EST. PATIENT-LVL IV: ICD-10-PCS | Mod: PBBFAC,,, | Performed by: INTERNAL MEDICINE

## 2021-05-19 PROCEDURE — 3008F BODY MASS INDEX DOCD: CPT | Mod: CPTII,S$GLB,, | Performed by: INTERNAL MEDICINE

## 2021-05-19 PROCEDURE — 99214 OFFICE O/P EST MOD 30 MIN: CPT | Mod: S$GLB,,, | Performed by: INTERNAL MEDICINE

## 2021-05-19 PROCEDURE — 80053 COMPREHEN METABOLIC PANEL: CPT | Performed by: INTERNAL MEDICINE

## 2021-05-19 PROCEDURE — 3008F PR BODY MASS INDEX (BMI) DOCUMENTED: ICD-10-PCS | Mod: CPTII,S$GLB,, | Performed by: INTERNAL MEDICINE

## 2021-05-19 PROCEDURE — 85652 RBC SED RATE AUTOMATED: CPT | Performed by: INTERNAL MEDICINE

## 2021-05-19 PROCEDURE — 99999 PR PBB SHADOW E&M-EST. PATIENT-LVL IV: CPT | Mod: PBBFAC,,, | Performed by: INTERNAL MEDICINE

## 2021-05-19 RX ORDER — DULOXETIN HYDROCHLORIDE 30 MG/1
CAPSULE, DELAYED RELEASE ORAL
Qty: 60 CAPSULE | Refills: 5 | Status: SHIPPED | OUTPATIENT
Start: 2021-05-19 | End: 2021-05-19

## 2021-05-19 RX ORDER — DULOXETIN HYDROCHLORIDE 30 MG/1
CAPSULE, DELAYED RELEASE ORAL
Qty: 60 CAPSULE | Refills: 5 | Status: SHIPPED | OUTPATIENT
Start: 2021-05-19 | End: 2021-10-05

## 2021-05-21 ENCOUNTER — TELEPHONE (OUTPATIENT)
Dept: RHEUMATOLOGY | Facility: CLINIC | Age: 61
End: 2021-05-21

## 2021-05-21 ENCOUNTER — CLINICAL SUPPORT (OUTPATIENT)
Dept: REHABILITATION | Facility: HOSPITAL | Age: 61
End: 2021-05-21
Attending: INTERNAL MEDICINE
Payer: MEDICARE

## 2021-05-21 DIAGNOSIS — M25.511 CHRONIC RIGHT SHOULDER PAIN: ICD-10-CM

## 2021-05-21 DIAGNOSIS — G89.29 CHRONIC RIGHT SHOULDER PAIN: ICD-10-CM

## 2021-05-21 DIAGNOSIS — M79.18 MYOFASCIAL PAIN: ICD-10-CM

## 2021-05-21 DIAGNOSIS — M25.611 DECREASED RANGE OF MOTION OF RIGHT SHOULDER: ICD-10-CM

## 2021-05-21 DIAGNOSIS — M25.611 SHOULDER STIFFNESS, RIGHT: ICD-10-CM

## 2021-05-21 PROCEDURE — 97140 MANUAL THERAPY 1/> REGIONS: CPT | Mod: PN

## 2021-05-21 PROCEDURE — 97110 THERAPEUTIC EXERCISES: CPT | Mod: PN

## 2021-05-27 ENCOUNTER — SPECIALTY PHARMACY (OUTPATIENT)
Dept: PHARMACY | Facility: CLINIC | Age: 61
End: 2021-05-27

## 2021-05-27 ENCOUNTER — CLINICAL SUPPORT (OUTPATIENT)
Dept: REHABILITATION | Facility: HOSPITAL | Age: 61
End: 2021-05-27
Attending: INTERNAL MEDICINE
Payer: MEDICARE

## 2021-05-27 DIAGNOSIS — M79.18 MYOFASCIAL PAIN: ICD-10-CM

## 2021-05-27 DIAGNOSIS — M25.611 DECREASED RANGE OF MOTION OF RIGHT SHOULDER: ICD-10-CM

## 2021-05-27 DIAGNOSIS — M25.611 SHOULDER STIFFNESS, RIGHT: ICD-10-CM

## 2021-05-27 DIAGNOSIS — G89.29 CHRONIC RIGHT SHOULDER PAIN: ICD-10-CM

## 2021-05-27 DIAGNOSIS — M25.511 CHRONIC RIGHT SHOULDER PAIN: ICD-10-CM

## 2021-05-27 PROCEDURE — 97110 THERAPEUTIC EXERCISES: CPT | Mod: PN,CQ

## 2021-05-28 ENCOUNTER — HOSPITAL ENCOUNTER (OUTPATIENT)
Dept: RADIOLOGY | Facility: HOSPITAL | Age: 61
Discharge: HOME | End: 2021-05-28
Attending: INTERNAL MEDICINE
Payer: MEDICARE

## 2021-05-28 DIAGNOSIS — M25.511 ARTHRALGIA OF RIGHT SHOULDER REGION: ICD-10-CM

## 2021-05-28 PROCEDURE — 73223 MRI JOINT UPR EXTR W/O&W/DYE: CPT | Mod: 26,RT,, | Performed by: RADIOLOGY

## 2021-05-28 PROCEDURE — 25500020 PHARM REV CODE 255: Performed by: INTERNAL MEDICINE

## 2021-05-28 PROCEDURE — A9585 GADOBUTROL INJECTION: HCPCS | Performed by: INTERNAL MEDICINE

## 2021-05-28 PROCEDURE — 73223 MRI SHOULDER W WO CONTRAST RIGHT: ICD-10-PCS | Mod: 26,RT,, | Performed by: RADIOLOGY

## 2021-05-28 PROCEDURE — 73223 MRI JOINT UPR EXTR W/O&W/DYE: CPT | Mod: TC,RT

## 2021-05-28 RX ORDER — GADOBUTROL 604.72 MG/ML
9 INJECTION INTRAVENOUS
Status: COMPLETED | OUTPATIENT
Start: 2021-05-28 | End: 2021-05-28

## 2021-05-28 RX ADMIN — GADOBUTROL 9 ML: 604.72 INJECTION INTRAVENOUS at 07:05

## 2021-06-01 DIAGNOSIS — M12.811 ROTATOR CUFF ARTHROPATHY OF RIGHT SHOULDER: Primary | ICD-10-CM

## 2021-06-08 ENCOUNTER — CLINICAL SUPPORT (OUTPATIENT)
Dept: REHABILITATION | Facility: HOSPITAL | Age: 61
End: 2021-06-08
Attending: INTERNAL MEDICINE
Payer: MEDICARE

## 2021-06-08 DIAGNOSIS — M25.611 SHOULDER STIFFNESS, RIGHT: ICD-10-CM

## 2021-06-08 DIAGNOSIS — M79.18 MYOFASCIAL PAIN: ICD-10-CM

## 2021-06-08 DIAGNOSIS — G89.29 CHRONIC RIGHT SHOULDER PAIN: ICD-10-CM

## 2021-06-08 DIAGNOSIS — M25.611 DECREASED RANGE OF MOTION OF RIGHT SHOULDER: ICD-10-CM

## 2021-06-08 DIAGNOSIS — M25.511 CHRONIC RIGHT SHOULDER PAIN: ICD-10-CM

## 2021-06-08 PROCEDURE — 97110 THERAPEUTIC EXERCISES: CPT | Mod: PN

## 2021-06-08 PROCEDURE — 97140 MANUAL THERAPY 1/> REGIONS: CPT | Mod: PN

## 2021-06-22 ENCOUNTER — APPOINTMENT (OUTPATIENT)
Dept: RADIOLOGY | Facility: HOSPITAL | Age: 61
End: 2021-06-22
Attending: PHYSICIAN ASSISTANT
Payer: MEDICARE

## 2021-06-22 ENCOUNTER — OFFICE VISIT (OUTPATIENT)
Dept: ORTHOPEDICS | Facility: CLINIC | Age: 61
End: 2021-06-22
Payer: MEDICARE

## 2021-06-22 VITALS
SYSTOLIC BLOOD PRESSURE: 132 MMHG | WEIGHT: 196.19 LBS | RESPIRATION RATE: 18 BRPM | DIASTOLIC BLOOD PRESSURE: 70 MMHG | TEMPERATURE: 99 F | HEART RATE: 75 BPM | BODY MASS INDEX: 32.69 KG/M2 | OXYGEN SATURATION: 97 % | HEIGHT: 65 IN

## 2021-06-22 DIAGNOSIS — G89.29 CHRONIC RIGHT SHOULDER PAIN: Primary | ICD-10-CM

## 2021-06-22 DIAGNOSIS — S46.011A TRAUMATIC COMPLETE TEAR OF RIGHT ROTATOR CUFF, INITIAL ENCOUNTER: ICD-10-CM

## 2021-06-22 DIAGNOSIS — M25.511 CHRONIC RIGHT SHOULDER PAIN: Primary | ICD-10-CM

## 2021-06-22 DIAGNOSIS — M25.511 CHRONIC RIGHT SHOULDER PAIN: ICD-10-CM

## 2021-06-22 DIAGNOSIS — G89.29 CHRONIC RIGHT SHOULDER PAIN: ICD-10-CM

## 2021-06-22 PROCEDURE — 3008F PR BODY MASS INDEX (BMI) DOCUMENTED: ICD-10-PCS | Mod: CPTII,S$GLB,, | Performed by: PHYSICIAN ASSISTANT

## 2021-06-22 PROCEDURE — 99203 PR OFFICE/OUTPT VISIT, NEW, LEVL III, 30-44 MIN: ICD-10-PCS | Mod: S$GLB,,, | Performed by: PHYSICIAN ASSISTANT

## 2021-06-22 PROCEDURE — 3008F BODY MASS INDEX DOCD: CPT | Mod: CPTII,S$GLB,, | Performed by: PHYSICIAN ASSISTANT

## 2021-06-22 PROCEDURE — 73030 XR SHOULDER TRAUMA 3 VIEW RIGHT: ICD-10-PCS | Mod: 26,RT,, | Performed by: RADIOLOGY

## 2021-06-22 PROCEDURE — 73030 X-RAY EXAM OF SHOULDER: CPT | Mod: 26,RT,, | Performed by: RADIOLOGY

## 2021-06-22 PROCEDURE — 1125F PR PAIN SEVERITY QUANTIFIED, PAIN PRESENT: ICD-10-PCS | Mod: S$GLB,,, | Performed by: PHYSICIAN ASSISTANT

## 2021-06-22 PROCEDURE — 99999 PR PBB SHADOW E&M-EST. PATIENT-LVL V: ICD-10-PCS | Mod: PBBFAC,,, | Performed by: PHYSICIAN ASSISTANT

## 2021-06-22 PROCEDURE — 73030 X-RAY EXAM OF SHOULDER: CPT | Mod: TC,FY,PN,RT

## 2021-06-22 PROCEDURE — 99203 OFFICE O/P NEW LOW 30 MIN: CPT | Mod: S$GLB,,, | Performed by: PHYSICIAN ASSISTANT

## 2021-06-22 PROCEDURE — 1125F AMNT PAIN NOTED PAIN PRSNT: CPT | Mod: S$GLB,,, | Performed by: PHYSICIAN ASSISTANT

## 2021-06-22 PROCEDURE — 99999 PR PBB SHADOW E&M-EST. PATIENT-LVL V: CPT | Mod: PBBFAC,,, | Performed by: PHYSICIAN ASSISTANT

## 2021-06-24 ENCOUNTER — SPECIALTY PHARMACY (OUTPATIENT)
Dept: PHARMACY | Facility: CLINIC | Age: 61
End: 2021-06-24

## 2021-06-29 ENCOUNTER — CLINICAL SUPPORT (OUTPATIENT)
Dept: REHABILITATION | Facility: HOSPITAL | Age: 61
End: 2021-06-29
Attending: INTERNAL MEDICINE
Payer: MEDICARE

## 2021-06-29 DIAGNOSIS — M25.511 CHRONIC RIGHT SHOULDER PAIN: ICD-10-CM

## 2021-06-29 DIAGNOSIS — M79.18 MYOFASCIAL PAIN: ICD-10-CM

## 2021-06-29 DIAGNOSIS — G89.29 CHRONIC RIGHT SHOULDER PAIN: ICD-10-CM

## 2021-06-29 DIAGNOSIS — M25.611 DECREASED RANGE OF MOTION OF RIGHT SHOULDER: ICD-10-CM

## 2021-06-29 DIAGNOSIS — M25.611 SHOULDER STIFFNESS, RIGHT: ICD-10-CM

## 2021-06-29 PROCEDURE — 97110 THERAPEUTIC EXERCISES: CPT | Mod: PN,CQ

## 2021-07-02 ENCOUNTER — DOCUMENTATION ONLY (OUTPATIENT)
Dept: REHABILITATION | Facility: HOSPITAL | Age: 61
End: 2021-07-02

## 2021-07-09 ENCOUNTER — CLINICAL SUPPORT (OUTPATIENT)
Dept: REHABILITATION | Facility: HOSPITAL | Age: 61
End: 2021-07-09
Attending: INTERNAL MEDICINE
Payer: MEDICARE

## 2021-07-09 ENCOUNTER — SPECIALTY PHARMACY (OUTPATIENT)
Dept: PHARMACY | Facility: CLINIC | Age: 61
End: 2021-07-09

## 2021-07-09 DIAGNOSIS — M25.611 SHOULDER STIFFNESS, RIGHT: ICD-10-CM

## 2021-07-09 DIAGNOSIS — M25.511 CHRONIC RIGHT SHOULDER PAIN: ICD-10-CM

## 2021-07-09 DIAGNOSIS — M25.611 DECREASED RANGE OF MOTION OF RIGHT SHOULDER: ICD-10-CM

## 2021-07-09 DIAGNOSIS — G89.29 CHRONIC RIGHT SHOULDER PAIN: ICD-10-CM

## 2021-07-09 DIAGNOSIS — M79.18 MYOFASCIAL PAIN: ICD-10-CM

## 2021-07-09 PROCEDURE — 97110 THERAPEUTIC EXERCISES: CPT | Mod: PN

## 2021-07-13 ENCOUNTER — CLINICAL SUPPORT (OUTPATIENT)
Dept: REHABILITATION | Facility: HOSPITAL | Age: 61
End: 2021-07-13
Attending: INTERNAL MEDICINE
Payer: MEDICARE

## 2021-07-13 DIAGNOSIS — M25.611 SHOULDER STIFFNESS, RIGHT: ICD-10-CM

## 2021-07-13 DIAGNOSIS — M79.18 MYOFASCIAL PAIN: ICD-10-CM

## 2021-07-13 DIAGNOSIS — M25.511 CHRONIC RIGHT SHOULDER PAIN: ICD-10-CM

## 2021-07-13 DIAGNOSIS — G89.29 CHRONIC RIGHT SHOULDER PAIN: ICD-10-CM

## 2021-07-13 DIAGNOSIS — M25.611 DECREASED RANGE OF MOTION OF RIGHT SHOULDER: ICD-10-CM

## 2021-07-13 PROCEDURE — 97110 THERAPEUTIC EXERCISES: CPT | Mod: PN,CQ

## 2021-07-16 ENCOUNTER — CLINICAL SUPPORT (OUTPATIENT)
Dept: REHABILITATION | Facility: HOSPITAL | Age: 61
End: 2021-07-16
Attending: INTERNAL MEDICINE
Payer: MEDICARE

## 2021-07-16 DIAGNOSIS — M79.18 MYOFASCIAL PAIN: ICD-10-CM

## 2021-07-16 DIAGNOSIS — M25.611 SHOULDER STIFFNESS, RIGHT: ICD-10-CM

## 2021-07-16 DIAGNOSIS — G89.29 CHRONIC RIGHT SHOULDER PAIN: ICD-10-CM

## 2021-07-16 DIAGNOSIS — M25.511 CHRONIC RIGHT SHOULDER PAIN: ICD-10-CM

## 2021-07-16 DIAGNOSIS — M25.611 DECREASED RANGE OF MOTION OF RIGHT SHOULDER: ICD-10-CM

## 2021-07-16 PROCEDURE — 97110 THERAPEUTIC EXERCISES: CPT | Mod: PN

## 2021-07-20 ENCOUNTER — CLINICAL SUPPORT (OUTPATIENT)
Dept: REHABILITATION | Facility: HOSPITAL | Age: 61
End: 2021-07-20
Attending: INTERNAL MEDICINE
Payer: MEDICARE

## 2021-07-20 ENCOUNTER — OFFICE VISIT (OUTPATIENT)
Dept: ORTHOPEDICS | Facility: CLINIC | Age: 61
End: 2021-07-20
Payer: MEDICARE

## 2021-07-20 VITALS
OXYGEN SATURATION: 98 % | HEART RATE: 67 BPM | HEIGHT: 65 IN | WEIGHT: 193.13 LBS | RESPIRATION RATE: 18 BRPM | DIASTOLIC BLOOD PRESSURE: 60 MMHG | SYSTOLIC BLOOD PRESSURE: 128 MMHG | BODY MASS INDEX: 32.18 KG/M2

## 2021-07-20 DIAGNOSIS — G89.29 CHRONIC RIGHT SHOULDER PAIN: ICD-10-CM

## 2021-07-20 DIAGNOSIS — M25.511 CHRONIC RIGHT SHOULDER PAIN: Primary | ICD-10-CM

## 2021-07-20 DIAGNOSIS — M79.18 MYOFASCIAL PAIN: ICD-10-CM

## 2021-07-20 DIAGNOSIS — M25.611 SHOULDER STIFFNESS, RIGHT: ICD-10-CM

## 2021-07-20 DIAGNOSIS — M25.611 DECREASED RANGE OF MOTION OF RIGHT SHOULDER: ICD-10-CM

## 2021-07-20 DIAGNOSIS — G89.29 CHRONIC RIGHT SHOULDER PAIN: Primary | ICD-10-CM

## 2021-07-20 DIAGNOSIS — M25.511 CHRONIC RIGHT SHOULDER PAIN: ICD-10-CM

## 2021-07-20 PROCEDURE — 3008F BODY MASS INDEX DOCD: CPT | Mod: CPTII,S$GLB,, | Performed by: ORTHOPAEDIC SURGERY

## 2021-07-20 PROCEDURE — 99999 PR PBB SHADOW E&M-EST. PATIENT-LVL V: ICD-10-PCS | Mod: PBBFAC,,, | Performed by: ORTHOPAEDIC SURGERY

## 2021-07-20 PROCEDURE — 1125F AMNT PAIN NOTED PAIN PRSNT: CPT | Mod: CPTII,S$GLB,, | Performed by: ORTHOPAEDIC SURGERY

## 2021-07-20 PROCEDURE — 99213 PR OFFICE/OUTPT VISIT, EST, LEVL III, 20-29 MIN: ICD-10-PCS | Mod: S$GLB,,, | Performed by: ORTHOPAEDIC SURGERY

## 2021-07-20 PROCEDURE — 1125F PR PAIN SEVERITY QUANTIFIED, PAIN PRESENT: ICD-10-PCS | Mod: CPTII,S$GLB,, | Performed by: ORTHOPAEDIC SURGERY

## 2021-07-20 PROCEDURE — 97110 THERAPEUTIC EXERCISES: CPT | Mod: PN,CQ

## 2021-07-20 PROCEDURE — 99213 OFFICE O/P EST LOW 20 MIN: CPT | Mod: S$GLB,,, | Performed by: ORTHOPAEDIC SURGERY

## 2021-07-20 PROCEDURE — 99999 PR PBB SHADOW E&M-EST. PATIENT-LVL V: CPT | Mod: PBBFAC,,, | Performed by: ORTHOPAEDIC SURGERY

## 2021-07-20 PROCEDURE — 3008F PR BODY MASS INDEX (BMI) DOCUMENTED: ICD-10-PCS | Mod: CPTII,S$GLB,, | Performed by: ORTHOPAEDIC SURGERY

## 2021-07-28 ENCOUNTER — PATIENT MESSAGE (OUTPATIENT)
Dept: PHARMACY | Facility: CLINIC | Age: 61
End: 2021-07-28

## 2021-07-30 ENCOUNTER — OFFICE VISIT (OUTPATIENT)
Dept: ORTHOPEDICS | Facility: CLINIC | Age: 61
End: 2021-07-30
Payer: MEDICARE

## 2021-07-30 ENCOUNTER — CLINICAL SUPPORT (OUTPATIENT)
Dept: REHABILITATION | Facility: HOSPITAL | Age: 61
End: 2021-07-30
Attending: INTERNAL MEDICINE
Payer: MEDICARE

## 2021-07-30 VITALS
HEART RATE: 70 BPM | TEMPERATURE: 98 F | SYSTOLIC BLOOD PRESSURE: 148 MMHG | DIASTOLIC BLOOD PRESSURE: 68 MMHG | WEIGHT: 197.31 LBS | HEIGHT: 65 IN | BODY MASS INDEX: 32.87 KG/M2 | RESPIRATION RATE: 18 BRPM | OXYGEN SATURATION: 99 %

## 2021-07-30 DIAGNOSIS — M25.511 CHRONIC RIGHT SHOULDER PAIN: ICD-10-CM

## 2021-07-30 DIAGNOSIS — M25.562 ACUTE PAIN OF LEFT KNEE: ICD-10-CM

## 2021-07-30 DIAGNOSIS — M79.642 LEFT HAND PAIN: ICD-10-CM

## 2021-07-30 DIAGNOSIS — M20.42 HAMMER TOES OF BOTH FEET: ICD-10-CM

## 2021-07-30 DIAGNOSIS — M25.611 DECREASED RANGE OF MOTION OF RIGHT SHOULDER: ICD-10-CM

## 2021-07-30 DIAGNOSIS — M25.611 SHOULDER STIFFNESS, RIGHT: ICD-10-CM

## 2021-07-30 DIAGNOSIS — M20.41 HAMMER TOES OF BOTH FEET: ICD-10-CM

## 2021-07-30 DIAGNOSIS — R29.6 FALLS FREQUENTLY: Primary | ICD-10-CM

## 2021-07-30 DIAGNOSIS — M79.18 MYOFASCIAL PAIN: ICD-10-CM

## 2021-07-30 DIAGNOSIS — G89.29 CHRONIC RIGHT SHOULDER PAIN: ICD-10-CM

## 2021-07-30 PROCEDURE — 1160F RVW MEDS BY RX/DR IN RCRD: CPT | Mod: CPTII,S$GLB,, | Performed by: ORTHOPAEDIC SURGERY

## 2021-07-30 PROCEDURE — 1160F PR REVIEW ALL MEDS BY PRESCRIBER/CLIN PHARMACIST DOCUMENTED: ICD-10-PCS | Mod: CPTII,S$GLB,, | Performed by: ORTHOPAEDIC SURGERY

## 2021-07-30 PROCEDURE — 3008F PR BODY MASS INDEX (BMI) DOCUMENTED: ICD-10-PCS | Mod: CPTII,S$GLB,, | Performed by: ORTHOPAEDIC SURGERY

## 2021-07-30 PROCEDURE — 1159F PR MEDICATION LIST DOCUMENTED IN MEDICAL RECORD: ICD-10-PCS | Mod: CPTII,S$GLB,, | Performed by: ORTHOPAEDIC SURGERY

## 2021-07-30 PROCEDURE — 99213 PR OFFICE/OUTPT VISIT, EST, LEVL III, 20-29 MIN: ICD-10-PCS | Mod: S$GLB,,, | Performed by: ORTHOPAEDIC SURGERY

## 2021-07-30 PROCEDURE — 3077F PR MOST RECENT SYSTOLIC BLOOD PRESSURE >= 140 MM HG: ICD-10-PCS | Mod: CPTII,S$GLB,, | Performed by: ORTHOPAEDIC SURGERY

## 2021-07-30 PROCEDURE — 3077F SYST BP >= 140 MM HG: CPT | Mod: CPTII,S$GLB,, | Performed by: ORTHOPAEDIC SURGERY

## 2021-07-30 PROCEDURE — 3078F PR MOST RECENT DIASTOLIC BLOOD PRESSURE < 80 MM HG: ICD-10-PCS | Mod: CPTII,S$GLB,, | Performed by: ORTHOPAEDIC SURGERY

## 2021-07-30 PROCEDURE — 3008F BODY MASS INDEX DOCD: CPT | Mod: CPTII,S$GLB,, | Performed by: ORTHOPAEDIC SURGERY

## 2021-07-30 PROCEDURE — 3078F DIAST BP <80 MM HG: CPT | Mod: CPTII,S$GLB,, | Performed by: ORTHOPAEDIC SURGERY

## 2021-07-30 PROCEDURE — 97140 MANUAL THERAPY 1/> REGIONS: CPT | Mod: PN

## 2021-07-30 PROCEDURE — 1125F AMNT PAIN NOTED PAIN PRSNT: CPT | Mod: CPTII,S$GLB,, | Performed by: ORTHOPAEDIC SURGERY

## 2021-07-30 PROCEDURE — 97110 THERAPEUTIC EXERCISES: CPT | Mod: PN

## 2021-07-30 PROCEDURE — 1125F PR PAIN SEVERITY QUANTIFIED, PAIN PRESENT: ICD-10-PCS | Mod: CPTII,S$GLB,, | Performed by: ORTHOPAEDIC SURGERY

## 2021-07-30 PROCEDURE — 99999 PR PBB SHADOW E&M-EST. PATIENT-LVL IV: CPT | Mod: PBBFAC,,, | Performed by: ORTHOPAEDIC SURGERY

## 2021-07-30 PROCEDURE — 99213 OFFICE O/P EST LOW 20 MIN: CPT | Mod: S$GLB,,, | Performed by: ORTHOPAEDIC SURGERY

## 2021-07-30 PROCEDURE — 99999 PR PBB SHADOW E&M-EST. PATIENT-LVL IV: ICD-10-PCS | Mod: PBBFAC,,, | Performed by: ORTHOPAEDIC SURGERY

## 2021-07-30 PROCEDURE — 1159F MED LIST DOCD IN RCRD: CPT | Mod: CPTII,S$GLB,, | Performed by: ORTHOPAEDIC SURGERY

## 2021-07-30 RX ORDER — DICLOFENAC SODIUM 10 MG/G
2 GEL TOPICAL 4 TIMES DAILY
Qty: 1 TUBE | Refills: 2 | Status: SHIPPED | OUTPATIENT
Start: 2021-07-30 | End: 2021-10-05

## 2021-08-10 ENCOUNTER — CLINICAL SUPPORT (OUTPATIENT)
Dept: REHABILITATION | Facility: HOSPITAL | Age: 61
End: 2021-08-10
Attending: INTERNAL MEDICINE
Payer: MEDICARE

## 2021-08-10 DIAGNOSIS — M25.611 SHOULDER STIFFNESS, RIGHT: ICD-10-CM

## 2021-08-10 DIAGNOSIS — M25.611 DECREASED RANGE OF MOTION OF RIGHT SHOULDER: ICD-10-CM

## 2021-08-10 DIAGNOSIS — G89.29 CHRONIC RIGHT SHOULDER PAIN: ICD-10-CM

## 2021-08-10 DIAGNOSIS — M25.511 CHRONIC RIGHT SHOULDER PAIN: ICD-10-CM

## 2021-08-10 DIAGNOSIS — M79.18 MYOFASCIAL PAIN: ICD-10-CM

## 2021-08-10 PROCEDURE — 97110 THERAPEUTIC EXERCISES: CPT | Mod: PN,CQ

## 2021-08-17 ENCOUNTER — CLINICAL SUPPORT (OUTPATIENT)
Dept: REHABILITATION | Facility: HOSPITAL | Age: 61
End: 2021-08-17
Attending: INTERNAL MEDICINE
Payer: MEDICARE

## 2021-08-17 DIAGNOSIS — M25.511 CHRONIC RIGHT SHOULDER PAIN: ICD-10-CM

## 2021-08-17 DIAGNOSIS — M25.611 SHOULDER STIFFNESS, RIGHT: ICD-10-CM

## 2021-08-17 DIAGNOSIS — M79.18 MYOFASCIAL PAIN: ICD-10-CM

## 2021-08-17 DIAGNOSIS — M25.611 DECREASED RANGE OF MOTION OF RIGHT SHOULDER: ICD-10-CM

## 2021-08-17 DIAGNOSIS — G89.29 CHRONIC RIGHT SHOULDER PAIN: ICD-10-CM

## 2021-08-17 PROCEDURE — 97110 THERAPEUTIC EXERCISES: CPT | Mod: PN,CQ

## 2021-08-25 ENCOUNTER — SPECIALTY PHARMACY (OUTPATIENT)
Dept: PHARMACY | Facility: CLINIC | Age: 61
End: 2021-08-25

## 2021-09-13 ENCOUNTER — CLINICAL SUPPORT (OUTPATIENT)
Dept: REHABILITATION | Facility: HOSPITAL | Age: 61
End: 2021-09-13
Attending: INTERNAL MEDICINE
Payer: MEDICARE

## 2021-09-13 DIAGNOSIS — M25.611 DECREASED RANGE OF MOTION OF RIGHT SHOULDER: ICD-10-CM

## 2021-09-13 DIAGNOSIS — M79.18 MYOFASCIAL PAIN: ICD-10-CM

## 2021-09-13 DIAGNOSIS — M25.611 SHOULDER STIFFNESS, RIGHT: ICD-10-CM

## 2021-09-13 DIAGNOSIS — M25.511 CHRONIC RIGHT SHOULDER PAIN: ICD-10-CM

## 2021-09-13 DIAGNOSIS — G89.29 CHRONIC RIGHT SHOULDER PAIN: ICD-10-CM

## 2021-09-13 PROCEDURE — 97110 THERAPEUTIC EXERCISES: CPT | Mod: PN

## 2021-09-17 ENCOUNTER — CLINICAL SUPPORT (OUTPATIENT)
Dept: REHABILITATION | Facility: HOSPITAL | Age: 61
End: 2021-09-17
Attending: INTERNAL MEDICINE
Payer: MEDICARE

## 2021-09-17 DIAGNOSIS — M25.611 SHOULDER STIFFNESS, RIGHT: ICD-10-CM

## 2021-09-17 DIAGNOSIS — M25.511 CHRONIC RIGHT SHOULDER PAIN: ICD-10-CM

## 2021-09-17 DIAGNOSIS — G89.29 CHRONIC RIGHT SHOULDER PAIN: ICD-10-CM

## 2021-09-17 DIAGNOSIS — M79.18 MYOFASCIAL PAIN: ICD-10-CM

## 2021-09-17 DIAGNOSIS — M25.611 DECREASED RANGE OF MOTION OF RIGHT SHOULDER: ICD-10-CM

## 2021-09-17 PROCEDURE — 97110 THERAPEUTIC EXERCISES: CPT | Mod: PN

## 2021-09-20 ENCOUNTER — CLINICAL SUPPORT (OUTPATIENT)
Dept: REHABILITATION | Facility: HOSPITAL | Age: 61
End: 2021-09-20
Attending: INTERNAL MEDICINE
Payer: MEDICARE

## 2021-09-20 DIAGNOSIS — M25.611 SHOULDER STIFFNESS, RIGHT: ICD-10-CM

## 2021-09-20 DIAGNOSIS — M25.611 DECREASED RANGE OF MOTION OF RIGHT SHOULDER: ICD-10-CM

## 2021-09-20 DIAGNOSIS — G89.29 CHRONIC RIGHT SHOULDER PAIN: ICD-10-CM

## 2021-09-20 DIAGNOSIS — M79.18 MYOFASCIAL PAIN: ICD-10-CM

## 2021-09-20 DIAGNOSIS — M25.511 CHRONIC RIGHT SHOULDER PAIN: ICD-10-CM

## 2021-09-20 PROCEDURE — 97110 THERAPEUTIC EXERCISES: CPT | Mod: PN

## 2021-09-22 ENCOUNTER — SPECIALTY PHARMACY (OUTPATIENT)
Dept: PHARMACY | Facility: CLINIC | Age: 61
End: 2021-09-22

## 2021-10-01 ENCOUNTER — CLINICAL SUPPORT (OUTPATIENT)
Dept: REHABILITATION | Facility: HOSPITAL | Age: 61
End: 2021-10-01
Attending: INTERNAL MEDICINE
Payer: MEDICARE

## 2021-10-01 DIAGNOSIS — M79.18 MYOFASCIAL PAIN: ICD-10-CM

## 2021-10-01 DIAGNOSIS — M25.511 CHRONIC RIGHT SHOULDER PAIN: ICD-10-CM

## 2021-10-01 DIAGNOSIS — M25.611 DECREASED RANGE OF MOTION OF RIGHT SHOULDER: ICD-10-CM

## 2021-10-01 DIAGNOSIS — G89.29 CHRONIC RIGHT SHOULDER PAIN: ICD-10-CM

## 2021-10-01 DIAGNOSIS — M25.611 SHOULDER STIFFNESS, RIGHT: ICD-10-CM

## 2021-10-01 PROCEDURE — 97110 THERAPEUTIC EXERCISES: CPT | Mod: KX,PN

## 2021-10-05 ENCOUNTER — LAB VISIT (OUTPATIENT)
Dept: LAB | Facility: HOSPITAL | Age: 61
End: 2021-10-05
Attending: INTERNAL MEDICINE
Payer: MEDICARE

## 2021-10-05 ENCOUNTER — OFFICE VISIT (OUTPATIENT)
Dept: RHEUMATOLOGY | Facility: CLINIC | Age: 61
End: 2021-10-05
Payer: MEDICARE

## 2021-10-05 VITALS
HEART RATE: 80 BPM | BODY MASS INDEX: 32.76 KG/M2 | SYSTOLIC BLOOD PRESSURE: 149 MMHG | DIASTOLIC BLOOD PRESSURE: 79 MMHG | HEIGHT: 65 IN | WEIGHT: 196.63 LBS

## 2021-10-05 DIAGNOSIS — M05.741 RHEUMATOID ARTHRITIS INVOLVING BOTH HANDS WITH POSITIVE RHEUMATOID FACTOR: Primary | ICD-10-CM

## 2021-10-05 DIAGNOSIS — M70.71 BURSITIS OF OTHER BURSA OF RIGHT HIP: ICD-10-CM

## 2021-10-05 DIAGNOSIS — M05.742 RHEUMATOID ARTHRITIS INVOLVING BOTH HANDS WITH POSITIVE RHEUMATOID FACTOR: Primary | ICD-10-CM

## 2021-10-05 DIAGNOSIS — M05.741 RHEUMATOID ARTHRITIS INVOLVING BOTH HANDS WITH POSITIVE RHEUMATOID FACTOR: ICD-10-CM

## 2021-10-05 DIAGNOSIS — M20.42 HAMMER TOES OF BOTH FEET: ICD-10-CM

## 2021-10-05 DIAGNOSIS — M05.742 RHEUMATOID ARTHRITIS INVOLVING BOTH HANDS WITH POSITIVE RHEUMATOID FACTOR: ICD-10-CM

## 2021-10-05 DIAGNOSIS — M20.41 HAMMER TOES OF BOTH FEET: ICD-10-CM

## 2021-10-05 LAB
ALBUMIN SERPL BCP-MCNC: 3.8 G/DL (ref 3.5–5.2)
ALP SERPL-CCNC: 122 U/L (ref 55–135)
ALT SERPL W/O P-5'-P-CCNC: 24 U/L (ref 10–44)
ANION GAP SERPL CALC-SCNC: 15 MMOL/L (ref 8–16)
AST SERPL-CCNC: 18 U/L (ref 10–40)
BASOPHILS # BLD AUTO: 0.04 K/UL (ref 0–0.2)
BASOPHILS NFR BLD: 0.7 % (ref 0–1.9)
BILIRUB SERPL-MCNC: 0.3 MG/DL (ref 0.1–1)
BUN SERPL-MCNC: 16 MG/DL (ref 6–20)
CALCIUM SERPL-MCNC: 9.5 MG/DL (ref 8.7–10.5)
CHLORIDE SERPL-SCNC: 101 MMOL/L (ref 95–110)
CO2 SERPL-SCNC: 26 MMOL/L (ref 23–29)
CREAT SERPL-MCNC: 0.8 MG/DL (ref 0.5–1.4)
CRP SERPL-MCNC: 2.2 MG/L (ref 0–8.2)
DIFFERENTIAL METHOD: ABNORMAL
EOSINOPHIL # BLD AUTO: 0.2 K/UL (ref 0–0.5)
EOSINOPHIL NFR BLD: 3.1 % (ref 0–8)
ERYTHROCYTE [DISTWIDTH] IN BLOOD BY AUTOMATED COUNT: 15.9 % (ref 11.5–14.5)
ERYTHROCYTE [SEDIMENTATION RATE] IN BLOOD BY WESTERGREN METHOD: 15 MM/HR (ref 0–36)
EST. GFR  (AFRICAN AMERICAN): >60 ML/MIN/1.73 M^2
EST. GFR  (NON AFRICAN AMERICAN): >60 ML/MIN/1.73 M^2
GLUCOSE SERPL-MCNC: 222 MG/DL (ref 70–110)
HCT VFR BLD AUTO: 36.5 % (ref 37–48.5)
HGB BLD-MCNC: 11.3 G/DL (ref 12–16)
IMM GRANULOCYTES # BLD AUTO: 0.01 K/UL (ref 0–0.04)
IMM GRANULOCYTES NFR BLD AUTO: 0.2 % (ref 0–0.5)
LYMPHOCYTES # BLD AUTO: 2.2 K/UL (ref 1–4.8)
LYMPHOCYTES NFR BLD: 41 % (ref 18–48)
MCH RBC QN AUTO: 23.5 PG (ref 27–31)
MCHC RBC AUTO-ENTMCNC: 31 G/DL (ref 32–36)
MCV RBC AUTO: 76 FL (ref 82–98)
MONOCYTES # BLD AUTO: 0.5 K/UL (ref 0.3–1)
MONOCYTES NFR BLD: 8.8 % (ref 4–15)
NEUTROPHILS # BLD AUTO: 2.5 K/UL (ref 1.8–7.7)
NEUTROPHILS NFR BLD: 46.2 % (ref 38–73)
NRBC BLD-RTO: 0 /100 WBC
PLATELET # BLD AUTO: 372 K/UL (ref 150–450)
PMV BLD AUTO: 9.2 FL (ref 9.2–12.9)
POTASSIUM SERPL-SCNC: 3.6 MMOL/L (ref 3.5–5.1)
PROT SERPL-MCNC: 7.3 G/DL (ref 6–8.4)
RBC # BLD AUTO: 4.81 M/UL (ref 4–5.4)
SODIUM SERPL-SCNC: 142 MMOL/L (ref 136–145)
WBC # BLD AUTO: 5.46 K/UL (ref 3.9–12.7)

## 2021-10-05 PROCEDURE — 85652 RBC SED RATE AUTOMATED: CPT | Performed by: INTERNAL MEDICINE

## 2021-10-05 PROCEDURE — 3077F PR MOST RECENT SYSTOLIC BLOOD PRESSURE >= 140 MM HG: ICD-10-PCS | Mod: CPTII,S$GLB,, | Performed by: INTERNAL MEDICINE

## 2021-10-05 PROCEDURE — 4010F ACE/ARB THERAPY RXD/TAKEN: CPT | Mod: CPTII,S$GLB,, | Performed by: INTERNAL MEDICINE

## 2021-10-05 PROCEDURE — 3008F PR BODY MASS INDEX (BMI) DOCUMENTED: ICD-10-PCS | Mod: CPTII,S$GLB,, | Performed by: INTERNAL MEDICINE

## 2021-10-05 PROCEDURE — 99214 PR OFFICE/OUTPT VISIT, EST, LEVL IV, 30-39 MIN: ICD-10-PCS | Mod: S$GLB,,, | Performed by: INTERNAL MEDICINE

## 2021-10-05 PROCEDURE — 3078F DIAST BP <80 MM HG: CPT | Mod: CPTII,S$GLB,, | Performed by: INTERNAL MEDICINE

## 2021-10-05 PROCEDURE — 1159F MED LIST DOCD IN RCRD: CPT | Mod: CPTII,S$GLB,, | Performed by: INTERNAL MEDICINE

## 2021-10-05 PROCEDURE — 36415 COLL VENOUS BLD VENIPUNCTURE: CPT | Performed by: INTERNAL MEDICINE

## 2021-10-05 PROCEDURE — 80053 COMPREHEN METABOLIC PANEL: CPT | Performed by: INTERNAL MEDICINE

## 2021-10-05 PROCEDURE — 99999 PR PBB SHADOW E&M-EST. PATIENT-LVL V: ICD-10-PCS | Mod: PBBFAC,,, | Performed by: INTERNAL MEDICINE

## 2021-10-05 PROCEDURE — 3077F SYST BP >= 140 MM HG: CPT | Mod: CPTII,S$GLB,, | Performed by: INTERNAL MEDICINE

## 2021-10-05 PROCEDURE — 1159F PR MEDICATION LIST DOCUMENTED IN MEDICAL RECORD: ICD-10-PCS | Mod: CPTII,S$GLB,, | Performed by: INTERNAL MEDICINE

## 2021-10-05 PROCEDURE — 3008F BODY MASS INDEX DOCD: CPT | Mod: CPTII,S$GLB,, | Performed by: INTERNAL MEDICINE

## 2021-10-05 PROCEDURE — 4010F PR ACE/ARB THEARPY RXD/TAKEN: ICD-10-PCS | Mod: CPTII,S$GLB,, | Performed by: INTERNAL MEDICINE

## 2021-10-05 PROCEDURE — 99214 OFFICE O/P EST MOD 30 MIN: CPT | Mod: S$GLB,,, | Performed by: INTERNAL MEDICINE

## 2021-10-05 PROCEDURE — 99999 PR PBB SHADOW E&M-EST. PATIENT-LVL V: CPT | Mod: PBBFAC,,, | Performed by: INTERNAL MEDICINE

## 2021-10-05 PROCEDURE — 85025 COMPLETE CBC W/AUTO DIFF WBC: CPT | Performed by: INTERNAL MEDICINE

## 2021-10-05 PROCEDURE — 3078F PR MOST RECENT DIASTOLIC BLOOD PRESSURE < 80 MM HG: ICD-10-PCS | Mod: CPTII,S$GLB,, | Performed by: INTERNAL MEDICINE

## 2021-10-05 PROCEDURE — 86140 C-REACTIVE PROTEIN: CPT | Performed by: INTERNAL MEDICINE

## 2021-10-05 RX ORDER — DULOXETIN HYDROCHLORIDE 30 MG/1
CAPSULE, DELAYED RELEASE ORAL
Qty: 60 CAPSULE | Refills: 5 | Status: SHIPPED | OUTPATIENT
Start: 2021-10-05 | End: 2022-03-17

## 2021-10-05 RX ORDER — GABAPENTIN 100 MG/1
CAPSULE ORAL
Qty: 60 CAPSULE | Refills: 5 | Status: SHIPPED | OUTPATIENT
Start: 2021-10-05 | End: 2022-03-17

## 2021-10-05 RX ORDER — AMOXICILLIN 500 MG/1
500 CAPSULE ORAL 2 TIMES DAILY
COMMUNITY
Start: 2021-05-14 | End: 2022-03-17

## 2021-10-05 RX ORDER — BLOOD-GLUCOSE METER
EACH MISCELLANEOUS
COMMUNITY
Start: 2021-07-28 | End: 2022-04-04

## 2021-10-05 RX ORDER — NITROFURANTOIN 25; 75 MG/1; MG/1
CAPSULE ORAL
COMMUNITY
Start: 2021-09-27 | End: 2022-04-04

## 2021-10-05 RX ORDER — KETOCONAZOLE 20 MG/G
CREAM TOPICAL
COMMUNITY
Start: 2021-09-27

## 2021-10-05 RX ORDER — GABAPENTIN 100 MG/1
100 CAPSULE ORAL 3 TIMES DAILY
COMMUNITY
Start: 2021-06-26 | End: 2021-10-05

## 2021-10-05 RX ORDER — DICLOFENAC SODIUM 10 MG/G
2 GEL TOPICAL 4 TIMES DAILY
Qty: 1 TUBE | Refills: 2 | Status: SHIPPED | OUTPATIENT
Start: 2021-10-05

## 2021-10-08 ENCOUNTER — CLINICAL SUPPORT (OUTPATIENT)
Dept: REHABILITATION | Facility: HOSPITAL | Age: 61
End: 2021-10-08
Attending: INTERNAL MEDICINE
Payer: MEDICARE

## 2021-10-08 DIAGNOSIS — M79.18 MYOFASCIAL PAIN: ICD-10-CM

## 2021-10-08 DIAGNOSIS — M25.611 DECREASED RANGE OF MOTION OF RIGHT SHOULDER: ICD-10-CM

## 2021-10-08 DIAGNOSIS — G89.29 CHRONIC RIGHT SHOULDER PAIN: ICD-10-CM

## 2021-10-08 DIAGNOSIS — M25.611 SHOULDER STIFFNESS, RIGHT: ICD-10-CM

## 2021-10-08 DIAGNOSIS — M25.511 CHRONIC RIGHT SHOULDER PAIN: ICD-10-CM

## 2021-10-08 PROCEDURE — 97110 THERAPEUTIC EXERCISES: CPT | Mod: PN,CQ

## 2021-10-21 ENCOUNTER — SPECIALTY PHARMACY (OUTPATIENT)
Dept: PHARMACY | Facility: CLINIC | Age: 61
End: 2021-10-21
Payer: MEDICARE

## 2021-10-22 ENCOUNTER — DOCUMENTATION ONLY (OUTPATIENT)
Dept: REHABILITATION | Facility: HOSPITAL | Age: 61
End: 2021-10-22

## 2021-10-29 ENCOUNTER — CLINICAL SUPPORT (OUTPATIENT)
Dept: REHABILITATION | Facility: HOSPITAL | Age: 61
End: 2021-10-29
Attending: INTERNAL MEDICINE
Payer: MEDICARE

## 2021-10-29 DIAGNOSIS — M25.611 SHOULDER STIFFNESS, RIGHT: ICD-10-CM

## 2021-10-29 DIAGNOSIS — G89.29 CHRONIC RIGHT SHOULDER PAIN: ICD-10-CM

## 2021-10-29 DIAGNOSIS — M25.611 DECREASED RANGE OF MOTION OF RIGHT SHOULDER: ICD-10-CM

## 2021-10-29 DIAGNOSIS — M25.511 CHRONIC RIGHT SHOULDER PAIN: ICD-10-CM

## 2021-10-29 DIAGNOSIS — M79.18 MYOFASCIAL PAIN: ICD-10-CM

## 2021-10-29 PROCEDURE — 97110 THERAPEUTIC EXERCISES: CPT | Mod: KX,PN

## 2021-11-19 ENCOUNTER — SPECIALTY PHARMACY (OUTPATIENT)
Dept: PHARMACY | Facility: CLINIC | Age: 61
End: 2021-11-19
Payer: MEDICARE

## 2021-12-20 ENCOUNTER — SPECIALTY PHARMACY (OUTPATIENT)
Dept: PHARMACY | Facility: CLINIC | Age: 61
End: 2021-12-20
Payer: MEDICARE

## 2022-01-18 ENCOUNTER — PATIENT MESSAGE (OUTPATIENT)
Dept: PHARMACY | Facility: CLINIC | Age: 62
End: 2022-01-18
Payer: MEDICARE

## 2022-01-22 ENCOUNTER — SPECIALTY PHARMACY (OUTPATIENT)
Dept: PHARMACY | Facility: CLINIC | Age: 62
End: 2022-01-22
Payer: MEDICARE

## 2022-01-22 NOTE — TELEPHONE ENCOUNTER
Specialty Pharmacy - Refill Coordination    Specialty Medication Orders Linked to Encounter    Flowsheet Row Most Recent Value   Medication #1 abatacept (ORENCIA CLICKJECT) 125 mg/mL AtIn (Order#[id number], Rx#[id number])          Refill Questions - Documented Responses    Flowsheet Row Most Recent Value   Patient Availability and HIPAA Verification    Does patient want to proceed with activity? Yes   HIPAA/medical authority confirmed? Yes   Relationship to patient of person spoken to? Self   Refill Screening Questions    Changes to allergies? No   Changes to medications? No   New conditions since last clinic visit? No   Unplanned office visit, urgent care, ED, or hospital admission in the last 4 weeks? No   How does patient/caregiver feel medication is working? Good   Financial problems or insurance changes? No   How many doses of your specialty medications were missed in the last 4 weeks? 0   Would patient like to speak to a pharmacist? No   When does the patient need to receive the medication? 01/28/22   Refill Delivery Questions    How will the patient receive the medication? Pickup   When does the patient need to receive the medication? 01/28/22   Shipping Address Home   Address in [facility] confirmed and updated if neccessary? Yes   Expected Copay ($) 0   Is the patient able to afford the medication copay? Yes   Payment Method zero copay   Days supply of Refill 28   Supplies needed? No supplies needed   Refill activity completed? Yes   Refill activity plan Refill scheduled   Shipment/Pickup Date: 01/24/22          Current Outpatient Medications   Medication Sig    abatacept (ORENCIA CLICKJECT) 125 mg/mL AtIn Inject 125 mg into the skin every 7 days.    albuterol (PROVENTIL) 2.5 mg /3 mL (0.083 %) nebulizer solution     amLODIPine (NORVASC) 10 MG tablet     amoxicillin (AMOXIL) 500 MG capsule Take 500 mg by mouth 2 (two) times daily.    ascorbic acid, vitamin C, (VITAMIN C) 1000 MG tablet Take  "1,000 mg by mouth once daily.    aspirin (ECOTRIN) 81 MG EC tablet Take 81 mg by mouth once daily.    atorvastatin (LIPITOR) 20 MG tablet     BASAGLAR KWIKPEN U-100 INSULIN glargine 100 units/mL (3mL) SubQ pen     BD ULTRA-FINE SHORT PEN NEEDLE 31 gauge x 5/16" Ndle     betamethasone dipropionate (DIPROLENE) 0.05 % cream     cetirizine (ZYRTEC) 10 MG tablet Take 10 mg by mouth once daily.    clobetasol 0.05% (TEMOVATE) 0.05 % Oint Apply to affected area BID    clobetasol 0.05% (TEMOVATE) 0.05 % Oint Apply topically 2 (two) times daily. To rash on hands for flares up to 2 weeks (Patient not taking: Reported on 10/5/2021)    cyclobenzaprine (FLEXERIL) 10 MG tablet     diclofenac sodium (VOLTAREN) 1 % Gel Apply 2 g topically 4 (four) times daily.    DULoxetine (CYMBALTA) 30 MG capsule One to two capsules daily    echinacea 500 mg Cap Take by mouth.    ELDERBERRY FRUIT ORAL Take by mouth.    enalapril (VASOTEC) 20 MG tablet     fluticasone propionate (FLONASE) 50 mcg/actuation nasal spray     gabapentin (NEURONTIN) 100 MG capsule One to two capsules daily    hydroCHLOROthiazide (HYDRODIURIL) 25 MG tablet     insulin lispro 100 unit/mL pen     ketoconazole (NIZORAL) 2 % cream APPLY CREAM TOPICALLY TO AFFECTED AREA TWICE DAILY FOR 14 DAYS    LEVEMIR FLEXTOUCH U-100 INSULN 100 unit/mL (3 mL) InPn pen     metFORMIN (GLUMETZA) 1000 MG (MOD) 24 hr tablet Take 1,000 mg by mouth daily with breakfast.    nebulizer and compressor ("[employer] AEROSOL DELIVERY SYSTEM) Nika USE TID UTD    nitrofurantoin, macrocrystal-monohydrate, (MACROBID) 100 MG capsule     NOVOLOG FLEXPEN U-100 INSULIN 100 unit/mL (3 mL) InPn pen     omeprazole (PRILOSEC) 40 MG capsule     PROVENTIL HFA 90 mcg/actuation inhaler     triamcinolone acetonide 0.1% (KENALOG) 0.1 % cream Apply topically.    TRUE METRIX GLUCOSE METER Misc USE AS DIRECTED TWICE DAILY    vit A and D3 in cod liver oiL (COD LIVER OIL) 4,000 unit-400 unit/5 mL Liqd " "Take by mouth.   Last reviewed on 10/5/2021  8:25 AM by Carmen Guidry MA    Review of patient's allergies indicates:   Allergen Reactions    Codeine Other (See Comments)     "it makes me feel crazy"    Prednisone Palpitations     "it makes my heart beat fast. I can take the shot"    Last reviewed on  10/5/2021 8:22 AM by Carmen Guidry      Tasks added this encounter   2/18/2022 - Refill Call (Auto Added)  1/25/2022 - Pickup Reminder   Tasks due within next 3 months   No tasks due.     [facility] [address] [facility] Specialty Pharmacy  [address]  [facility] [address]  Phone: [phone]  Fax: [phone]      "

## 2022-02-18 ENCOUNTER — PATIENT MESSAGE (OUTPATIENT)
Dept: PHARMACY | Facility: CLINIC | Age: 62
End: 2022-02-18
Payer: MEDICARE

## 2022-02-18 RX ORDER — ABATACEPT 125 MG/ML
125 INJECTION, SOLUTION SUBCUTANEOUS
Qty: 4 ML | Refills: 11 | Status: SHIPPED | OUTPATIENT
Start: 2022-02-18 | End: 2023-02-22 | Stop reason: SDUPTHER

## 2022-02-23 DIAGNOSIS — D84.9 IMMUNOSUPPRESSED STATUS: ICD-10-CM

## 2022-02-24 ENCOUNTER — SPECIALTY PHARMACY (OUTPATIENT)
Dept: PHARMACY | Facility: CLINIC | Age: 62
End: 2022-02-24
Payer: MEDICARE

## 2022-03-02 NOTE — TELEPHONE ENCOUNTER
Specialty Pharmacy - Medication/Referral Authorization  Specialty Pharmacy - Refill Coordination    Specialty Medication Orders Linked to Encounter    Flowsheet Row Most Recent Value   Medication #1 abatacept (ORENCIA CLICKJECT) 125 mg/mL AtIn (Order#[id number], Rx#[id number])          Refill Questions - Documented Responses    Flowsheet Row Most Recent Value   Patient Availability and HIPAA Verification    Does patient want to proceed with activity? Yes   HIPAA/medical authority confirmed? Yes   Relationship to patient of person spoken to? Self   Refill Screening Questions    Changes to allergies? No   Changes to medications? No   New conditions since last clinic visit? No   Unplanned office visit, urgent care, ED, or hospital admission in the last 4 weeks? No   How does patient/caregiver feel medication is working? Very good   Financial problems or insurance changes? No   How many doses of your specialty medications were missed in the last 4 weeks? 0   Would patient like to speak to a pharmacist? No   When does the patient need to receive the medication? 03/03/22   Refill Delivery Questions    How will the patient receive the medication? Pickup   When does the patient need to receive the medication? 03/03/22   Address in [facility] confirmed and updated if neccessary? Yes   Expected Copay ($) 0   Is the patient able to afford the medication copay? Yes   Payment Method zero copay   Days supply of Refill 28   Supplies needed? No supplies needed   Refill activity completed? Yes   Refill activity plan Refill scheduled   Shipment/Pickup Date: 03/03/22          Current Outpatient Medications   Medication Sig    abatacept (ORENCIA CLICKJECT) 125 mg/mL AtIn Inject 125 mg into the skin every 7 days.    albuterol (PROVENTIL) 2.5 mg /3 mL (0.083 %) nebulizer solution     amLODIPine (NORVASC) 10 MG tablet     amoxicillin (AMOXIL) 500 MG capsule Take 500 mg by mouth 2 (two) times daily.    ascorbic acid, vitamin C,  "(VITAMIN C) 1000 MG tablet Take 1,000 mg by mouth once daily.    aspirin (ECOTRIN) 81 MG EC tablet Take 81 mg by mouth once daily.    atorvastatin (LIPITOR) 20 MG tablet     BASAGLAR KWIKPEN U-100 INSULIN glargine 100 units/mL (3mL) SubQ pen     BD ULTRA-FINE SHORT PEN NEEDLE 31 gauge x 5/16" Ndle     betamethasone dipropionate (DIPROLENE) 0.05 % cream     cetirizine (ZYRTEC) 10 MG tablet Take 10 mg by mouth once daily.    clobetasol 0.05% (TEMOVATE) 0.05 % Oint Apply to affected area BID    clobetasol 0.05% (TEMOVATE) 0.05 % Oint Apply topically 2 (two) times daily. To rash on hands for flares up to 2 weeks (Patient not taking: Reported on 10/5/2021)    cyclobenzaprine (FLEXERIL) 10 MG tablet     diclofenac sodium (VOLTAREN) 1 % Gel Apply 2 g topically 4 (four) times daily.    DULoxetine (CYMBALTA) 30 MG capsule One to two capsules daily    echinacea 500 mg Cap Take by mouth.    ELDERBERRY FRUIT ORAL Take by mouth.    enalapril (VASOTEC) 20 MG tablet     fluticasone propionate (FLONASE) 50 mcg/actuation nasal spray     gabapentin (NEURONTIN) 100 MG capsule One to two capsules daily    hydroCHLOROthiazide (HYDRODIURIL) 25 MG tablet     insulin lispro 100 unit/mL pen     ketoconazole (NIZORAL) 2 % cream APPLY CREAM TOPICALLY TO AFFECTED AREA TWICE DAILY FOR 14 DAYS    LEVEMIR FLEXTOUCH U-100 INSULN 100 unit/mL (3 mL) InPn pen     metFORMIN (GLUMETZA) 1000 MG (MOD) 24 hr tablet Take 1,000 mg by mouth daily with breakfast.    nebulizer and compressor ([employer] AEROSOL DELIVERY SYSTEM) Nika USE TID UTD    nitrofurantoin, macrocrystal-monohydrate, (MACROBID) 100 MG capsule     NOVOLOG FLEXPEN U-100 INSULIN 100 unit/mL (3 mL) InPn pen     omeprazole (PRILOSEC) 40 MG capsule     PROVENTIL HFA 90 mcg/actuation inhaler     triamcinolone acetonide 0.1% (KENALOG) 0.1 % cream Apply topically.    TRUE METRIX GLUCOSE METER Misc USE AS DIRECTED TWICE DAILY    vit A and D3 in cod liver oiL (COD LIVER OIL) " "4,000 unit-400 unit/5 mL Liqd Take by mouth.   Last reviewed on 10/5/2021  8:25 AM by Carmen Guidry MA    Review of patient's allergies indicates:   Allergen Reactions    Codeine Other (See Comments)     "it makes me feel crazy"    Prednisone Palpitations     "it makes my heart beat fast. I can take the shot"    Last reviewed on  10/5/2021 8:22 AM by Carmen Guidry      Tasks added this encounter   3/24/2022 - Refill Call (Auto Added)   Tasks due within next 3 months   No tasks due.     Avila Torres, PharmD  [facility]  [address]  [facility] [address]  Phone: [phone]  Fax: [phone]      "

## 2022-03-12 ENCOUNTER — PATIENT MESSAGE (OUTPATIENT)
Dept: RHEUMATOLOGY | Facility: CLINIC | Age: 62
End: 2022-03-12
Payer: MEDICARE

## 2022-03-17 ENCOUNTER — OFFICE VISIT (OUTPATIENT)
Dept: RHEUMATOLOGY | Facility: CLINIC | Age: 62
End: 2022-03-17
Payer: MEDICARE

## 2022-03-17 ENCOUNTER — LAB VISIT (OUTPATIENT)
Dept: LAB | Facility: HOSPITAL | Age: 62
End: 2022-03-17
Attending: INTERNAL MEDICINE
Payer: MEDICARE

## 2022-03-17 VITALS
HEIGHT: 65 IN | SYSTOLIC BLOOD PRESSURE: 132 MMHG | DIASTOLIC BLOOD PRESSURE: 76 MMHG | WEIGHT: 185 LBS | BODY MASS INDEX: 30.82 KG/M2 | HEART RATE: 90 BPM

## 2022-03-17 DIAGNOSIS — M05.741 RHEUMATOID ARTHRITIS INVOLVING BOTH HANDS WITH POSITIVE RHEUMATOID FACTOR: Primary | ICD-10-CM

## 2022-03-17 DIAGNOSIS — M05.742 RHEUMATOID ARTHRITIS INVOLVING BOTH HANDS WITH POSITIVE RHEUMATOID FACTOR: Primary | ICD-10-CM

## 2022-03-17 DIAGNOSIS — M70.71 BURSITIS OF OTHER BURSA OF RIGHT HIP: ICD-10-CM

## 2022-03-17 DIAGNOSIS — M25.511 ARTHRALGIA OF RIGHT SHOULDER REGION: ICD-10-CM

## 2022-03-17 DIAGNOSIS — M47.812 SPONDYLOSIS OF CERVICAL REGION WITHOUT MYELOPATHY OR RADICULOPATHY: ICD-10-CM

## 2022-03-17 DIAGNOSIS — M05.741 RHEUMATOID ARTHRITIS INVOLVING BOTH HANDS WITH POSITIVE RHEUMATOID FACTOR: ICD-10-CM

## 2022-03-17 DIAGNOSIS — M79.7 FIBROMYALGIA: ICD-10-CM

## 2022-03-17 DIAGNOSIS — M05.742 RHEUMATOID ARTHRITIS INVOLVING BOTH HANDS WITH POSITIVE RHEUMATOID FACTOR: ICD-10-CM

## 2022-03-17 DIAGNOSIS — M12.811 ROTATOR CUFF ARTHROPATHY OF RIGHT SHOULDER: ICD-10-CM

## 2022-03-17 LAB
ALBUMIN SERPL BCP-MCNC: 3.7 G/DL (ref 3.5–5.2)
ALP SERPL-CCNC: 152 U/L (ref 55–135)
ALT SERPL W/O P-5'-P-CCNC: 24 U/L (ref 10–44)
ANION GAP SERPL CALC-SCNC: 11 MMOL/L (ref 8–16)
AST SERPL-CCNC: 17 U/L (ref 10–40)
BASOPHILS # BLD AUTO: 0.02 K/UL (ref 0–0.2)
BASOPHILS NFR BLD: 0.4 % (ref 0–1.9)
BILIRUB SERPL-MCNC: 0.4 MG/DL (ref 0.1–1)
BUN SERPL-MCNC: 17 MG/DL (ref 6–20)
CALCIUM SERPL-MCNC: 9.6 MG/DL (ref 8.7–10.5)
CHLORIDE SERPL-SCNC: 95 MMOL/L (ref 95–110)
CK SERPL-CCNC: 121 U/L (ref 20–180)
CO2 SERPL-SCNC: 31 MMOL/L (ref 23–29)
CREAT SERPL-MCNC: 1.1 MG/DL (ref 0.5–1.4)
CRP SERPL-MCNC: 1.5 MG/L (ref 0–8.2)
DIFFERENTIAL METHOD: ABNORMAL
EOSINOPHIL # BLD AUTO: 0.1 K/UL (ref 0–0.5)
EOSINOPHIL NFR BLD: 2.5 % (ref 0–8)
ERYTHROCYTE [DISTWIDTH] IN BLOOD BY AUTOMATED COUNT: 15.4 % (ref 11.5–14.5)
ERYTHROCYTE [SEDIMENTATION RATE] IN BLOOD BY WESTERGREN METHOD: 18 MM/HR (ref 0–36)
EST. GFR  (AFRICAN AMERICAN): >60 ML/MIN/1.73 M^2
EST. GFR  (NON AFRICAN AMERICAN): 54.7 ML/MIN/1.73 M^2
GLUCOSE SERPL-MCNC: 399 MG/DL (ref 70–110)
HCT VFR BLD AUTO: 37.8 % (ref 37–48.5)
HGB BLD-MCNC: 11.6 G/DL (ref 12–16)
IMM GRANULOCYTES # BLD AUTO: 0.01 K/UL (ref 0–0.04)
IMM GRANULOCYTES NFR BLD AUTO: 0.2 % (ref 0–0.5)
LYMPHOCYTES # BLD AUTO: 2.2 K/UL (ref 1–4.8)
LYMPHOCYTES NFR BLD: 44.4 % (ref 18–48)
MCH RBC QN AUTO: 23.5 PG (ref 27–31)
MCHC RBC AUTO-ENTMCNC: 30.7 G/DL (ref 32–36)
MCV RBC AUTO: 77 FL (ref 82–98)
MONOCYTES # BLD AUTO: 0.4 K/UL (ref 0.3–1)
MONOCYTES NFR BLD: 8.7 % (ref 4–15)
NEUTROPHILS # BLD AUTO: 2.1 K/UL (ref 1.8–7.7)
NEUTROPHILS NFR BLD: 43.8 % (ref 38–73)
NRBC BLD-RTO: 0 /100 WBC
PLATELET # BLD AUTO: 353 K/UL (ref 150–450)
PMV BLD AUTO: 9.2 FL (ref 9.2–12.9)
POTASSIUM SERPL-SCNC: 4.2 MMOL/L (ref 3.5–5.1)
PROT SERPL-MCNC: 7.4 G/DL (ref 6–8.4)
RBC # BLD AUTO: 4.94 M/UL (ref 4–5.4)
SODIUM SERPL-SCNC: 137 MMOL/L (ref 136–145)
WBC # BLD AUTO: 4.84 K/UL (ref 3.9–12.7)

## 2022-03-17 PROCEDURE — 3008F BODY MASS INDEX DOCD: CPT | Mod: CPTII,S$GLB,, | Performed by: INTERNAL MEDICINE

## 2022-03-17 PROCEDURE — 3075F SYST BP GE 130 - 139MM HG: CPT | Mod: CPTII,S$GLB,, | Performed by: INTERNAL MEDICINE

## 2022-03-17 PROCEDURE — 4010F ACE/ARB THERAPY RXD/TAKEN: CPT | Mod: CPTII,S$GLB,, | Performed by: INTERNAL MEDICINE

## 2022-03-17 PROCEDURE — 99999 PR PBB SHADOW E&M-EST. PATIENT-LVL III: ICD-10-PCS | Mod: PBBFAC,,, | Performed by: INTERNAL MEDICINE

## 2022-03-17 PROCEDURE — 99214 OFFICE O/P EST MOD 30 MIN: CPT | Mod: S$GLB,,, | Performed by: INTERNAL MEDICINE

## 2022-03-17 PROCEDURE — 82085 ASSAY OF ALDOLASE: CPT | Performed by: INTERNAL MEDICINE

## 2022-03-17 PROCEDURE — 80053 COMPREHEN METABOLIC PANEL: CPT | Performed by: INTERNAL MEDICINE

## 2022-03-17 PROCEDURE — 3078F DIAST BP <80 MM HG: CPT | Mod: CPTII,S$GLB,, | Performed by: INTERNAL MEDICINE

## 2022-03-17 PROCEDURE — 1159F MED LIST DOCD IN RCRD: CPT | Mod: CPTII,S$GLB,, | Performed by: INTERNAL MEDICINE

## 2022-03-17 PROCEDURE — 85652 RBC SED RATE AUTOMATED: CPT | Performed by: INTERNAL MEDICINE

## 2022-03-17 PROCEDURE — 82550 ASSAY OF CK (CPK): CPT | Performed by: INTERNAL MEDICINE

## 2022-03-17 PROCEDURE — 85025 COMPLETE CBC W/AUTO DIFF WBC: CPT | Performed by: INTERNAL MEDICINE

## 2022-03-17 PROCEDURE — 3008F PR BODY MASS INDEX (BMI) DOCUMENTED: ICD-10-PCS | Mod: CPTII,S$GLB,, | Performed by: INTERNAL MEDICINE

## 2022-03-17 PROCEDURE — 86140 C-REACTIVE PROTEIN: CPT | Performed by: INTERNAL MEDICINE

## 2022-03-17 PROCEDURE — 36415 COLL VENOUS BLD VENIPUNCTURE: CPT | Performed by: INTERNAL MEDICINE

## 2022-03-17 PROCEDURE — 1160F PR REVIEW ALL MEDS BY PRESCRIBER/CLIN PHARMACIST DOCUMENTED: ICD-10-PCS | Mod: CPTII,S$GLB,, | Performed by: INTERNAL MEDICINE

## 2022-03-17 PROCEDURE — 3075F PR MOST RECENT SYSTOLIC BLOOD PRESS GE 130-139MM HG: ICD-10-PCS | Mod: CPTII,S$GLB,, | Performed by: INTERNAL MEDICINE

## 2022-03-17 PROCEDURE — 4010F PR ACE/ARB THEARPY RXD/TAKEN: ICD-10-PCS | Mod: CPTII,S$GLB,, | Performed by: INTERNAL MEDICINE

## 2022-03-17 PROCEDURE — 99999 PR PBB SHADOW E&M-EST. PATIENT-LVL III: CPT | Mod: PBBFAC,,, | Performed by: INTERNAL MEDICINE

## 2022-03-17 PROCEDURE — 99214 PR OFFICE/OUTPT VISIT, EST, LEVL IV, 30-39 MIN: ICD-10-PCS | Mod: S$GLB,,, | Performed by: INTERNAL MEDICINE

## 2022-03-17 PROCEDURE — 1159F PR MEDICATION LIST DOCUMENTED IN MEDICAL RECORD: ICD-10-PCS | Mod: CPTII,S$GLB,, | Performed by: INTERNAL MEDICINE

## 2022-03-17 PROCEDURE — 3078F PR MOST RECENT DIASTOLIC BLOOD PRESSURE < 80 MM HG: ICD-10-PCS | Mod: CPTII,S$GLB,, | Performed by: INTERNAL MEDICINE

## 2022-03-17 PROCEDURE — 1160F RVW MEDS BY RX/DR IN RCRD: CPT | Mod: CPTII,S$GLB,, | Performed by: INTERNAL MEDICINE

## 2022-03-17 RX ORDER — GABAPENTIN 100 MG/1
CAPSULE ORAL
Qty: 60 CAPSULE | Refills: 5 | Status: SHIPPED | OUTPATIENT
Start: 2022-03-17 | End: 2022-08-15

## 2022-03-17 RX ORDER — DULOXETIN HYDROCHLORIDE 30 MG/1
CAPSULE, DELAYED RELEASE ORAL
Qty: 60 CAPSULE | Refills: 5 | Status: SHIPPED | OUTPATIENT
Start: 2022-03-17 | End: 2022-08-15

## 2022-03-17 RX ORDER — GABAPENTIN 100 MG/1
CAPSULE ORAL
Qty: 60 CAPSULE | Refills: 5 | Status: SHIPPED | OUTPATIENT
Start: 2022-03-17 | End: 2022-03-17

## 2022-03-17 RX ORDER — DULOXETIN HYDROCHLORIDE 30 MG/1
CAPSULE, DELAYED RELEASE ORAL
Qty: 60 CAPSULE | Refills: 5 | Status: SHIPPED | OUTPATIENT
Start: 2022-03-17 | End: 2022-03-17

## 2022-03-17 ASSESSMENT — ROUTINE ASSESSMENT OF PATIENT INDEX DATA (RAPID3)
MDHAQ FUNCTION SCORE: 1.2
WHEN YOU AWAKENED IN THE MORNING OVER THE LAST WEEK, PLEASE INDICATE THE AMOUNT OF TIME IT TAKES UNTIL YOU ARE AS LIMBER AS YOU WILL BE FOR THE DAY: 2
TOTAL RAPID3 SCORE: 8
AM STIFFNESS SCORE: 1, YES
PSYCHOLOGICAL DISTRESS SCORE: 2.2
PAIN SCORE: 10
PATIENT GLOBAL ASSESSMENT SCORE: 10
FATIGUE SCORE: 10

## 2022-03-17 NOTE — PROGRESS NOTES
Chief Complaint   Patient presents with    Disease Management       Patient with rheumatoid arthritis for a follow up    History of presenting illness    60 year old black female comes in with a new diagnosis of rheumatoid arthritis    She has joint pains for 2 years    Feet hurt  Hands hurt  Neck hurts    Elbows,shoulders,wrists can hurt some  Back can hurt some    Pain,swelling,stiffness+  EMS+    Right 2nd finger is stiff and doesn't bend    No skin rashes,malar rash,photosensitivity  No telangiectasias  No calcinosis     No patchy alopecia  No oral and nasal ulcers  No sicca symptoms   No pleurisy or any cardiopulmonary complaints  No dysphagia,diplopia and dysphonia and muscle weakness  No n/v/d/c  No acid reflux+  No raynaud's+  No digital ulcers     No cytopenias  No renal issues  No blood clots     No fever,chills,night sweats,weight loss and loss of appetite     No pregnancy losses    A PCP diagnosed her with rheumatoid arthritis  Sent her to rheumatology on napolean : got mtx : 4 pills weekly/folic acid  She has headaches and she cant tolerate it   She had a rash    We did the whole panel    CBC nml except for low MCV  High glucose 293  ESR,CRP nml    CCP neg  Pre dmard panel neg  Uric acid nml  ULISES,SSA neg  HLA B27 neg    Arthritis survey    Mild deg changes in the neck  Deg changes in the hands and feet and knees  CXR nml    Humira offered   She stopped it due to fear of side effects    She started taking turmeric    Then we gave ssz and plaquenil since she didn't want to try biologics     She didn't like it as well    She had ongoing pain and swelling in the hands and feet    She was in a study for eczema difelikefalin so we couldn't start the enbrel    She got covid in march  She had mild symptoms   Not hospitalised   Recovered     She then was on enbrel weekly     2/2021  Right shoulder > left shoulder hurts  Hands hurt  Arms hurt    This visit she had disease activity  So we gave  orencia    5/2021    She thinks orencia is working   Only complaint today  Right shoulder pain,we had injected this shoulder last time,the injection helped,but only lasted for a week or so,but yet to complete physical therapy  She thinks PT helps to loosen up but still hurts  Recent bronchitis,stopped orencia 2 weeks ago,will resume it today    Labs     5/2021    Hepatitis and tb neg    CRP nml  ESR nml  Nml Hb,white count and plts  Low MCV 76   CMP nml    Left hand xray  DIP and PIP joint spaces are preserved.  There is some subtle 2nd MCP joint space narrowing and marginal osteophytosis.  The remainder of the MCP joint spaces are preserved.  There are prominent subchondral cystic changes within the proximal carpal row without significant joint space narrowing.  No bone erosion or destruction.     Impression:     Proximal arthropathy with prominent subchondral cysts affecting the radiocarpal joint and MCP joints may reflect CPPD arthropathy although other etiologies are possible.       Left knee xray  Mild femorotibial DJD and joint space narrowing.  No significant subchondral sclerosis.  Tiny patellar marginal osteophytes.  No acute fracture, dislocation, or osseous destruction.  Enthesopathic change of the patella and tibia.  No suprapatellar effusion.  Scattered vascular calcification.      Right shoulder MRI      There is a high-grade mostly articular surface but also bursal surface tear of the supraspinatus tendon with severe tendinosis.  I suspect underlying calcific tendinosis, to correlate with plain film radiographs.     There is tendinosis of the infraspinatus tendon without a definite tear.     The teres minor muscle and tendon appear intact.     There is tendinosis and partial tear of the superior fibers of the subscapularis tendon with partial biceps tendon medial subluxation.     There is abnormal signal of the biceps tendon consistent with significant tendinosis in its intra-articular portion with  tenosynovitis.     The humeral head contour and cartilage appears intact.     The glenoid contour and cartilage appears intact.     The labrum is intact.     There is significant signal abnormality of the subacromial subdeltoid bursa with postcontrast enhancement suggestive of bursitis.     Moderate AC joint osseous hypertrophy.     Impression:     Subacromial subdeltoid bursitis.     Near complete tear of the supraspinatus tendon.     Significant tendinosis of the infraspinatus.     Partial tear of the superior fibers of the subscapularis tendon with medial subluxation of the biceps tendon.     Significant biceps tendinosis and tenosynovitis.    Shoulder xray-right    No fracture.  No malalignment.  Minimal spurring inferiorly about preserved glenohumeral articulation.  Preserved acromioclavicular articulation.  Mild degenerative irregularity about the tuberosity region.  No definite findings of calcific tendinitis.    10/2021    Left knee gives out on her  She is currently enrolled in a therapy program    Right shoulder continues to hurt  Physical therapy helps  In the past steroid injection didn't last long    Right hip -today woke up with pain    On orencia  On cymbalta 30 to 60 mg daily     11/2021    H/h 11.3/36.5  MCV 76  White count nml  plts nml  CMP nml  ESR,CRP nml    3/2022    Right outside of the hip feels sore  Arm muscle pain     On orencia  On cymbalta 30 to 60 mg daily     One night she woke up with an episode of thigh muscle pain which was intense and was associated with vomiting  It lasted for few hours  She stretched and walked and it resolved with time     Left knee doing ok  Right shoulder better     Past history : dm,htn    Family history : mom has arthritis     Social history : former smoker,quit 20 years ago        Review of Systems   Constitutional: Negative for activity change, appetite change, chills, diaphoresis, fatigue, fever and unexpected weight change.   HENT: Negative for  congestion, dental problem, drooling, ear discharge, ear pain, facial swelling, hearing loss, mouth sores, nosebleeds, postnasal drip, rhinorrhea, sinus pressure, sinus pain, sneezing, sore throat, tinnitus, trouble swallowing and voice change.    Eyes: Negative for photophobia, pain, discharge, redness, itching and visual disturbance.   Respiratory: Negative for apnea, cough, choking, chest tightness, shortness of breath, wheezing and stridor.    Cardiovascular: Negative for chest pain, palpitations and leg swelling.   Gastrointestinal: Negative for abdominal distention, abdominal pain, anal bleeding, blood in stool, constipation, diarrhea, nausea, rectal pain and vomiting.   Endocrine: Negative for cold intolerance, heat intolerance, polydipsia, polyphagia and polyuria.   Genitourinary: Negative for decreased urine volume, difficulty urinating, dysuria, enuresis, flank pain, frequency, genital sores, hematuria and urgency.   Musculoskeletal: Positive for arthralgias. Negative for back pain, gait problem, joint swelling, myalgias, neck pain and neck stiffness.   Skin: Negative for color change, pallor, rash and wound.   Allergic/Immunologic: Negative for environmental allergies, food allergies and immunocompromised state.   Neurological: Negative for dizziness, tremors, seizures, syncope, facial asymmetry, speech difficulty, weakness, light-headedness, numbness and headaches.   Hematological: Negative for adenopathy. Does not bruise/bleed easily.   Psychiatric/Behavioral: Negative for agitation, behavioral problems, confusion, decreased concentration, dysphoric mood, hallucinations, self-injury, sleep disturbance and suicidal ideas. The patient is not nervous/anxious and is not hyperactive.      Physical Exam  HOMUNCULUS UPDATED     Widespread pain index  Note the areas which the patient has had pain over the last week:                   Shoulder-girdle, left               Shoulder-girdle, right                          Upper arm left                       Upper arm right                         Lower arm left                       Lower arm right    Hip (buttock, trochanter) left  Hip (buttock, trochanter) right                           Upper leg, left                         Upper leg, right                           Lower leg, left                         Lower leg, right                                     Jaw, left                                   Jaw, right                                        Chest                                  Abdomen                               Upper back                              Lower back                                        Neck  Score will be from 0-19: 12/19                                         Symptom severity score  Fatigue 2  Waking Unrefreshed 2  Cognitive Symptoms 0   0 = no problem, 1=slight or mild problem 2= moderate; considerable problems often present and/or at a moderate level, 3 = severe, pervasive, continuous, life disturbing problem  For each of the 3 symptoms, indicate the level of severity over the past week using the Scale.  The symptom severity score is the sum of the severity of the 3 symptoms (fatigue, waking unrefreshed, and cognitive symptoms) plus the number of the following symptoms occurring during the previous 6 months:   Headaches 0  Pain or cramps in the lower abdomen 1  Depression 0  The final score is between 0 and 12 : 5/12                                        Criteria  Patient has fibromyalgia if the following 3 conditions are met:  1.  Widespread pain index greater than or equal to 7 and symptom severity score greater than or equal to 5 or widespread pain index between 3- 6, and symptom severity score greater than or equal to 9.    2.  Symptoms have been present in a similar level for at least 3 months  3.  The patient does not have a disorder that would otherwise sufficiently explain the pain        Physical Exam   Constitutional: She is  oriented to person, place, and time. No distress.   HENT:   Head: Normocephalic.   Mouth/Throat: Oropharynx is clear and moist.   Eyes: Pupils are equal, round, and reactive to light. Conjunctivae are normal. Right eye exhibits no discharge. Left eye exhibits no discharge. No scleral icterus.   Neck: No thyromegaly present.   Cardiovascular: Normal rate, regular rhythm and normal heart sounds.   Pulmonary/Chest: Effort normal and breath sounds normal. No stridor.   Abdominal: Soft. Bowel sounds are normal.   Musculoskeletal:         General: Normal range of motion.      Cervical back: Normal range of motion.   Lymphadenopathy:     She has no cervical adenopathy.   Neurological: She is alert and oriented to person, place, and time.   Skin: Skin is warm. No rash noted. She is not diaphoretic.   Psychiatric: Affect and judgment normal.       Assessment     61 year old black female with DM,HTN comes in with a new diagnosis of rheumatoid arthritis    She has joint pains for 2 years    Feet hurt  Hands hurt  Neck hurts    Elbows,shoulders,wrists can hurt some  Back can hurt some    Pain,swelling,stiffness+  EMS+    Right 2nd finger is stiff and doesn't bend    A PCP diagnosed her with rheumatoid arthritis  Sent her to rheumatology on napolean : got mtx : 4 pills weekly/folic acid  She has headaches and she cant tolerate it and has a rash with it     RF positive  CCP neg    She was on humira and she had some headaches   She saw ad on the tv and got scared of the side effects  She then tried ssz and plaquenil and that didn't work    Finally she agreed to start enbrel  She went on a drug trial for eczema and we couldn't give enbrel   She started enbrel later -didn't like the side effects    She also has   Right hip bursitis  Right shoulder tendinitis  Left knee OA  CPPD  RA      On orencia    On cymbalta 30 to 60 mg daily -for fibromyalgia    CDAI today 21    Patient doesn't feel well today because her muscles are sore and  hurting  The thigh muscles and shoulder muscles don't feel good  ? Myopathy        1. Rheumatoid arthritis involving both hands with positive rheumatoid factor    2. Spondylosis of cervical region without myelopathy or radiculopathy    3. Fibromyalgia    4. Bursitis of other bursa of right hip    5. Arthralgia of right shoulder region    6. Rotator cuff arthropathy of right shoulder            F/u problem     Plan    Stop statins and talk to PCP    CK,aldolase     Blood work today    Orencia to continue     Cymbalta 60 mg is ok  Gabapentin 100 bid  voltaren gel topical      Tylenol 650 mg prn use     Eczema -better     Vaccines : flu,pneumonia and shingles,covid UTD     Offered acqua therapy    Marisol was seen today for disease management.    Diagnoses and all orders for this visit:    Rheumatoid arthritis involving both hands with positive rheumatoid factor  -     CBC Auto Differential; Future  -     Comprehensive Metabolic Panel; Future  -     Sedimentation rate; Future  -     C-Reactive Protein; Future  -     CK; Future  -     Aldolase; Future  -     Ambulatory referral/consult to Physical/Occupational Therapy; Future    Spondylosis of cervical region without myelopathy or radiculopathy  -     Ambulatory referral/consult to Physical/Occupational Therapy; Future    Fibromyalgia  -     Ambulatory referral/consult to Physical/Occupational Therapy; Future    Bursitis of other bursa of right hip  -     Ambulatory referral/consult to Physical/Occupational Therapy; Future    Arthralgia of right shoulder region  -     Ambulatory referral/consult to Physical/Occupational Therapy; Future    Rotator cuff arthropathy of right shoulder  -     Ambulatory referral/consult to Physical/Occupational Therapy; Future    Other orders  -     Discontinue: gabapentin (NEURONTIN) 100 MG capsule; One to two capsules daily  -     Discontinue: DULoxetine (CYMBALTA) 30 MG capsule; One to two capsules daily  -     DULoxetine (CYMBALTA) 30 MG  capsule; two capsules daily  -     gabapentin (NEURONTIN) 100 MG capsule; One to two capsules daily        rtc in 3 months

## 2022-03-19 LAB — ALDOLASE SERPL-CCNC: 5.1 U/L (ref 1.2–7.6)

## 2022-03-24 ENCOUNTER — PATIENT MESSAGE (OUTPATIENT)
Dept: PHARMACY | Facility: CLINIC | Age: 62
End: 2022-03-24
Payer: MEDICARE

## 2022-03-30 ENCOUNTER — PATIENT MESSAGE (OUTPATIENT)
Dept: PHARMACY | Facility: CLINIC | Age: 62
End: 2022-03-30
Payer: MEDICARE

## 2022-04-04 ENCOUNTER — OFFICE VISIT (OUTPATIENT)
Dept: ENDOCRINOLOGY | Facility: CLINIC | Age: 62
End: 2022-04-04
Payer: MEDICARE

## 2022-04-04 VITALS
BODY MASS INDEX: 30.95 KG/M2 | DIASTOLIC BLOOD PRESSURE: 79 MMHG | HEART RATE: 77 BPM | WEIGHT: 186 LBS | SYSTOLIC BLOOD PRESSURE: 138 MMHG | TEMPERATURE: 99 F

## 2022-04-04 DIAGNOSIS — R73.09 HEMOGLOBIN A1C GREATER THAN 9%, INDICATING POOR DIABETIC CONTROL: ICD-10-CM

## 2022-04-04 DIAGNOSIS — M05.742 RHEUMATOID ARTHRITIS INVOLVING BOTH HANDS WITH POSITIVE RHEUMATOID FACTOR: ICD-10-CM

## 2022-04-04 DIAGNOSIS — M05.741 RHEUMATOID ARTHRITIS INVOLVING BOTH HANDS WITH POSITIVE RHEUMATOID FACTOR: ICD-10-CM

## 2022-04-04 DIAGNOSIS — Z79.4 UNCONTROLLED TYPE 2 DIABETES MELLITUS WITH HYPERGLYCEMIA, WITH LONG-TERM CURRENT USE OF INSULIN: Primary | ICD-10-CM

## 2022-04-04 DIAGNOSIS — E11.65 UNCONTROLLED TYPE 2 DIABETES MELLITUS WITH HYPERGLYCEMIA, WITH LONG-TERM CURRENT USE OF INSULIN: Primary | ICD-10-CM

## 2022-04-04 DIAGNOSIS — E66.09 CLASS 1 OBESITY DUE TO EXCESS CALORIES WITH SERIOUS COMORBIDITY AND BODY MASS INDEX (BMI) OF 30.0 TO 30.9 IN ADULT: ICD-10-CM

## 2022-04-04 PROBLEM — E66.811 CLASS 1 OBESITY DUE TO EXCESS CALORIES WITH SERIOUS COMORBIDITY AND BODY MASS INDEX (BMI) OF 30.0 TO 30.9 IN ADULT: Status: ACTIVE | Noted: 2022-04-04

## 2022-04-04 LAB — GLUCOSE SERPL-MCNC: 205 MG/DL (ref 70–110)

## 2022-04-04 PROCEDURE — 99204 OFFICE O/P NEW MOD 45 MIN: CPT | Mod: S$GLB,,, | Performed by: HOSPITALIST

## 2022-04-04 PROCEDURE — 1159F MED LIST DOCD IN RCRD: CPT | Mod: CPTII,S$GLB,, | Performed by: HOSPITALIST

## 2022-04-04 PROCEDURE — 3008F PR BODY MASS INDEX (BMI) DOCUMENTED: ICD-10-PCS | Mod: CPTII,S$GLB,, | Performed by: HOSPITALIST

## 2022-04-04 PROCEDURE — 3008F BODY MASS INDEX DOCD: CPT | Mod: CPTII,S$GLB,, | Performed by: HOSPITALIST

## 2022-04-04 PROCEDURE — 99999 PR PBB SHADOW E&M-EST. PATIENT-LVL V: CPT | Mod: PBBFAC,,, | Performed by: HOSPITALIST

## 2022-04-04 PROCEDURE — 82962 GLUCOSE BLOOD TEST: CPT | Mod: S$GLB,,, | Performed by: HOSPITALIST

## 2022-04-04 PROCEDURE — 3078F DIAST BP <80 MM HG: CPT | Mod: CPTII,S$GLB,, | Performed by: HOSPITALIST

## 2022-04-04 PROCEDURE — 3075F PR MOST RECENT SYSTOLIC BLOOD PRESS GE 130-139MM HG: ICD-10-PCS | Mod: CPTII,S$GLB,, | Performed by: HOSPITALIST

## 2022-04-04 PROCEDURE — 3075F SYST BP GE 130 - 139MM HG: CPT | Mod: CPTII,S$GLB,, | Performed by: HOSPITALIST

## 2022-04-04 PROCEDURE — 99999 PR PBB SHADOW E&M-EST. PATIENT-LVL V: ICD-10-PCS | Mod: PBBFAC,,, | Performed by: HOSPITALIST

## 2022-04-04 PROCEDURE — 99204 PR OFFICE/OUTPT VISIT, NEW, LEVL IV, 45-59 MIN: ICD-10-PCS | Mod: S$GLB,,, | Performed by: HOSPITALIST

## 2022-04-04 PROCEDURE — 4010F ACE/ARB THERAPY RXD/TAKEN: CPT | Mod: CPTII,S$GLB,, | Performed by: HOSPITALIST

## 2022-04-04 PROCEDURE — 4010F PR ACE/ARB THEARPY RXD/TAKEN: ICD-10-PCS | Mod: CPTII,S$GLB,, | Performed by: HOSPITALIST

## 2022-04-04 PROCEDURE — 1160F RVW MEDS BY RX/DR IN RCRD: CPT | Mod: CPTII,S$GLB,, | Performed by: HOSPITALIST

## 2022-04-04 PROCEDURE — 3078F PR MOST RECENT DIASTOLIC BLOOD PRESSURE < 80 MM HG: ICD-10-PCS | Mod: CPTII,S$GLB,, | Performed by: HOSPITALIST

## 2022-04-04 PROCEDURE — 1159F PR MEDICATION LIST DOCUMENTED IN MEDICAL RECORD: ICD-10-PCS | Mod: CPTII,S$GLB,, | Performed by: HOSPITALIST

## 2022-04-04 PROCEDURE — 82962 POCT GLUCOSE, HAND-HELD DEVICE: ICD-10-PCS | Mod: S$GLB,,, | Performed by: HOSPITALIST

## 2022-04-04 PROCEDURE — 1160F PR REVIEW ALL MEDS BY PRESCRIBER/CLIN PHARMACIST DOCUMENTED: ICD-10-PCS | Mod: CPTII,S$GLB,, | Performed by: HOSPITALIST

## 2022-04-04 RX ORDER — INSULIN PUMP SYRINGE, 3 ML
EACH MISCELLANEOUS
Qty: 1 EACH | Refills: 0 | Status: SHIPPED | OUTPATIENT
Start: 2022-04-04

## 2022-04-04 RX ORDER — INSULIN LISPRO 100 [IU]/ML
INJECTION, SOLUTION INTRAVENOUS; SUBCUTANEOUS
Qty: 45 ML | Refills: 6 | Status: SHIPPED | OUTPATIENT
Start: 2022-04-04 | End: 2022-12-05 | Stop reason: SDUPTHER

## 2022-04-04 RX ORDER — PEN NEEDLE, DIABETIC 31 GX5/16"
NEEDLE, DISPOSABLE MISCELLANEOUS
Qty: 200 EACH | Refills: 6 | Status: SHIPPED | OUTPATIENT
Start: 2022-04-04 | End: 2022-08-29 | Stop reason: SDUPTHER

## 2022-04-04 RX ORDER — INSULIN GLARGINE 100 [IU]/ML
60 INJECTION, SOLUTION SUBCUTANEOUS NIGHTLY
Qty: 45 ML | Refills: 6 | Status: SHIPPED | OUTPATIENT
Start: 2022-04-04 | End: 2022-12-05

## 2022-04-04 RX ORDER — LANCING DEVICE
EACH MISCELLANEOUS
Qty: 1 EACH | Refills: 0 | Status: SHIPPED | OUTPATIENT
Start: 2022-04-04

## 2022-04-04 RX ORDER — LANCETS
EACH MISCELLANEOUS
Qty: 200 EACH | Refills: 6 | Status: SHIPPED | OUTPATIENT
Start: 2022-04-04 | End: 2022-08-15

## 2022-04-04 RX ORDER — METFORMIN HYDROCHLORIDE 1000 MG/1
1000 TABLET ORAL 2 TIMES DAILY
COMMUNITY
Start: 2022-03-15 | End: 2022-04-04 | Stop reason: SDUPTHER

## 2022-04-04 RX ORDER — METFORMIN HYDROCHLORIDE 1000 MG/1
1000 TABLET ORAL 2 TIMES DAILY WITH MEALS
Qty: 180 TABLET | Refills: 3 | Status: SHIPPED | OUTPATIENT
Start: 2022-04-04 | End: 2022-12-05 | Stop reason: SDUPTHER

## 2022-04-04 NOTE — PROGRESS NOTES
Subjective:      Patient ID: Marisol Rosales is a 61 y.o. female presented to [facility] on 4/4/2022.  Chief Complaint:  Diabetes      History of Present Illness: Marisol Rosales is a 61 y.o. female here for type 2 diabetes  Other significant past medical history: RA (follow with rheum), HTN, Obesity      With regards to Diabetes Mellitus Type 2  Known diabetic complications: peripheral neuropathy  Diagnosed w/ DM: >15 years ago, on insulin for >8 years    Interval history: Here for diabetes, A1C>14, poor controlled.  Poor compliance with insulin.  Last A1c done 3/22 >14  In clinic glucose check: 205      Current meds:               Metformin 1000 mg twice a day   Humalog 10 units 3 times a day with meals   Lantus 60 units once a day  Misses medication doses - yes, forget humalog  Injection Technique: Good  Rotation of injection site: Yes   Previous meds:              Glipizide  Home glucose checks: checks 2x a day, Logs reviewed/Unavailable: oral recall: 200s-300s   Hypoglycemia: denies  Diet/Exercise:               Eating 2x meals per day               Snacking, craving sweets              Drink: water, coke zero  Weight trend: decreasing steadily  Diabetes Education: No  Diabetes Related Hospitalization:  No  Hx of pancreatitis, hx of thyroid cancer: No  Family history of diabetes: Yes, grandmother and brother  Occupation: not working, disabled     Eye exam current (within one year): yes, DR: no  Reports cuts or ulcers on feet: yes 4/4/2022, Denies    Statin: Taking  ACE/ARB: Taking    Diabetes Management Status: Reviewed     A1C Trend  No results found for: HGBA1C    No results found for: MICALBCREAT  No results found for: [id number]  No results found for: TTGIGA    No results found for: CPEPTIDE, GLUTAMICACID, ISLETCELLANT, FRUCTOSAMINE     Screening or Prevention Patient's value Goal Complete/Controlled?   Lipid profile Most Recent Lipid Panel Health Maintenance Topic Completion: Not Found Annually No    LDL control No results found for: LDLCALC Annually/Less than 100 mg/dl  No   Nephropathy screening No results found for: LABMICR  No results found for: PROTEINUA Annually No   Blood pressure BP Readings from Last 1 Encounters:   04/04/22 138/79    Less than 140/90 Yes   Dilated retinal exam Most Recent Eye Exam Date: Not Found Annually Yes   Foot exam   Most Recent Foot Exam Date: Not Found Annually Yes       Reviewed past surgical, medical, family, social history and updated as appropriate.    Review of Systems: see HPI above    Objective:   /79 (BP Location: Left arm)   Pulse 77   Temp 99 °F (37.2 °C) (Oral)   Wt 84.4 kg (186 lb)   BMI 30.95 kg/m²     Body mass index is 30.95 kg/m².  Vital signs reviewed    Physical Exam  Vitals and nursing note reviewed.   Constitutional:       General: She is not in acute distress.     Appearance: Normal appearance. She is well-developed. She is obese. She is not toxic-appearing.   Neck:      Thyroid: No thyromegaly.   Cardiovascular:      Heart sounds: Normal heart sounds.   Pulmonary:      Effort: Pulmonary effort is normal. No respiratory distress.   Musculoskeletal:         General: No deformity. Normal range of motion.      Cervical back: Normal range of motion.   Skin:     Findings: No bruising.   Neurological:      Mental Status: She is alert and oriented to person, place, and time.   Psychiatric:         Mood and Affect: Mood normal.       Lab Reviewed:   No results found for: HGBA1C        No results found for: CHOL, HDL, LDLCALC, TRIG, CHOLHDL    Lab Results   Component Value Date     03/17/2022    K 4.2 03/17/2022    CL 95 03/17/2022    CO2 31 (H) 03/17/2022     (H) 03/17/2022    BUN 17 03/17/2022    CREATININE 1.1 03/17/2022    CALCIUM 9.6 03/17/2022    PROT 7.4 03/17/2022    ALBUMIN 3.7 03/17/2022    BILITOT 0.4 03/17/2022    ALKPHOS 152 (H) 03/17/2022    AST 17 03/17/2022    ALT 24 03/17/2022    ANIONGAP 11 03/17/2022    ESTGFRAFRICA >60.0  "03/17/2022    EGFRNONAA 54.7 (A) 03/17/2022        Lab Results   Component Value Date    CALCIUM 9.6 03/17/2022    CALCIUM 9.5 10/05/2021    CALCIUM 9.7 05/19/2021    ALKPHOS 152 (H) 03/17/2022    ALKPHOS 122 10/05/2021    ALKPHOS 140 (H) 05/19/2021       Assessment     1. Uncontrolled type 2 diabetes mellitus with hyperglycemia, with long-term current use of insulin  POCT Glucose, Hand-Held Device    Ambulatory referral/consult to Diabetes Education    metFORMIN (GLUCOPHAGE) 1000 MG tablet    LANTUS SOLOSTAR U-100 INSULIN glargine 100 units/mL (3mL) SubQ pen    HUMALOG KWIKPEN INSULIN 100 unit/mL pen    BD ULTRA-FINE SHORT PEN NEEDLE 31 gauge x 5/16" Ndle    semaglutide (OZEMPIC) 0.25 mg or 0.5 mg(2 mg/1.5 mL) pen injector    lancing device Misc    lancets Misc    blood-glucose meter kit    blood sugar diagnostic Strp    Hemoglobin A1C    Basic Metabolic Panel   2. Hemoglobin A1C greater than 9%, indicating poor diabetic control     3. Class 1 obesity due to excess calories with serious comorbidity and body mass index (BMI) of 30.0 to 30.9 in adult     4. Rheumatoid arthritis involving both hands with positive rheumatoid factor          Plan     Uncontrolled type 2 diabetes mellitus with hyperglycemia, with long-term current use of insulin  - Diabetes is not at goal, given most current A1C, Goal A1C for patient is 7%  - Limit data/lack of glucose log, making adjustment of diabetes regiment very difficult   - Complicated by hyperglycemia,  poor compliance and dietary indiscretion  - Diabetic supplies/medications reviewed this visit to ensure continue refills and compliance  - Diabetes education referral: for education  - Advised patient to get periodic eye and feet exam.     Plan  - hyperglycemia:  Poorly-controlled diabetes, adjustment made:  Lantus 60 units nightly, Humalog 14 units with each meals  - start Ozempic 0.5mg once a week injection  - Continue meformin 1000mg twice a day with food  - Discussion with " patient about dietary modification, portion size control, decreasing carbohydrates intake  - Discussed proper insulin injection technique: Including rotation of injection sites, using new insulin needles  - Advised pt to check glucoses regularly, asked to filled glucose log and bring back for review at next office visit  - Clear written instruction given on AVS  - Follow up as scheduled with lab work prior    Class 1 obesity due to excess calories with serious comorbidity and body mass index (BMI) of 30.0 to 30.9 in adult  - Body mass index is 30.95 kg/m².  - dietary discussion as above  - continue to monitor weight  - GLP1    Rheumatoid arthritis involving both hands with positive rheumatoid factor  - continue management by Rheumatology, not on steroid      Advised patient to follow up with PCP for routine health maintenance care.   RTC in 2 months to re-evaluate      Daquan Isaac M.D.  Endocrinology  [facility]  4/4/2022      Disclaimer: This note has been generated in part with the use of voice-recognition software. There may be typographical errors that have been missed during proof-reading.

## 2022-04-04 NOTE — PATIENT INSTRUCTIONS
((PLEASE follow up with me regularly to get refills of your diabetes medications, or future refills will be rejected))     ----------------------------------------------------------  REGIMENT     __ Lantus 60 units nightly  __ Humalog 14 units with each meals    Ozempic 0.25mg once a week injection for 1 month, then increase to 0.5mg once a week after that    Continue meformin 1000mg twice a day with food    Goal for your blood sugar   In the morning, before breakfast: 100-140  Before going to bed: 100-140  Do not go to bed with glucose less than 100, have a small snack if lower than 100      Please check glucose 3 times a day (before breakfast, lunch, dinner and at bedtime).   You can keep track of the glucose with Glucose logs or any papers  Please filled out and bring back to next office visit for me to review  Document any (LOW BLOOD GLUCOSE) hypoglycemia  episode with date and time for me to review      Please make sure to get your dilated eyes examine once a year with your eye doctor.  Monitor your feet for any cuts or skin breakdown regularly, schedule a visit with your foot doctor/podiatrist yearly if you see one.      We will plan an in-clinic visit in 6-8 weeks, with labs prior to that appointment.    Contact information:    Daquan Isaac M.D  [facility] [address] [facility] [address], [address]  [place] [address]    Office:  [phone]  Fax:  [phone]     ----------------------------------------------------------

## 2022-04-04 NOTE — ASSESSMENT & PLAN NOTE
- Body mass index is 30.95 kg/m².  - dietary discussion as above  - continue to monitor weight  - GLP1

## 2022-04-04 NOTE — ASSESSMENT & PLAN NOTE
- Diabetes is not at goal, given most current A1C, Goal A1C for patient is 7%  - Limit data/lack of glucose log, making adjustment of diabetes regiment very difficult   - Complicated by hyperglycemia,  poor compliance and dietary indiscretion  - Diabetic supplies/medications reviewed this visit to ensure continue refills and compliance  - Diabetes education referral: for education  - Advised patient to get periodic eye and feet exam.     Plan  - hyperglycemia:  Poorly-controlled diabetes, adjustment made:  Lantus 60 units nightly, Humalog 14 units with each meals  - start Ozempic 0.5mg once a week injection  - Continue meformin 1000mg twice a day with food  - Discussion with patient about dietary modification, portion size control, decreasing carbohydrates intake  - Discussed proper insulin injection technique: Including rotation of injection sites, using new insulin needles  - Advised pt to check glucoses regularly, asked to filled glucose log and bring back for review at next office visit  - Clear written instruction given on AVS  - Follow up as scheduled with lab work prior

## 2022-04-06 ENCOUNTER — SPECIALTY PHARMACY (OUTPATIENT)
Dept: PHARMACY | Facility: CLINIC | Age: 62
End: 2022-04-06
Payer: MEDICARE

## 2022-04-06 NOTE — TELEPHONE ENCOUNTER
Specialty Pharmacy - Refill Coordination    Pt started Ozempic. NO DDI exist between Orencia and Ozempic      Specialty Medication Orders Linked to Encounter    Flowsheet Row Most Recent Value   Medication #1 abatacept (ORENCIA CLICKJECT) 125 mg/mL AtIn (Order#[id number], Rx#[id number])          Refill Questions - Documented Responses    Flowsheet Row Most Recent Value   Patient Availability and HIPAA Verification    Does patient want to proceed with activity? Yes   HIPAA/medical authority confirmed? Yes   Relationship to patient of person spoken to? Self   Refill Screening Questions    Changes to allergies? No   Changes to medications? Yes  [Pt started Ozempic. NO DDI exist between Orencia and Ozempic.]   New conditions since last clinic visit? No   Unplanned office visit, urgent care, ED, or hospital admission in the last 4 weeks? No   How does patient/caregiver feel medication is working? Good   Financial problems or insurance changes? No   How many doses of your specialty medications were missed in the last 4 weeks? 0   Would patient like to speak to a pharmacist? No   When does the patient need to receive the medication? 04/15/22   Refill Delivery Questions    How will the patient receive the medication? Pickup   When does the patient need to receive the medication? 04/15/22   Shipping Address Home   Address in [facility] confirmed and updated if neccessary? Yes   Expected Copay ($) 0   Is the patient able to afford the medication copay? Yes   Payment Method zero copay   Days supply of Refill 28   Supplies needed? No supplies needed   Refill activity completed? Yes   Refill activity plan Refill scheduled   Shipment/Pickup Date: 04/07/22          Current Outpatient Medications   Medication Sig    abatacept (ORENCIA CLICKJECT) 125 mg/mL AtIn Inject 125 mg into the skin every 7 days.    albuterol (PROVENTIL) 2.5 mg /3 mL (0.083 %) nebulizer solution     amLODIPine (NORVASC) 10 MG tablet     ascorbic  "acid, vitamin C, (VITAMIN C) 1000 MG tablet Take 1,000 mg by mouth once daily.    aspirin (ECOTRIN) 81 MG EC tablet Take 81 mg by mouth once daily.    atorvastatin (LIPITOR) 20 MG tablet     BD ULTRA-FINE SHORT PEN NEEDLE 31 gauge x 5/16" Ndle Use 1 needles with each insulin injection, 4 times a day, ICD10: 11.9    betamethasone dipropionate (DIPROLENE) 0.05 % cream     blood sugar diagnostic Strp One test strip use 3 times a day to check blood glucose,  ICD-10: E11.9, compatible with insurance/glucometer    blood-glucose meter kit One glucometer, use to check 3 times a day.   ICD-10: E11.9, Dispense machine covered by insurance    cetirizine (ZYRTEC) 10 MG tablet Take 10 mg by mouth once daily.    clobetasol 0.05% (TEMOVATE) 0.05 % Oint Apply topically 2 (two) times daily. To rash on hands for flares up to 2 weeks    diclofenac sodium (VOLTAREN) 1 % Gel Apply 2 g topically 4 (four) times daily.    DULoxetine (CYMBALTA) 30 MG capsule two capsules daily    ELDERBERRY FRUIT ORAL Take by mouth.    enalapril (VASOTEC) 20 MG tablet     fluticasone propionate (FLONASE) 50 mcg/actuation nasal spray     gabapentin (NEURONTIN) 100 MG capsule One to two capsules daily    HUMALOG KWIKPEN INSULIN 100 unit/mL pen Give 14 units before each meals, three times a day. Give 90 day supply    hydroCHLOROthiazide (HYDRODIURIL) 25 MG tablet     ketoconazole (NIZORAL) 2 % cream APPLY CREAM TOPICALLY TO AFFECTED AREA TWICE DAILY FOR 14 DAYS    lancets Misc One lancets use 3 times a day to check blood glucose, ICD-10: E11.9    lancing device Misc One device, used to check blood glucose, ICD-10: E11.9    LANTUS SOLOSTAR U-100 INSULIN glargine 100 units/mL (3mL) SubQ pen Inject 60 Units into the skin every evening. Give 90 day supply    metFORMIN (GLUCOPHAGE) 1000 MG tablet Take 1 tablet (1,000 mg total) by mouth 2 (two) times daily with meals.    nebulizer and compressor Nika USE TID UTD    omeprazole (PRILOSEC) 40 MG " "capsule     PROVENTIL HFA 90 mcg/actuation inhaler     semaglutide (OZEMPIC) 0.25 mg or 0.5 mg(2 mg/1.5 mL) pen injector Inject 0.25 mg into the skin every 7 days. Take 0.25 mg for 4 weeks, then increase to 0.5 mg weekly,  Give 90 day supply    triamcinolone acetonide 0.1% (KENALOG) 0.1 % cream Apply topically.   Last reviewed on 4/4/2022  5:44 PM by Daquan Isaac MD    Review of patient's allergies indicates:   Allergen Reactions    Codeine Other (See Comments)     "it makes me feel crazy"    Prednisone Palpitations     "it makes my heart beat fast. I can take the shot"    Last reviewed on  4/4/2022 5:44 PM by Dauqan Isaac      Tasks added this encounter   No tasks added.   Tasks due within next 3 months   No tasks due.     FERNANDO HERNANDEZ, PharmD  [facility]  [address] [place] [address]  Phone: [phone]  Fax: [phone]      "

## 2022-04-06 NOTE — TELEPHONE ENCOUNTER
Specialty Pharmacy - Refill Coordination    Specialty Medication Orders Linked to Encounter    Flowsheet Row Most Recent Value   Medication #1 abatacept (ORENCIA CLICKJECT) 125 mg/mL AtIn (Order#[id number], Rx#[id number])        Refill Questions - Documented Responses    Flowsheet Row Most Recent Value   Patient Availability and HIPAA Verification    Does patient want to proceed with activity? Unable to Reach        We have had multiple attempts to the patient and have been unsuccessful to reach the patient. We will stop reaching out to the patient but in the event that the patient needs the med and contacts us, we will communicate and begin dispensing for the patient. At your next visit with the patient, please review the importance of being in contact with our specialty pharmacy as a part of our care team.      Avila Torres, PharmD  [facility]  [address] [facility] [address]  Phone: [phone]  Fax: [phone]

## 2022-04-18 ENCOUNTER — PATIENT MESSAGE (OUTPATIENT)
Dept: ADMINISTRATIVE | Facility: OTHER | Age: 62
End: 2022-04-18
Payer: MEDICARE

## 2022-05-05 ENCOUNTER — TELEPHONE (OUTPATIENT)
Dept: ADMINISTRATIVE | Facility: HOSPITAL | Age: 62
End: 2022-05-05
Payer: MEDICARE

## 2022-05-06 ENCOUNTER — SPECIALTY PHARMACY (OUTPATIENT)
Dept: PHARMACY | Facility: CLINIC | Age: 62
End: 2022-05-06
Payer: MEDICARE

## 2022-05-06 NOTE — TELEPHONE ENCOUNTER
Specialty Pharmacy - Refill Coordination    Specialty Medication Orders Linked to Encounter    Flowsheet Row Most Recent Value   Medication #1 abatacept (ORENCIA CLICKJECT) 125 mg/mL AtIn (Order#[id number], Rx#[id number])        With Mela's review and approval, I counseled the patient on not DDIs between orencia and ozempic. Patient verbalized her understanding and did not have any questions or concerns for a pharmacist.     Refill Questions - Documented Responses    Flowsheet Row Most Recent Value   Patient Availability and HIPAA Verification    Does patient want to proceed with activity? Yes   HIPAA/medical authority confirmed? Yes   Relationship to patient of person spoken to? Self   Refill Screening Questions    Changes to allergies? No   Changes to medications? Yes  [ozempic]   New conditions since last clinic visit? No   Unplanned office visit, urgent care, ED, or hospital admission in the last 4 weeks? No   How does patient/caregiver feel medication is working? Good   Financial problems or insurance changes? No   How many doses of your specialty medications were missed in the last 4 weeks? 0   Would patient like to speak to a pharmacist? No   When does the patient need to receive the medication? 05/16/22   Refill Delivery Questions    How will the patient receive the medication? Pickup   When does the patient need to receive the medication? 05/16/22   Expected Copay ($) 0   Is the patient able to afford the medication copay? Yes   Payment Method zero copay   Days supply of Refill 28   Supplies needed? No supplies needed   Refill activity completed? Yes   Refill activity plan Refill scheduled   Shipment/Pickup Date: 05/09/22          Current Outpatient Medications   Medication Sig    abatacept (ORENCIA CLICKJECT) 125 mg/mL AtIn Inject 125 mg into the skin every 7 days.    albuterol (PROVENTIL) 2.5 mg /3 mL (0.083 %) nebulizer solution     amLODIPine (NORVASC) 10 MG tablet     ascorbic acid, vitamin C,  "(VITAMIN C) 1000 MG tablet Take 1,000 mg by mouth once daily.    aspirin (ECOTRIN) 81 MG EC tablet Take 81 mg by mouth once daily.    atorvastatin (LIPITOR) 20 MG tablet     BD ULTRA-FINE SHORT PEN NEEDLE 31 gauge x 5/16" Ndle Use 1 needles with each insulin injection, 4 times a day, ICD10: 11.9    betamethasone dipropionate (DIPROLENE) 0.05 % cream     blood sugar diagnostic Strp One test strip use 3 times a day to check blood glucose,  ICD-10: E11.9, compatible with insurance/glucometer    blood-glucose meter kit One glucometer, use to check 3 times a day.   ICD-10: E11.9, Dispense machine covered by insurance    cetirizine (ZYRTEC) 10 MG tablet Take 10 mg by mouth once daily.    clobetasol 0.05% (TEMOVATE) 0.05 % Oint Apply topically 2 (two) times daily. To rash on hands for flares up to 2 weeks    diclofenac sodium (VOLTAREN) 1 % Gel Apply 2 g topically 4 (four) times daily.    DULoxetine (CYMBALTA) 30 MG capsule two capsules daily    ELDERBERRY FRUIT ORAL Take by mouth.    enalapril (VASOTEC) 20 MG tablet     fluticasone propionate (FLONASE) 50 mcg/actuation nasal spray     gabapentin (NEURONTIN) 100 MG capsule One to two capsules daily    HUMALOG KWIKPEN INSULIN 100 unit/mL pen Give 14 units before each meals, three times a day. Give 90 day supply    hydroCHLOROthiazide (HYDRODIURIL) 25 MG tablet     ketoconazole (NIZORAL) 2 % cream APPLY CREAM TOPICALLY TO AFFECTED AREA TWICE DAILY FOR 14 DAYS    lancets Misc One lancets use 3 times a day to check blood glucose, ICD-10: E11.9    lancing device Misc One device, used to check blood glucose, ICD-10: E11.9    LANTUS SOLOSTAR U-100 INSULIN glargine 100 units/mL (3mL) SubQ pen Inject 60 Units into the skin every evening. Give 90 day supply    metFORMIN (GLUCOPHAGE) 1000 MG tablet Take 1 tablet (1,000 mg total) by mouth 2 (two) times daily with meals.    nebulizer and compressor Nika USE TID UTD    omeprazole (PRILOSEC) 40 MG capsule     " "PROVENTIL HFA 90 mcg/actuation inhaler     semaglutide (OZEMPIC) 0.25 mg or 0.5 mg(2 mg/1.5 mL) pen injector Inject 0.25 mg into the skin every 7 days. Take 0.25 mg for 4 weeks, then increase to 0.5 mg weekly,  Give 90 day supply    triamcinolone acetonide 0.1% (KENALOG) 0.1 % cream Apply topically.   Last reviewed on 4/4/2022  5:44 PM by Daquan Isaac MD    Review of patient's allergies indicates:   Allergen Reactions    Codeine Other (See Comments)     "it makes me feel crazy"    Prednisone Palpitations     "it makes my heart beat fast. I can take the shot"    Last reviewed on  4/4/2022 5:44 PM by Daquan Isaac    Interventions added this encounter   Closed: [facility] Patient Intervention - Drug safety: abatacept (ORENCIA CLICKJECT) 125 mg/mL AtIn     Tasks added this encounter   6/6/2022 - Refill Call (Auto Added)  5/10/2022 - Pickup Reminder   Tasks due within next 3 months   No tasks due.     Steph Gusman [facility]  [address] [place] [address]  Phone: [phone]  Fax: [phone]      "

## 2022-05-16 ENCOUNTER — LAB VISIT (OUTPATIENT)
Dept: LAB | Facility: HOSPITAL | Age: 62
End: 2022-05-16
Attending: HOSPITALIST
Payer: MEDICARE

## 2022-05-16 DIAGNOSIS — E11.65 UNCONTROLLED TYPE 2 DIABETES MELLITUS WITH HYPERGLYCEMIA, WITH LONG-TERM CURRENT USE OF INSULIN: ICD-10-CM

## 2022-05-16 DIAGNOSIS — Z79.4 UNCONTROLLED TYPE 2 DIABETES MELLITUS WITH HYPERGLYCEMIA, WITH LONG-TERM CURRENT USE OF INSULIN: ICD-10-CM

## 2022-05-16 LAB
ANION GAP SERPL CALC-SCNC: 8 MMOL/L (ref 8–16)
BUN SERPL-MCNC: 19 MG/DL (ref 6–20)
CALCIUM SERPL-MCNC: 9 MG/DL (ref 8.7–10.5)
CHLORIDE SERPL-SCNC: 104 MMOL/L (ref 95–110)
CO2 SERPL-SCNC: 28 MMOL/L (ref 23–29)
CREAT SERPL-MCNC: 0.8 MG/DL (ref 0.5–1.4)
EST. GFR  (AFRICAN AMERICAN): >60 ML/MIN/1.73 M^2
EST. GFR  (NON AFRICAN AMERICAN): >60 ML/MIN/1.73 M^2
ESTIMATED AVG GLUCOSE: 286 MG/DL (ref 68–131)
GLUCOSE SERPL-MCNC: 194 MG/DL (ref 70–110)
HBA1C MFR BLD: 11.6 % (ref 4–5.6)
POTASSIUM SERPL-SCNC: 3.9 MMOL/L (ref 3.5–5.1)
SODIUM SERPL-SCNC: 140 MMOL/L (ref 136–145)

## 2022-05-16 PROCEDURE — 80048 BASIC METABOLIC PNL TOTAL CA: CPT | Performed by: HOSPITALIST

## 2022-05-16 PROCEDURE — 36415 COLL VENOUS BLD VENIPUNCTURE: CPT | Performed by: HOSPITALIST

## 2022-05-16 PROCEDURE — 83036 HEMOGLOBIN GLYCOSYLATED A1C: CPT | Performed by: HOSPITALIST

## 2022-06-03 ENCOUNTER — SPECIALTY PHARMACY (OUTPATIENT)
Dept: PHARMACY | Facility: CLINIC | Age: 62
End: 2022-06-03
Payer: MEDICARE

## 2022-06-03 NOTE — TELEPHONE ENCOUNTER
Specialty Pharmacy - Clinical Reassessment    Specialty Medication Orders Linked to Encounter    Flowsheet Row Most Recent Value   Medication #1 abatacept (ORENCIA CLICKJECT) 125 mg/mL AtIn (Order#[id number], Rx#[id number])        Patient Diagnosis   M05.741, M05.742 - Rheumatoid arthritis involving both hands with positive rheumatoid factor    Specialty clinical pharmacist review completed for an annual review of reassessment. Reviewed the following areas: current med list, reports of adverse effects, adherence and progress towards therapeutic goals.    Recommendations: none at this time.    Tasks added this encounter   No tasks added.   Tasks due within next 3 months   6/6/2022 - Refill Call (Auto Added)     Samantha Piedra, PharmD  [facility]  [address] [facility] [address]  Phone: [phone]  Fax: [phone]

## 2022-06-07 ENCOUNTER — PATIENT MESSAGE (OUTPATIENT)
Dept: PHARMACY | Facility: CLINIC | Age: 62
End: 2022-06-07
Payer: MEDICARE

## 2022-06-10 ENCOUNTER — SPECIALTY PHARMACY (OUTPATIENT)
Dept: PHARMACY | Facility: CLINIC | Age: 62
End: 2022-06-10
Payer: MEDICARE

## 2022-06-10 NOTE — TELEPHONE ENCOUNTER
"Specialty Pharmacy - Refill Coordination    Specialty Medication Orders Linked to Encounter    Flowsheet Row Most Recent Value   Medication #1 abatacept (ORENCIA CLICKJECT) 125 mg/mL AtIn (Order#[id number], Rx#[id number])          Refill Questions - Documented Responses    Flowsheet Row Most Recent Value   Patient Availability and HIPAA Verification    Does patient want to proceed with activity? Yes   HIPAA/medical authority confirmed? Yes   Relationship to patient of person spoken to? Self   Refill Screening Questions    Changes to allergies? No   Changes to medications? No   Unplanned office visit, urgent care, ED, or hospital admission in the last 4 weeks? No   How does patient/caregiver feel medication is working? Good   Financial problems or insurance changes? No   How many doses of your specialty medications were missed in the last 4 weeks? 0   Would patient like to speak to a pharmacist? No   When does the patient need to receive the medication? 06/13/22   Refill Delivery Questions    How will the patient receive the medication? Pickup   When does the patient need to receive the medication? 06/13/22   Is the patient able to afford the medication copay? Yes   Payment Method zero copay   Days supply of Refill 28   Supplies needed? No supplies needed   Refill activity completed? Yes   Refill activity plan Refill scheduled   Shipment/Pickup Date: 06/13/22          Current Outpatient Medications   Medication Sig    abatacept (ORENCIA CLICKJECT) 125 mg/mL AtIn Inject 125 mg into the skin every 7 days.    albuterol (PROVENTIL) 2.5 mg /3 mL (0.083 %) nebulizer solution     amLODIPine (NORVASC) 10 MG tablet     ascorbic acid, vitamin C, (VITAMIN C) 1000 MG tablet Take 1,000 mg by mouth once daily.    aspirin (ECOTRIN) 81 MG EC tablet Take 81 mg by mouth once daily.    atorvastatin (LIPITOR) 20 MG tablet     BD ULTRA-FINE SHORT PEN NEEDLE 31 gauge x 5/16" Ndle Use 1 needles with each insulin injection, 4 times a " day, ICD10: 11.9    betamethasone dipropionate (DIPROLENE) 0.05 % cream     blood sugar diagnostic Strp One test strip use 3 times a day to check blood glucose,  ICD-10: E11.9, compatible with insurance/glucometer    blood-glucose meter kit One glucometer, use to check 3 times a day.   ICD-10: E11.9, Dispense machine covered by insurance    cetirizine (ZYRTEC) 10 MG tablet Take 10 mg by mouth once daily.    clobetasol 0.05% (TEMOVATE) 0.05 % Oint Apply topically 2 (two) times daily. To rash on hands for flares up to 2 weeks    diclofenac sodium (VOLTAREN) 1 % Gel Apply 2 g topically 4 (four) times daily.    DULoxetine (CYMBALTA) 30 MG capsule two capsules daily    ELDERBERRY FRUIT ORAL Take by mouth.    enalapril (VASOTEC) 20 MG tablet     fluticasone propionate (FLONASE) 50 mcg/actuation nasal spray     gabapentin (NEURONTIN) 100 MG capsule One to two capsules daily    HUMALOG KWIKPEN INSULIN 100 unit/mL pen Give 14 units before each meals, three times a day. Give 90 day supply    hydroCHLOROthiazide (HYDRODIURIL) 25 MG tablet     ketoconazole (NIZORAL) 2 % cream APPLY CREAM TOPICALLY TO AFFECTED AREA TWICE DAILY FOR 14 DAYS    lancets Misc One lancets use 3 times a day to check blood glucose, ICD-10: E11.9    lancing device Misc One device, used to check blood glucose, ICD-10: E11.9    LANTUS SOLOSTAR U-100 INSULIN glargine 100 units/mL (3mL) SubQ pen Inject 60 Units into the skin every evening. Give 90 day supply    metFORMIN (GLUCOPHAGE) 1000 MG tablet Take 1 tablet (1,000 mg total) by mouth 2 (two) times daily with meals.    nebulizer and compressor Nika USE TID UTD    omeprazole (PRILOSEC) 40 MG capsule     PROVENTIL HFA 90 mcg/actuation inhaler     semaglutide (OZEMPIC) 0.25 mg or 0.5 mg(2 mg/1.5 mL) pen injector Inject 0.25 mg into the skin every 7 days. Take 0.25 mg for 4 weeks, then increase to 0.5 mg weekly,  Give 90 day supply    triamcinolone acetonide 0.1% (KENALOG) 0.1 % cream  "Apply topically.   Last reviewed on 4/4/2022  5:44 PM by Daquan Isaac MD    Review of patient's allergies indicates:   Allergen Reactions    Codeine Other (See Comments)     "it makes me feel crazy"    Prednisone Palpitations     "it makes my heart beat fast. I can take the shot"    Last reviewed on  4/4/2022 5:44 PM by Daquan Isaac      Tasks added this encounter   7/4/2022 - Refill Call (Auto Added)  6/14/2022 - Pickup Reminder   Tasks due within next 3 months   No tasks due.     Andreea Abraham, PharmD  [facility]  [address] [place] [address]  Phone: [phone]  Fax: [phone]      "

## 2022-06-27 ENCOUNTER — OFFICE VISIT (OUTPATIENT)
Dept: ENDOCRINOLOGY | Facility: CLINIC | Age: 62
End: 2022-06-27
Payer: MEDICARE

## 2022-06-27 VITALS
TEMPERATURE: 98 F | DIASTOLIC BLOOD PRESSURE: 77 MMHG | BODY MASS INDEX: 29.15 KG/M2 | SYSTOLIC BLOOD PRESSURE: 150 MMHG | HEART RATE: 83 BPM | WEIGHT: 175.19 LBS

## 2022-06-27 DIAGNOSIS — E66.09 CLASS 1 OBESITY DUE TO EXCESS CALORIES WITH SERIOUS COMORBIDITY AND BODY MASS INDEX (BMI) OF 30.0 TO 30.9 IN ADULT: ICD-10-CM

## 2022-06-27 DIAGNOSIS — M05.741 RHEUMATOID ARTHRITIS INVOLVING BOTH HANDS WITH POSITIVE RHEUMATOID FACTOR: ICD-10-CM

## 2022-06-27 DIAGNOSIS — Z79.4 UNCONTROLLED TYPE 2 DIABETES MELLITUS WITH HYPERGLYCEMIA, WITH LONG-TERM CURRENT USE OF INSULIN: ICD-10-CM

## 2022-06-27 DIAGNOSIS — E11.65 UNCONTROLLED TYPE 2 DIABETES MELLITUS WITH HYPERGLYCEMIA, WITH LONG-TERM CURRENT USE OF INSULIN: ICD-10-CM

## 2022-06-27 DIAGNOSIS — M05.742 RHEUMATOID ARTHRITIS INVOLVING BOTH HANDS WITH POSITIVE RHEUMATOID FACTOR: ICD-10-CM

## 2022-06-27 PROCEDURE — 1159F MED LIST DOCD IN RCRD: CPT | Mod: CPTII,S$GLB,, | Performed by: HOSPITALIST

## 2022-06-27 PROCEDURE — 3046F HEMOGLOBIN A1C LEVEL >9.0%: CPT | Mod: CPTII,S$GLB,, | Performed by: HOSPITALIST

## 2022-06-27 PROCEDURE — 3008F PR BODY MASS INDEX (BMI) DOCUMENTED: ICD-10-PCS | Mod: CPTII,S$GLB,, | Performed by: HOSPITALIST

## 2022-06-27 PROCEDURE — 1159F PR MEDICATION LIST DOCUMENTED IN MEDICAL RECORD: ICD-10-PCS | Mod: CPTII,S$GLB,, | Performed by: HOSPITALIST

## 2022-06-27 PROCEDURE — 3077F PR MOST RECENT SYSTOLIC BLOOD PRESSURE >= 140 MM HG: ICD-10-PCS | Mod: CPTII,S$GLB,, | Performed by: HOSPITALIST

## 2022-06-27 PROCEDURE — 99214 OFFICE O/P EST MOD 30 MIN: CPT | Mod: S$GLB,,, | Performed by: HOSPITALIST

## 2022-06-27 PROCEDURE — 4010F ACE/ARB THERAPY RXD/TAKEN: CPT | Mod: CPTII,S$GLB,, | Performed by: HOSPITALIST

## 2022-06-27 PROCEDURE — 3008F BODY MASS INDEX DOCD: CPT | Mod: CPTII,S$GLB,, | Performed by: HOSPITALIST

## 2022-06-27 PROCEDURE — 3046F PR MOST RECENT HEMOGLOBIN A1C LEVEL > 9.0%: ICD-10-PCS | Mod: CPTII,S$GLB,, | Performed by: HOSPITALIST

## 2022-06-27 PROCEDURE — 99999 PR PBB SHADOW E&M-EST. PATIENT-LVL V: CPT | Mod: PBBFAC,,, | Performed by: HOSPITALIST

## 2022-06-27 PROCEDURE — 4010F PR ACE/ARB THEARPY RXD/TAKEN: ICD-10-PCS | Mod: CPTII,S$GLB,, | Performed by: HOSPITALIST

## 2022-06-27 PROCEDURE — 3078F PR MOST RECENT DIASTOLIC BLOOD PRESSURE < 80 MM HG: ICD-10-PCS | Mod: CPTII,S$GLB,, | Performed by: HOSPITALIST

## 2022-06-27 PROCEDURE — 99214 PR OFFICE/OUTPT VISIT, EST, LEVL IV, 30-39 MIN: ICD-10-PCS | Mod: S$GLB,,, | Performed by: HOSPITALIST

## 2022-06-27 PROCEDURE — 3077F SYST BP >= 140 MM HG: CPT | Mod: CPTII,S$GLB,, | Performed by: HOSPITALIST

## 2022-06-27 PROCEDURE — 99999 PR PBB SHADOW E&M-EST. PATIENT-LVL V: ICD-10-PCS | Mod: PBBFAC,,, | Performed by: HOSPITALIST

## 2022-06-27 PROCEDURE — 3078F DIAST BP <80 MM HG: CPT | Mod: CPTII,S$GLB,, | Performed by: HOSPITALIST

## 2022-06-27 RX ORDER — SEMAGLUTIDE 1.34 MG/ML
1 INJECTION, SOLUTION SUBCUTANEOUS
Qty: 3 ML | Refills: 12 | Status: SHIPPED | OUTPATIENT
Start: 2022-06-27 | End: 2023-06-08 | Stop reason: SDUPTHER

## 2022-06-27 NOTE — ASSESSMENT & PLAN NOTE
- Diabetes is not at goal, given most current A1C improving, Goal A1C for patient is 7%  - Limit data/lack of glucose log, making adjustment of diabetes regiment very difficult   - Complicated by hyperglycemia  - Diabetic supplies/medications reviewed this visit to ensure continue refills and compliance  - Advised patient to get periodic eye and feet exam.     Plan  - adjustment may:  Lantus 50 units nightly, Humalog 10 units with each meals  - increased Ozempic 1.0mg once a week injection  - Continue meformin 1000mg twice a day with food  - Discussion with patient about CGM, not interested at this time.  - will perform professional CGM prior to next office visit, for glucose trend  - Advised pt to check glucoses regularly, asked to filled glucose log and bring back for review at next office visit  - Follow up as scheduled with lab work prior

## 2022-06-27 NOTE — PATIENT INSTRUCTIONS
((PLEASE follow up with me regularly to get refills of your diabetes medications, or future refills will be rejected))     ----------------------------------------------------------  REGIMENT     __ Lantus 50 units nightly   If glucose is less than 130 for 3 straight day>> can decrease to 45 units at night  __ Humalog 10 units with each meals  __ Ozempic 1.0mg once a week injection    Continue meformin 1000mg twice a day with food    Goal for your blood sugar   In the morning, before breakfast: 100-140  Before going to bed: 100-140  Do not go to bed with glucose less than 100, have a small snack if lower than 100      Please check glucose 3 times a day (before breakfast, lunch, dinner and at bedtime).   You can keep track of the glucose with Glucose logs or any papers  Please filled out and bring back to next office visit for me to review  Document any (LOW BLOOD GLUCOSE) hypoglycemia  episode with date and time for me to review      Please make sure to get your dilated eyes examine once a year with your eye doctor.  Monitor your feet for any cuts or skin breakdown regularly, schedule a visit with your foot doctor/podiatrist yearly if you see one.      We will plan an in-clinic visit in 8 weeks, with labs prior to that appointment.    Contact information:    Daquan Isaac M.D  [facility] [address] [facility], [address]  [place] [address]    Office:  [phone]  Fax:  [phone]     ----------------------------------------------------------

## 2022-06-27 NOTE — PROGRESS NOTES
Subjective:      Patient ID: Mraisol Rosales is a 61 y.o. female presented to [facility] on 6/27/2022.  Chief Complaint:  Diabetes      History of Present Illness: Marisol Rosales is a 61 y.o. female here for type 2 diabetes  Other significant past medical history: RA (follow with rheum), HTN, Obesity    With regards to Diabetes Mellitus Type 2  Known diabetic complications: peripheral neuropathy  Diagnosed w/ DM: >15 years ago, on insulin for >8 years      Interval history:  Here for follow-up type 2 diabetes.  Did not bring glucose log for review.  Data limited.  Compliance with insulin.  Weight loss noted.  Does skip Lantus on occasion due to concern for hypoglycemia.      Oral recall glucose:  Below  Report glucose 90-130s  220 this AM      Current meds:    Lantus 60 units nightly   Humalog 10 units with each meals   Ozempic 0.5mg once a week injection   meformin 1000mg twice a day with food   Misses medication doses - yes, forget humalog  Injection Technique: Good  Rotation of injection site: Yes   Previous meds:              Glipizide  Home glucose checks: checks 2x a day, Logs reviewed/Unavailable: oral recall: 200s-300s   Hypoglycemia: denies  Diet/Exercise:               Eating 2x meals per day               Snacking, craving sweets              Drink: water, coke zero  Weight trend: decreasing steadily  Diabetes Education: No  Diabetes Related Hospitalization:  No  Hx of pancreatitis, hx of thyroid cancer: No  Family history of diabetes: Yes, grandmother and brother  Occupation: not working, disabled     Eye exam current (within one year): yes, DR: no  Reports cuts or ulcers on feet: yes 4/4/2022, Denies    Statin: Taking  ACE/ARB: Taking    Diabetes Management Status: Reviewed     A1C Trend  Lab Results   Component Value Date    HGBA1C 11.6 (H) 05/16/2022       No results found for: MICALBCREAT  No results found for: JFNEGVGI05  No results found for: TTGIGA    No results found for: CPEPTIDE,  GLUTAMICACID, ISLETCELLANT, FRUCTOSAMINE     Screening or Prevention Patient's value Goal Complete/Controlled?   Lipid profile Most Recent Lipid Panel Health Maintenance Topic Completion: Not Found Annually No   LDL control No results found for: LDLCALC Annually/Less than 100 mg/dl  No   Nephropathy screening No results found for: LABMICR  No results found for: PROTEINUA Annually No   Blood pressure BP Readings from Last 1 Encounters:   06/27/22 (!) 150/77    Less than 140/90 Yes   Dilated retinal exam : 03/02/2022 Annually Yes   Foot exam   : 06/27/2022 Annually Yes       Reviewed past surgical, medical, family, social history and updated as appropriate.    Review of Systems: see HPI above    Objective:   BP (!) 150/77 (BP Location: Right arm)   Pulse 83   Temp 97.8 °F (36.6 °C) (Oral)   Wt 79.5 kg (175 lb 3.2 oz)   BMI 29.15 kg/m²     Body mass index is 29.15 kg/m².  Vital signs reviewed    Physical Exam  Vitals and nursing note reviewed.   Constitutional:       General: She is not in acute distress.     Appearance: Normal appearance. She is well-developed. She is obese. She is not toxic-appearing.   Neck:      Thyroid: No thyromegaly.   Cardiovascular:      Pulses:           Dorsalis pedis pulses are 1+ on the right side and 1+ on the left side.        Posterior tibial pulses are 1+ on the right side and 1+ on the left side.   Pulmonary:      Effort: Pulmonary effort is normal. No respiratory distress.   Musculoskeletal:         General: No deformity. Normal range of motion.      Cervical back: Normal range of motion.      Right foot: Normal range of motion. No deformity.      Left foot: Normal range of motion. No deformity.   Feet:      Right foot:      Protective Sensation: 5 sites tested. 5 sites sensed.      Skin integrity: Skin integrity normal. No ulcer or blister.      Toenail Condition: Right toenails are normal.      Left foot:      Protective Sensation: 5 sites tested. 5 sites sensed.      Skin  integrity: Skin integrity normal. No ulcer or blister.      Toenail Condition: Left toenails are normal.      Comments: No sign of diabetic neuropathy noted: able to detect vibration sensation on both feet for >8 seconds (with [place] Neuropathy test)  Neurological:      Mental Status: She is alert and oriented to person, place, and time.   Psychiatric:         Mood and Affect: Mood normal.       Lab Reviewed:   Lab Results   Component Value Date    HGBA1C 11.6 (H) 05/16/2022           No results found for: CHOL, HDL, LDLCALC, TRIG, CHOLHDL    Lab Results   Component Value Date     05/16/2022    K 3.9 05/16/2022     05/16/2022    CO2 28 05/16/2022     (H) 05/16/2022    BUN 19 05/16/2022    CREATININE 0.8 05/16/2022    CALCIUM 9.0 05/16/2022    PROT 7.4 03/17/2022    ALBUMIN 3.7 03/17/2022    BILITOT 0.4 03/17/2022    ALKPHOS 152 (H) 03/17/2022    AST 17 03/17/2022    ALT 24 03/17/2022    ANIONGAP 8 05/16/2022    ESTGFRAFRICA >60 05/16/2022    EGFRNONAA >60 05/16/2022        Lab Results   Component Value Date    CALCIUM 9.0 05/16/2022    CALCIUM 9.6 03/17/2022    CALCIUM 9.5 10/05/2021    ALKPHOS 152 (H) 03/17/2022    ALKPHOS 122 10/05/2021    ALKPHOS 140 (H) 05/19/2021       Assessment     1. Uncontrolled type 2 diabetes mellitus with hyperglycemia, with long-term current use of insulin  semaglutide (OZEMPIC) 1 mg/dose (4 mg/3 mL)    GLUCOSE MONITORING CONTINUOUS MIN 72 HOURS    Hemoglobin A1C    Microalbumin/Creatinine Ratio, Urine    Lipid Panel    Basic Metabolic Panel   2. Class 1 obesity due to excess calories with serious comorbidity and body mass index (BMI) of 30.0 to 30.9 in adult     3. Rheumatoid arthritis involving both hands with positive rheumatoid factor          Plan     Uncontrolled type 2 diabetes mellitus with hyperglycemia, with long-term current use of insulin  - Diabetes is not at goal, given most current A1C improving, Goal A1C for patient is 7%  - Limit data/lack of  glucose log, making adjustment of diabetes regiment very difficult   - Complicated by hyperglycemia  - Diabetic supplies/medications reviewed this visit to ensure continue refills and compliance  - Advised patient to get periodic eye and feet exam.     Plan  - adjustment may:  Lantus 50 units nightly, Humalog 10 units with each meals  - increased Ozempic 1.0mg once a week injection  - Continue meformin 1000mg twice a day with food  - Discussion with patient about CGM, not interested at this time.  - will perform professional CGM prior to next office visit, for glucose trend  - Advised pt to check glucoses regularly, asked to filled glucose log and bring back for review at next office visit  - Follow up as scheduled with lab work prior    Class 1 obesity due to excess calories with serious comorbidity and body mass index (BMI) of 30.0 to 30.9 in adult  - Body mass index is 29.15 kg/m².  - dietary discussion as above  - continue to monitor weight, weight loss noted  - increase Ozempic 1.0 mg once a week    Rheumatoid arthritis involving both hands with positive rheumatoid factor  - continue management by Rheumatology, not on steroid      Advised patient to follow up with PCP for routine health maintenance care.   RTC in 2 months to re-evaluate      Daquan Isaac M.D.  Endocrinology  [facility]  6/27/2022      Disclaimer: This note has been generated in part with the use of voice-recognition software. There may be typographical errors that have been missed during proof-reading.

## 2022-06-27 NOTE — ASSESSMENT & PLAN NOTE
- Body mass index is 29.15 kg/m².  - dietary discussion as above  - continue to monitor weight, weight loss noted  - increase Ozempic 1.0 mg once a week

## 2022-07-05 ENCOUNTER — SPECIALTY PHARMACY (OUTPATIENT)
Dept: PHARMACY | Facility: CLINIC | Age: 62
End: 2022-07-05
Payer: MEDICARE

## 2022-07-12 ENCOUNTER — PATIENT MESSAGE (OUTPATIENT)
Dept: PHARMACY | Facility: CLINIC | Age: 62
End: 2022-07-12
Payer: MEDICARE

## 2022-07-18 NOTE — TELEPHONE ENCOUNTER
Specialty Pharmacy - Refill Coordination    Specialty Medication Orders Linked to Encounter    Flowsheet Row Most Recent Value   Medication #1 abatacept (ORENCIA CLICKJECT) 125 mg/mL AtIn (Order#[id number], Rx#[id number])          Refill Questions - Documented Responses    Flowsheet Row Most Recent Value   Patient Availability and HIPAA Verification    Does patient want to proceed with activity? Yes   HIPAA/medical authority confirmed? Yes   Relationship to patient of person spoken to? Self   Refill Screening Questions    Changes to allergies? No   Changes to medications? No   New conditions since last clinic visit? No   Unplanned office visit, urgent care, ED, or hospital admission in the last 4 weeks? No   How does patient/caregiver feel medication is working? Very good   Financial problems or insurance changes? No   How many doses of your specialty medications were missed in the last 4 weeks? 0   Would patient like to speak to a pharmacist? No   When does the patient need to receive the medication? 07/18/22   Refill Delivery Questions    How will the patient receive the medication? Pickup   When does the patient need to receive the medication? 07/18/22   Expected Copay ($) 0   Is the patient able to afford the medication copay? Yes   Payment Method zero copay   Days supply of Refill 28   Supplies needed? --  [Injection Kit]   Refill activity completed? Yes   Refill activity plan Refill scheduled   Shipment/Pickup Date: 07/18/22          Current Outpatient Medications   Medication Sig    abatacept (ORENCIA CLICKJECT) 125 mg/mL AtIn Inject 125 mg into the skin every 7 days.    albuterol (PROVENTIL) 2.5 mg /3 mL (0.083 %) nebulizer solution     amLODIPine (NORVASC) 10 MG tablet     ascorbic acid, vitamin C, (VITAMIN C) 1000 MG tablet Take 1,000 mg by mouth once daily.    aspirin (ECOTRIN) 81 MG EC tablet Take 81 mg by mouth once daily.    atorvastatin (LIPITOR) 20 MG tablet     BD ULTRA-FINE SHORT PEN NEEDLE  "31 gauge x 5/16" Ndle Use 1 needles with each insulin injection, 4 times a day, ICD10: 11.9    betamethasone dipropionate (DIPROLENE) 0.05 % cream     blood sugar diagnostic Strp One test strip use 3 times a day to check blood glucose,  ICD-10: E11.9, compatible with insurance/glucometer    blood-glucose meter kit One glucometer, use to check 3 times a day.   ICD-10: E11.9, Dispense machine covered by insurance    cetirizine (ZYRTEC) 10 MG tablet Take 10 mg by mouth once daily.    clobetasol 0.05% (TEMOVATE) 0.05 % Oint Apply topically 2 (two) times daily. To rash on hands for flares up to 2 weeks    diclofenac sodium (VOLTAREN) 1 % Gel Apply 2 g topically 4 (four) times daily.    DULoxetine (CYMBALTA) 30 MG capsule two capsules daily    ELDERBERRY FRUIT ORAL Take by mouth.    enalapril (VASOTEC) 20 MG tablet     fluticasone propionate (FLONASE) 50 mcg/actuation nasal spray     gabapentin (NEURONTIN) 100 MG capsule One to two capsules daily    HUMALOG KWIKPEN INSULIN 100 unit/mL pen Give 14 units before each meals, three times a day. Give 90 day supply    hydroCHLOROthiazide (HYDRODIURIL) 25 MG tablet     ketoconazole (NIZORAL) 2 % cream APPLY CREAM TOPICALLY TO AFFECTED AREA TWICE DAILY FOR 14 DAYS    lancets Misc One lancets use 3 times a day to check blood glucose, ICD-10: E11.9    lancing device Misc One device, used to check blood glucose, ICD-10: E11.9    LANTUS SOLOSTAR U-100 INSULIN glargine 100 units/mL (3mL) SubQ pen Inject 60 Units into the skin every evening. Give 90 day supply    metFORMIN (GLUCOPHAGE) 1000 MG tablet Take 1 tablet (1,000 mg total) by mouth 2 (two) times daily with meals.    nebulizer and compressor Nika USE TID UTD    omeprazole (PRILOSEC) 40 MG capsule     PROVENTIL HFA 90 mcg/actuation inhaler     semaglutide (OZEMPIC) 1 mg/dose (4 mg/3 mL) Inject 1 mg into the skin every 7 days.    triamcinolone acetonide 0.1% (KENALOG) 0.1 % cream Apply topically.   Last " "reviewed on 6/27/2022  8:47 AM by Lashonda Paul MA    Review of patient's allergies indicates:   Allergen Reactions    Codeine Other (See Comments)     "it makes me feel crazy"    Prednisone Palpitations     "it makes my heart beat fast. I can take the shot"    Last reviewed on  6/27/2022 8:47 AM by Lashonda Paul      Tasks added this encounter   8/8/2022 - Refill Call (Auto Added)  7/19/2022 - Pickup Reminder   Tasks due within next 3 months   No tasks due.     Nikki Bryan, PharmD  [facility]  [address] [place] [address]  Phone: [phone]  Fax: [phone]      "

## 2022-08-08 ENCOUNTER — SPECIALTY PHARMACY (OUTPATIENT)
Dept: PHARMACY | Facility: CLINIC | Age: 62
End: 2022-08-08
Payer: MEDICARE

## 2022-08-08 NOTE — TELEPHONE ENCOUNTER
Outgoing call regarding Orencia refill. Pt stated she has 2 doses on hand. Pt has dose for 8/12 and has 1 dose for 81/9/22. Pt stated she didn't realize she had a dose on hand when we set up the last refill. Pt stated it was okay to follow up with her on 8/16/22 for refill.

## 2022-08-15 ENCOUNTER — OFFICE VISIT (OUTPATIENT)
Dept: RHEUMATOLOGY | Facility: CLINIC | Age: 62
End: 2022-08-15
Payer: MEDICARE

## 2022-08-15 ENCOUNTER — LAB VISIT (OUTPATIENT)
Dept: LAB | Facility: HOSPITAL | Age: 62
End: 2022-08-15
Attending: INTERNAL MEDICINE
Payer: MEDICARE

## 2022-08-15 VITALS
HEART RATE: 74 BPM | HEIGHT: 65 IN | SYSTOLIC BLOOD PRESSURE: 149 MMHG | DIASTOLIC BLOOD PRESSURE: 76 MMHG | WEIGHT: 171.94 LBS | TEMPERATURE: 99 F | BODY MASS INDEX: 28.65 KG/M2

## 2022-08-15 DIAGNOSIS — M05.742 RHEUMATOID ARTHRITIS INVOLVING BOTH HANDS WITH POSITIVE RHEUMATOID FACTOR: Primary | ICD-10-CM

## 2022-08-15 DIAGNOSIS — Z13.820 SCREENING FOR OSTEOPOROSIS: ICD-10-CM

## 2022-08-15 DIAGNOSIS — M05.741 RHEUMATOID ARTHRITIS INVOLVING BOTH HANDS WITH POSITIVE RHEUMATOID FACTOR: ICD-10-CM

## 2022-08-15 DIAGNOSIS — M05.741 RHEUMATOID ARTHRITIS INVOLVING BOTH HANDS WITH POSITIVE RHEUMATOID FACTOR: Primary | ICD-10-CM

## 2022-08-15 DIAGNOSIS — M05.742 RHEUMATOID ARTHRITIS INVOLVING BOTH HANDS WITH POSITIVE RHEUMATOID FACTOR: ICD-10-CM

## 2022-08-15 LAB
ALBUMIN SERPL BCP-MCNC: 3.7 G/DL (ref 3.5–5.2)
ALP SERPL-CCNC: 116 U/L (ref 55–135)
ALT SERPL W/O P-5'-P-CCNC: 18 U/L (ref 10–44)
ANION GAP SERPL CALC-SCNC: 9 MMOL/L (ref 8–16)
AST SERPL-CCNC: 15 U/L (ref 10–40)
BASOPHILS # BLD AUTO: 0.04 K/UL (ref 0–0.2)
BASOPHILS NFR BLD: 0.8 % (ref 0–1.9)
BILIRUB SERPL-MCNC: 0.3 MG/DL (ref 0.1–1)
BUN SERPL-MCNC: 13 MG/DL (ref 8–23)
CALCIUM SERPL-MCNC: 9.6 MG/DL (ref 8.7–10.5)
CHLORIDE SERPL-SCNC: 101 MMOL/L (ref 95–110)
CO2 SERPL-SCNC: 28 MMOL/L (ref 23–29)
CREAT SERPL-MCNC: 0.7 MG/DL (ref 0.5–1.4)
CRP SERPL-MCNC: 1.5 MG/L (ref 0–8.2)
DIFFERENTIAL METHOD: ABNORMAL
EOSINOPHIL # BLD AUTO: 0.1 K/UL (ref 0–0.5)
EOSINOPHIL NFR BLD: 2.3 % (ref 0–8)
ERYTHROCYTE [DISTWIDTH] IN BLOOD BY AUTOMATED COUNT: 15.7 % (ref 11.5–14.5)
ERYTHROCYTE [SEDIMENTATION RATE] IN BLOOD BY PHOTOMETRIC METHOD: 9 MM/HR (ref 0–36)
EST. GFR  (NO RACE VARIABLE): >60 ML/MIN/1.73 M^2
FERRITIN SERPL-MCNC: 59 NG/ML (ref 20–300)
GLUCOSE SERPL-MCNC: 172 MG/DL (ref 70–110)
HCT VFR BLD AUTO: 38.4 % (ref 37–48.5)
HGB BLD-MCNC: 11.9 G/DL (ref 12–16)
IMM GRANULOCYTES # BLD AUTO: 0.01 K/UL (ref 0–0.04)
IMM GRANULOCYTES NFR BLD AUTO: 0.2 % (ref 0–0.5)
IRON SERPL-MCNC: 34 UG/DL (ref 30–160)
LYMPHOCYTES # BLD AUTO: 2.5 K/UL (ref 1–4.8)
LYMPHOCYTES NFR BLD: 47.1 % (ref 18–48)
MCH RBC QN AUTO: 24.2 PG (ref 27–31)
MCHC RBC AUTO-ENTMCNC: 31 G/DL (ref 32–36)
MCV RBC AUTO: 78 FL (ref 82–98)
MONOCYTES # BLD AUTO: 0.4 K/UL (ref 0.3–1)
MONOCYTES NFR BLD: 7.3 % (ref 4–15)
NEUTROPHILS # BLD AUTO: 2.2 K/UL (ref 1.8–7.7)
NEUTROPHILS NFR BLD: 42.3 % (ref 38–73)
NRBC BLD-RTO: 0 /100 WBC
PLATELET # BLD AUTO: 344 K/UL (ref 150–450)
PMV BLD AUTO: 9.2 FL (ref 9.2–12.9)
POTASSIUM SERPL-SCNC: 4.2 MMOL/L (ref 3.5–5.1)
PROT SERPL-MCNC: 7.3 G/DL (ref 6–8.4)
RBC # BLD AUTO: 4.92 M/UL (ref 4–5.4)
SATURATED IRON: 7 % (ref 20–50)
SODIUM SERPL-SCNC: 138 MMOL/L (ref 136–145)
TOTAL IRON BINDING CAPACITY: 463 UG/DL (ref 250–450)
TRANSFERRIN SERPL-MCNC: 313 MG/DL (ref 200–375)
TSH SERPL DL<=0.005 MIU/L-ACNC: 1.52 UIU/ML (ref 0.4–4)
WBC # BLD AUTO: 5.2 K/UL (ref 3.9–12.7)

## 2022-08-15 PROCEDURE — 99214 OFFICE O/P EST MOD 30 MIN: CPT | Mod: S$GLB,,, | Performed by: INTERNAL MEDICINE

## 2022-08-15 PROCEDURE — 3046F PR MOST RECENT HEMOGLOBIN A1C LEVEL > 9.0%: ICD-10-PCS | Mod: CPTII,S$GLB,, | Performed by: INTERNAL MEDICINE

## 2022-08-15 PROCEDURE — 3078F DIAST BP <80 MM HG: CPT | Mod: CPTII,S$GLB,, | Performed by: INTERNAL MEDICINE

## 2022-08-15 PROCEDURE — 86140 C-REACTIVE PROTEIN: CPT | Performed by: INTERNAL MEDICINE

## 2022-08-15 PROCEDURE — 85652 RBC SED RATE AUTOMATED: CPT | Performed by: INTERNAL MEDICINE

## 2022-08-15 PROCEDURE — 1159F MED LIST DOCD IN RCRD: CPT | Mod: CPTII,S$GLB,, | Performed by: INTERNAL MEDICINE

## 2022-08-15 PROCEDURE — 1160F RVW MEDS BY RX/DR IN RCRD: CPT | Mod: CPTII,S$GLB,, | Performed by: INTERNAL MEDICINE

## 2022-08-15 PROCEDURE — 3078F PR MOST RECENT DIASTOLIC BLOOD PRESSURE < 80 MM HG: ICD-10-PCS | Mod: CPTII,S$GLB,, | Performed by: INTERNAL MEDICINE

## 2022-08-15 PROCEDURE — 36415 COLL VENOUS BLD VENIPUNCTURE: CPT | Performed by: INTERNAL MEDICINE

## 2022-08-15 PROCEDURE — 84466 ASSAY OF TRANSFERRIN: CPT | Performed by: INTERNAL MEDICINE

## 2022-08-15 PROCEDURE — 1160F PR REVIEW ALL MEDS BY PRESCRIBER/CLIN PHARMACIST DOCUMENTED: ICD-10-PCS | Mod: CPTII,S$GLB,, | Performed by: INTERNAL MEDICINE

## 2022-08-15 PROCEDURE — 3008F PR BODY MASS INDEX (BMI) DOCUMENTED: ICD-10-PCS | Mod: CPTII,S$GLB,, | Performed by: INTERNAL MEDICINE

## 2022-08-15 PROCEDURE — 80053 COMPREHEN METABOLIC PANEL: CPT | Performed by: INTERNAL MEDICINE

## 2022-08-15 PROCEDURE — 99999 PR PBB SHADOW E&M-EST. PATIENT-LVL III: ICD-10-PCS | Mod: PBBFAC,,, | Performed by: INTERNAL MEDICINE

## 2022-08-15 PROCEDURE — 85025 COMPLETE CBC W/AUTO DIFF WBC: CPT | Performed by: INTERNAL MEDICINE

## 2022-08-15 PROCEDURE — 4010F PR ACE/ARB THEARPY RXD/TAKEN: ICD-10-PCS | Mod: CPTII,S$GLB,, | Performed by: INTERNAL MEDICINE

## 2022-08-15 PROCEDURE — 99999 PR PBB SHADOW E&M-EST. PATIENT-LVL III: CPT | Mod: PBBFAC,,, | Performed by: INTERNAL MEDICINE

## 2022-08-15 PROCEDURE — 3077F PR MOST RECENT SYSTOLIC BLOOD PRESSURE >= 140 MM HG: ICD-10-PCS | Mod: CPTII,S$GLB,, | Performed by: INTERNAL MEDICINE

## 2022-08-15 PROCEDURE — 84443 ASSAY THYROID STIM HORMONE: CPT | Mod: GA | Performed by: INTERNAL MEDICINE

## 2022-08-15 PROCEDURE — 3046F HEMOGLOBIN A1C LEVEL >9.0%: CPT | Mod: CPTII,S$GLB,, | Performed by: INTERNAL MEDICINE

## 2022-08-15 PROCEDURE — 99214 PR OFFICE/OUTPT VISIT, EST, LEVL IV, 30-39 MIN: ICD-10-PCS | Mod: S$GLB,,, | Performed by: INTERNAL MEDICINE

## 2022-08-15 PROCEDURE — 3077F SYST BP >= 140 MM HG: CPT | Mod: CPTII,S$GLB,, | Performed by: INTERNAL MEDICINE

## 2022-08-15 PROCEDURE — 3008F BODY MASS INDEX DOCD: CPT | Mod: CPTII,S$GLB,, | Performed by: INTERNAL MEDICINE

## 2022-08-15 PROCEDURE — 4010F ACE/ARB THERAPY RXD/TAKEN: CPT | Mod: CPTII,S$GLB,, | Performed by: INTERNAL MEDICINE

## 2022-08-15 PROCEDURE — 82728 ASSAY OF FERRITIN: CPT | Mod: GA | Performed by: INTERNAL MEDICINE

## 2022-08-15 PROCEDURE — 1159F PR MEDICATION LIST DOCUMENTED IN MEDICAL RECORD: ICD-10-PCS | Mod: CPTII,S$GLB,, | Performed by: INTERNAL MEDICINE

## 2022-08-15 RX ORDER — LANCETS 33 GAUGE
EACH MISCELLANEOUS 2 TIMES DAILY
COMMUNITY
Start: 2022-08-04

## 2022-08-15 RX ORDER — GABAPENTIN 100 MG/1
CAPSULE ORAL
Qty: 60 CAPSULE | Refills: 5 | Status: SHIPPED | OUTPATIENT
Start: 2022-08-15 | End: 2023-04-19

## 2022-08-15 RX ORDER — DULOXETIN HYDROCHLORIDE 30 MG/1
CAPSULE, DELAYED RELEASE ORAL
Qty: 60 CAPSULE | Refills: 5 | Status: SHIPPED | OUTPATIENT
Start: 2022-08-15 | End: 2023-04-19

## 2022-08-15 NOTE — TELEPHONE ENCOUNTER
"Specialty Pharmacy - Refill Coordination    Specialty Medication Orders Linked to Encounter    Flowsheet Row Most Recent Value   Medication #1 abatacept (ORENCIA CLICKJECT) 125 mg/mL AtIn (Order#[id number], Rx#[id number])          Refill Questions - Documented Responses    Flowsheet Row Most Recent Value   Patient Availability and HIPAA Verification    Does patient want to proceed with activity? Yes   HIPAA/medical authority confirmed? Yes   Relationship to patient of person spoken to? Self   Refill Screening Questions    Changes to allergies? No   Changes to medications? No   New conditions since last clinic visit? No   Unplanned office visit, urgent care, ED, or hospital admission in the last 4 weeks? No   How does patient/caregiver feel medication is working? Good   Financial problems or insurance changes? No   How many doses of your specialty medications were missed in the last 4 weeks? 0   Would patient like to speak to a pharmacist? No   When does the patient need to receive the medication? 08/19/22   Refill Delivery Questions    How will the patient receive the medication? Pickup   When does the patient need to receive the medication? 08/19/22   Expected Copay ($) 0   Is the patient able to afford the medication copay? Yes   Payment Method zero copay   Days supply of Refill 28   Supplies needed? No supplies needed   Refill activity completed? Yes   Refill activity plan Refill scheduled   Shipment/Pickup Date: 08/18/22          Current Outpatient Medications   Medication Sig    abatacept (ORENCIA CLICKJECT) 125 mg/mL AtIn Inject 125 mg into the skin every 7 days.    albuterol (PROVENTIL) 2.5 mg /3 mL (0.083 %) nebulizer solution     amLODIPine (NORVASC) 10 MG tablet     ascorbic acid, vitamin C, (VITAMIN C) 1000 MG tablet Take 1,000 mg by mouth once daily.    aspirin (ECOTRIN) 81 MG EC tablet Take 81 mg by mouth once daily.    BD ULTRA-FINE SHORT PEN NEEDLE 31 gauge x 5/16" Ndle Use 1 needles with each " insulin injection, 4 times a day, ICD10: 11.9    betamethasone dipropionate (DIPROLENE) 0.05 % cream     blood sugar diagnostic Strp One test strip use 3 times a day to check blood glucose,  ICD-10: E11.9, compatible with insurance/glucometer    blood-glucose meter kit One glucometer, use to check 3 times a day.   ICD-10: E11.9, Dispense machine covered by insurance    cetirizine (ZYRTEC) 10 MG tablet Take 10 mg by mouth once daily.    clobetasol 0.05% (TEMOVATE) 0.05 % Oint Apply topically 2 (two) times daily. To rash on hands for flares up to 2 weeks    cyanocobalamin, vitamin B-12, (VITAMIN B-12 ORAL) Take by mouth.    diclofenac sodium (VOLTAREN) 1 % Gel Apply 2 g topically 4 (four) times daily.    DULoxetine (CYMBALTA) 30 MG capsule two capsules daily    ELDERBERRY FRUIT ORAL Take by mouth.    enalapril (VASOTEC) 20 MG tablet     fluticasone propionate (FLONASE) 50 mcg/actuation nasal spray     gabapentin (NEURONTIN) 100 MG capsule One to two capsules daily    HUMALOG KWIKPEN INSULIN 100 unit/mL pen Give 14 units before each meals, three times a day. Give 90 day supply    hydroCHLOROthiazide (HYDRODIURIL) 25 MG tablet     ketoconazole (NIZORAL) 2 % cream APPLY CREAM TOPICALLY TO AFFECTED AREA TWICE DAILY FOR 14 DAYS    lancing device Misc One device, used to check blood glucose, ICD-10: E11.9    LANTUS SOLOSTAR U-100 INSULIN glargine 100 units/mL (3mL) SubQ pen Inject 60 Units into the skin every evening. Give 90 day supply    metFORMIN (GLUCOPHAGE) 1000 MG tablet Take 1 tablet (1,000 mg total) by mouth 2 (two) times daily with meals.    nebulizer and compressor Nika USE TID UTD    omeprazole (PRILOSEC) 40 MG capsule     PROVENTIL HFA 90 mcg/actuation inhaler     semaglutide (OZEMPIC) 1 mg/dose (4 mg/3 mL) Inject 1 mg into the skin every 7 days.    triamcinolone acetonide 0.1% (KENALOG) 0.1 % cream Apply topically.    TRUEPLUS LANCETS 33 gauge Misc Apply topically 2 (two) times daily.  "  Last reviewed on 8/15/2022  9:41 AM by Curly Esparza MD    Review of patient's allergies indicates:   Allergen Reactions    Codeine Other (See Comments)     "it makes me feel crazy"    Prednisone Palpitations     "it makes my heart beat fast. I can take the shot"    Last reviewed on  8/15/2022 8:52 AM by Corinne Cundiff      Tasks added this encounter   9/9/2022 - Refill Call (Auto Added)  8/19/2022 - Pickup Reminder   Tasks due within next 3 months   No tasks due.     Flavia Gusman [facility]  [address] [place] [id number]  Phone: [phone]  Fax: [phone]      "

## 2022-08-15 NOTE — PROGRESS NOTES
Chief Complaint   Patient presents with    Disease Management       Patient with rheumatoid arthritis for a follow up    History of presenting illness    62 year old black female comes in with a new diagnosis of rheumatoid arthritis    She has joint pains for 2 years    Feet hurt  Hands hurt  Neck hurts    Elbows,shoulders,wrists can hurt some  Back can hurt some    Pain,swelling,stiffness+  EMS+    Right 2nd finger is stiff and doesn't bend    No skin rashes,malar rash,photosensitivity  No telangiectasias  No calcinosis     No patchy alopecia  No oral and nasal ulcers  No sicca symptoms   No pleurisy or any cardiopulmonary complaints  No dysphagia,diplopia and dysphonia and muscle weakness  No n/v/d/c  No acid reflux+  No raynaud's+  No digital ulcers     No cytopenias  No renal issues  No blood clots     No fever,chills,night sweats,weight loss and loss of appetite     No pregnancy losses    A PCP diagnosed her with rheumatoid arthritis  Sent her to rheumatology on napolean : got mtx : 4 pills weekly/folic acid  She has headaches and she cant tolerate it   She had a rash    We did the whole panel    CBC nml except for low MCV  High glucose 293  ESR,CRP nml    CCP neg  Pre dmard panel neg  Uric acid nml  ULISES,SSA neg  HLA B27 neg    Arthritis survey    Mild deg changes in the neck  Deg changes in the hands and feet and knees  CXR nml    Humira offered   She stopped it due to fear of side effects    She started taking turmeric    Then we gave ssz and plaquenil since she didn't want to try biologics     She didn't like it as well    She had ongoing pain and swelling in the hands and feet    She was in a study for eczema difelikefalin so we couldn't start the enbrel    She got covid in march  She had mild symptoms   Not hospitalised   Recovered     She then was on enbrel weekly     2/2021  Right shoulder > left shoulder hurts  Hands hurt  Arms hurt    This visit she had disease activity  So we gave  orencia    5/2021    She thinks orencia is working   Only complaint today  Right shoulder pain,we had injected this shoulder last time,the injection helped,but only lasted for a week or so,but yet to complete physical therapy  She thinks PT helps to loosen up but still hurts  Recent bronchitis,stopped orencia 2 weeks ago,will resume it today    Labs     5/2021    Hepatitis and tb neg    CRP nml  ESR nml  Nml Hb,white count and plts  Low MCV 76   CMP nml    Left hand xray  DIP and PIP joint spaces are preserved.  There is some subtle 2nd MCP joint space narrowing and marginal osteophytosis.  The remainder of the MCP joint spaces are preserved.  There are prominent subchondral cystic changes within the proximal carpal row without significant joint space narrowing.  No bone erosion or destruction.     Impression:     Proximal arthropathy with prominent subchondral cysts affecting the radiocarpal joint and MCP joints may reflect CPPD arthropathy although other etiologies are possible.       Left knee xray  Mild femorotibial DJD and joint space narrowing.  No significant subchondral sclerosis.  Tiny patellar marginal osteophytes.  No acute fracture, dislocation, or osseous destruction.  Enthesopathic change of the patella and tibia.  No suprapatellar effusion.  Scattered vascular calcification.      Right shoulder MRI      There is a high-grade mostly articular surface but also bursal surface tear of the supraspinatus tendon with severe tendinosis.  I suspect underlying calcific tendinosis, to correlate with plain film radiographs.     There is tendinosis of the infraspinatus tendon without a definite tear.     The teres minor muscle and tendon appear intact.     There is tendinosis and partial tear of the superior fibers of the subscapularis tendon with partial biceps tendon medial subluxation.     There is abnormal signal of the biceps tendon consistent with significant tendinosis in its intra-articular portion with  tenosynovitis.     The humeral head contour and cartilage appears intact.     The glenoid contour and cartilage appears intact.     The labrum is intact.     There is significant signal abnormality of the subacromial subdeltoid bursa with postcontrast enhancement suggestive of bursitis.     Moderate AC joint osseous hypertrophy.     Impression:     Subacromial subdeltoid bursitis.     Near complete tear of the supraspinatus tendon.     Significant tendinosis of the infraspinatus.     Partial tear of the superior fibers of the subscapularis tendon with medial subluxation of the biceps tendon.     Significant biceps tendinosis and tenosynovitis.    Shoulder xray-right    No fracture.  No malalignment.  Minimal spurring inferiorly about preserved glenohumeral articulation.  Preserved acromioclavicular articulation.  Mild degenerative irregularity about the tuberosity region.  No definite findings of calcific tendinitis.    10/2021    Left knee gives out on her  She is currently enrolled in a therapy program    Right shoulder continues to hurt  Physical therapy helps  In the past steroid injection didn't last long    Right hip -today woke up with pain    On orencia  On cymbalta 30 to 60 mg daily     11/2021    H/h 11.3/36.5  MCV 76  White count nml  plts nml  CMP nml  ESR,CRP nml    3/2022    Right outside of the hip feels sore  Arm muscle pain     On orencia  On cymbalta 30 to 60 mg daily     One night she woke up with an episode of thigh muscle pain which was intense and was associated with vomiting  It lasted for few hours  She stretched and walked and it resolved with time     Left knee doing ok  Right shoulder better     Ck,aldolase nml  ESR,CRP nml  GFR,LFTs nml  Glucose 300's  hba1c 11.6  H/h 11.6/37.8  nml white and plt count    8/2022    On orencia weekly    On cymbalta 30 to 60 mg daily  On gabapentin 100 mg x 2 daily    Off statins-muscle pain got better   She has not started anything else to replace     One  episode of [id number] fell forward on the right knee  She tried to catch herself and she has shoulder pain and arm pain,low back  Prior to the fall she was doing well  No fractures  No LOC    Low MCV  Mild anemia    On ozempic  Lost 20 pound weight loss    Past history : dm,htn    Family history : mom has arthritis     Social history : former smoker,quit 20 years ago        Review of Systems   Constitutional: Negative for activity change, appetite change, chills, diaphoresis, fatigue, fever and unexpected weight change.   HENT: Negative for congestion, dental problem, drooling, ear discharge, ear pain, facial swelling, hearing loss, mouth sores, nosebleeds, postnasal drip, rhinorrhea, sinus pressure, sinus pain, sneezing, sore throat, tinnitus, trouble swallowing and voice change.    Eyes: Negative for photophobia, pain, discharge, redness, itching and visual disturbance.   Respiratory: Negative for apnea, cough, choking, chest tightness, shortness of breath, wheezing and stridor.    Cardiovascular: Negative for chest pain, palpitations and leg swelling.   Gastrointestinal: Negative for abdominal distention, abdominal pain, anal bleeding, blood in stool, constipation, diarrhea, nausea, rectal pain and vomiting.   Endocrine: Negative for cold intolerance, heat intolerance, polydipsia, polyphagia and polyuria.   Genitourinary: Negative for decreased urine volume, difficulty urinating, dysuria, enuresis, flank pain, frequency, genital sores, hematuria and urgency.   Musculoskeletal: Positive for arthralgias. Negative for back pain, gait problem, joint swelling, myalgias, neck pain and neck stiffness.   Skin: Negative for color change, pallor, rash and wound.   Allergic/Immunologic: Negative for environmental allergies, food allergies and immunocompromised state.   Neurological: Negative for dizziness, tremors, seizures, syncope, facial asymmetry, speech difficulty, weakness, light-headedness, numbness and  headaches.   Hematological: Negative for adenopathy. Does not bruise/bleed easily.   Psychiatric/Behavioral: Negative for agitation, behavioral problems, confusion, decreased concentration, dysphoric mood, hallucinations, self-injury, sleep disturbance and suicidal ideas. The patient is not nervous/anxious and is not hyperactive.      Physical Exam  HOMUNCULUS UPDATED     Widespread pain index  Note the areas which the patient has had pain over the last week:                   Shoulder-girdle, left               Shoulder-girdle, right                         Upper arm left                       Upper arm right                         Lower arm left                       Lower arm right    Hip (buttock, trochanter) left  Hip (buttock, trochanter) right                           Upper leg, left                         Upper leg, right                           Lower leg, left                         Lower leg, right                                     Jaw, left                                   Jaw, right                                        Chest                                  Abdomen                               Upper back                              Lower back                                        Neck  Score will be from 0-19: 12/19                                         Symptom severity score  Fatigue 2  Waking Unrefreshed 2  Cognitive Symptoms 0   0 = no problem, 1=slight or mild problem 2= moderate; considerable problems often present and/or at a moderate level, 3 = severe, pervasive, continuous, life disturbing problem  For each of the 3 symptoms, indicate the level of severity over the past week using the Scale.  The symptom severity score is the sum of the severity of the 3 symptoms (fatigue, waking unrefreshed, and cognitive symptoms) plus the number of the following symptoms occurring during the previous 6 months:   Headaches 0  Pain or cramps in the lower abdomen 1  Depression 0  The final score  is between 0 and 12 : 5/12                                        Criteria  Patient has fibromyalgia if the following 3 conditions are met:  1.  Widespread pain index greater than or equal to 7 and symptom severity score greater than or equal to 5 or widespread pain index between 3- 6, and symptom severity score greater than or equal to 9.    2.  Symptoms have been present in a similar level for at least 3 months  3.  The patient does not have a disorder that would otherwise sufficiently explain the pain        Physical Exam   Constitutional: She is oriented to person, place, and time. No distress.   HENT:   Head: Normocephalic.   Mouth/Throat: Oropharynx is clear and moist.   Eyes: Pupils are equal, round, and reactive to light. Conjunctivae are normal. Right eye exhibits no discharge. Left eye exhibits no discharge. No scleral icterus.   Neck: No thyromegaly present.   Cardiovascular: Normal rate, regular rhythm and normal heart sounds.   Pulmonary/Chest: Effort normal and breath sounds normal. No stridor.   Abdominal: Soft. Bowel sounds are normal.   Musculoskeletal:         General: Normal range of motion.      Cervical back: Normal range of motion.   Lymphadenopathy:     She has no cervical adenopathy.   Neurological: She is alert and oriented to person, place, and time.   Skin: Skin is warm. No rash noted. She is not diaphoretic.   Psychiatric: Affect and judgment normal.       Assessment     62 year old black female with DM,HTN comes in with a new diagnosis of rheumatoid arthritis    She has joint pains for 2 years    Feet hurt  Hands hurt  Neck hurts    Elbows,shoulders,wrists can hurt some  Back can hurt some    Pain,swelling,stiffness+  EMS+    Right 2nd finger is stiff and doesn't bend    A PCP diagnosed her with rheumatoid arthritis  Sent her to rheumatology on napolean : got mtx : 4 pills weekly/folic acid  She has headaches and she cant tolerate it and has a rash with it     RF positive  CCP neg    She  was on humira and she had some headaches   She saw ad on the tv and got scared of the side effects  She then tried ssz and plaquenil and that didn't work    Finally she agreed to start enbrel  She went on a drug trial for eczema and we couldn't give enbrel   She started enbrel later -didn't like the side effects    She also has   Right hip bursitis  Right shoulder tendinitis  Left knee OA  CPPD  RA      On orencia    On cymbalta 30 to 60 mg daily -for fibromyalgia  gabapentin      8/2022    On orencia weekly    On cymbalta 30 to 60 mg daily  On gabapentin 100 mg x 2 daily    Off statins-muscle pain got better   She has not started anything else to replace     One episode of xwaq-[id number]-iyf fell forward on the right knee  She tried to catch herself and she has shoulder pain and arm pain,low back  Prior to the fall she was doing well  No fractures  No LOC    Low MCV  Mild anemia    On ozempic  Lost 20 pound weight loss  /76-only in the office visits she says    1. Rheumatoid arthritis involving both hands with positive rheumatoid factor    2. Screening for osteoporosis            F/u problem     Plan      Orencia to continue -weekly     Falls    I suspect the knee OA and knees giving out-is doing this  Knee PT will help  Knee brace -to see if that gives her some support  Also she has some leg length discrepancy-hence suggested heel lift    Pain management :  Cymbalta 60 mg is ok  Gabapentin 100 bid  voltaren gel topical      Tylenol 650 mg prn use     Eczema -better     Vaccines : flu,pneumonia and shingles,covid UTD     Offered acqua therapy-no appointments yet  Suggested knee therapy    Talk to PCP about replacing atorvastatin with a different drug since sugars are very high  Diabetes control    Anemia -iron studies and tsh    dexa ordered     Marisol was seen today for disease management.    Diagnoses and all orders for this visit:    Rheumatoid arthritis involving both hands with positive rheumatoid  factor  -     CBC Auto Differential; Future  -     Comprehensive Metabolic Panel; Future  -     Sedimentation rate; Future  -     C-Reactive Protein; Future  -     Iron and TIBC; Future  -     Ferritin; Future  -     TSH; Future    Screening for osteoporosis  -     DXA Bone Density Spine And Hip; Future    Other orders  -     gabapentin (NEURONTIN) 100 MG capsule; One to two capsules daily  -     DULoxetine (CYMBALTA) 30 MG capsule; two capsules daily        rtc in 3 months

## 2022-08-22 ENCOUNTER — CLINICAL SUPPORT (OUTPATIENT)
Dept: ENDOCRINOLOGY | Facility: CLINIC | Age: 62
End: 2022-08-22
Payer: MEDICARE

## 2022-08-22 ENCOUNTER — LAB VISIT (OUTPATIENT)
Dept: LAB | Facility: HOSPITAL | Age: 62
End: 2022-08-22
Attending: HOSPITALIST
Payer: MEDICARE

## 2022-08-22 DIAGNOSIS — E11.65 UNCONTROLLED TYPE 2 DIABETES MELLITUS WITH HYPERGLYCEMIA, WITH LONG-TERM CURRENT USE OF INSULIN: Primary | ICD-10-CM

## 2022-08-22 DIAGNOSIS — Z79.4 UNCONTROLLED TYPE 2 DIABETES MELLITUS WITH HYPERGLYCEMIA, WITH LONG-TERM CURRENT USE OF INSULIN: Primary | ICD-10-CM

## 2022-08-22 DIAGNOSIS — Z79.4 UNCONTROLLED TYPE 2 DIABETES MELLITUS WITH HYPERGLYCEMIA, WITH LONG-TERM CURRENT USE OF INSULIN: ICD-10-CM

## 2022-08-22 DIAGNOSIS — E11.65 UNCONTROLLED TYPE 2 DIABETES MELLITUS WITH HYPERGLYCEMIA, WITH LONG-TERM CURRENT USE OF INSULIN: ICD-10-CM

## 2022-08-22 LAB
ANION GAP SERPL CALC-SCNC: 9 MMOL/L (ref 8–16)
BUN SERPL-MCNC: 13 MG/DL (ref 8–23)
CALCIUM SERPL-MCNC: 8.9 MG/DL (ref 8.7–10.5)
CHLORIDE SERPL-SCNC: 104 MMOL/L (ref 95–110)
CHOLEST SERPL-MCNC: 195 MG/DL (ref 120–199)
CHOLEST/HDLC SERPL: 3.4 {RATIO} (ref 2–5)
CO2 SERPL-SCNC: 27 MMOL/L (ref 23–29)
CREAT SERPL-MCNC: 0.7 MG/DL (ref 0.5–1.4)
EST. GFR  (NO RACE VARIABLE): >60 ML/MIN/1.73 M^2
ESTIMATED AVG GLUCOSE: 255 MG/DL (ref 68–131)
GLUCOSE SERPL-MCNC: 202 MG/DL (ref 70–110)
HBA1C MFR BLD: 10.5 % (ref 4–5.6)
HDLC SERPL-MCNC: 57 MG/DL (ref 40–75)
HDLC SERPL: 29.2 % (ref 20–50)
LDLC SERPL CALC-MCNC: 124.2 MG/DL (ref 63–159)
NONHDLC SERPL-MCNC: 138 MG/DL
POTASSIUM SERPL-SCNC: 3.8 MMOL/L (ref 3.5–5.1)
SODIUM SERPL-SCNC: 140 MMOL/L (ref 136–145)
TRIGL SERPL-MCNC: 69 MG/DL (ref 30–150)

## 2022-08-22 PROCEDURE — 99499 UNLISTED E&M SERVICE: CPT | Mod: S$GLB,,, | Performed by: HOSPITALIST

## 2022-08-22 PROCEDURE — 80048 BASIC METABOLIC PNL TOTAL CA: CPT | Performed by: HOSPITALIST

## 2022-08-22 PROCEDURE — 80061 LIPID PANEL: CPT | Performed by: HOSPITALIST

## 2022-08-22 PROCEDURE — 83036 HEMOGLOBIN GLYCOSYLATED A1C: CPT | Performed by: HOSPITALIST

## 2022-08-22 PROCEDURE — 99499 NO LOS: ICD-10-PCS | Mod: S$GLB,,, | Performed by: HOSPITALIST

## 2022-08-22 PROCEDURE — 36415 COLL VENOUS BLD VENIPUNCTURE: CPT | Performed by: HOSPITALIST

## 2022-08-22 NOTE — PROGRESS NOTES
Patient is here today to participate in a Continuous Glucose Monitoring Study.  Patient will wear a Dexcom for 7 days in a blinded study.  Patient will be provided with a Dexcom sensor and transmitter and a copy of the Glucose Monitoring Patient Log to fill out during the study.  A detailed explanation of Continuous Glucose Monitoring was provided.   Instructed patient to record meals, drinks,  snacks, activity, and all diabetes medications on glucose log.  Site for insertion was selected and prepared with a sterile alcohol swab and allowed to dry. Glucose Sensor Serial Number [id number] was inserted to right lower quadrant.  Insertion of sensor done in clinic, individually, in private. Time: 15 minutes

## 2022-08-23 NOTE — PROGRESS NOTES
[facility] denied clobetasol oint (temovate). Case # [id number].   3 Mm Punch Excision Text: A 3 mm punch biopsy was used to excise the lesion to the level of the subcutaneous fat.  Blunt dissection was used to free the lesion from the surrounding tissues and the lesion was removed.

## 2022-08-29 ENCOUNTER — HOSPITAL ENCOUNTER (OUTPATIENT)
Dept: RADIOLOGY | Facility: CLINIC | Age: 62
Discharge: HOME | End: 2022-08-29
Attending: INTERNAL MEDICINE
Payer: MEDICARE

## 2022-08-29 ENCOUNTER — CLINICAL SUPPORT (OUTPATIENT)
Dept: ENDOCRINOLOGY | Facility: CLINIC | Age: 62
End: 2022-08-29
Payer: MEDICARE

## 2022-08-29 ENCOUNTER — OFFICE VISIT (OUTPATIENT)
Dept: ENDOCRINOLOGY | Facility: CLINIC | Age: 62
End: 2022-08-29
Payer: MEDICARE

## 2022-08-29 VITALS
BODY MASS INDEX: 28.89 KG/M2 | DIASTOLIC BLOOD PRESSURE: 74 MMHG | HEART RATE: 82 BPM | SYSTOLIC BLOOD PRESSURE: 128 MMHG | WEIGHT: 173.63 LBS | TEMPERATURE: 98 F

## 2022-08-29 DIAGNOSIS — Z13.820 SCREENING FOR OSTEOPOROSIS: ICD-10-CM

## 2022-08-29 DIAGNOSIS — E66.09 CLASS 1 OBESITY DUE TO EXCESS CALORIES WITH SERIOUS COMORBIDITY AND BODY MASS INDEX (BMI) OF 30.0 TO 30.9 IN ADULT: ICD-10-CM

## 2022-08-29 DIAGNOSIS — E11.65 UNCONTROLLED TYPE 2 DIABETES MELLITUS WITH HYPERGLYCEMIA, WITH LONG-TERM CURRENT USE OF INSULIN: Primary | ICD-10-CM

## 2022-08-29 DIAGNOSIS — E78.2 MIXED HYPERLIPIDEMIA: ICD-10-CM

## 2022-08-29 DIAGNOSIS — Z79.4 UNCONTROLLED TYPE 2 DIABETES MELLITUS WITH HYPERGLYCEMIA, WITH LONG-TERM CURRENT USE OF INSULIN: ICD-10-CM

## 2022-08-29 DIAGNOSIS — Z79.4 TYPE 2 DIABETES MELLITUS WITH HYPERGLYCEMIA, WITH LONG-TERM CURRENT USE OF INSULIN: Primary | ICD-10-CM

## 2022-08-29 DIAGNOSIS — Z79.4 UNCONTROLLED TYPE 2 DIABETES MELLITUS WITH HYPERGLYCEMIA, WITH LONG-TERM CURRENT USE OF INSULIN: Primary | ICD-10-CM

## 2022-08-29 DIAGNOSIS — E11.649 HYPOGLYCEMIA ASSOCIATED WITH TYPE 2 DIABETES MELLITUS: ICD-10-CM

## 2022-08-29 DIAGNOSIS — M05.741 RHEUMATOID ARTHRITIS INVOLVING BOTH HANDS WITH POSITIVE RHEUMATOID FACTOR: ICD-10-CM

## 2022-08-29 DIAGNOSIS — E11.65 UNCONTROLLED TYPE 2 DIABETES MELLITUS WITH HYPERGLYCEMIA, WITH LONG-TERM CURRENT USE OF INSULIN: ICD-10-CM

## 2022-08-29 DIAGNOSIS — E11.65 TYPE 2 DIABETES MELLITUS WITH HYPERGLYCEMIA, WITH LONG-TERM CURRENT USE OF INSULIN: Primary | ICD-10-CM

## 2022-08-29 DIAGNOSIS — M05.742 RHEUMATOID ARTHRITIS INVOLVING BOTH HANDS WITH POSITIVE RHEUMATOID FACTOR: ICD-10-CM

## 2022-08-29 PROCEDURE — 3061F NEG MICROALBUMINURIA REV: CPT | Mod: CPTII,S$GLB,, | Performed by: HOSPITALIST

## 2022-08-29 PROCEDURE — 3074F PR MOST RECENT SYSTOLIC BLOOD PRESSURE < 130 MM HG: ICD-10-PCS | Mod: CPTII,S$GLB,, | Performed by: HOSPITALIST

## 2022-08-29 PROCEDURE — 99499 UNLISTED E&M SERVICE: CPT | Mod: S$GLB,,, | Performed by: HOSPITALIST

## 2022-08-29 PROCEDURE — 1160F PR REVIEW ALL MEDS BY PRESCRIBER/CLIN PHARMACIST DOCUMENTED: ICD-10-PCS | Mod: CPTII,S$GLB,, | Performed by: HOSPITALIST

## 2022-08-29 PROCEDURE — 4010F PR ACE/ARB THEARPY RXD/TAKEN: ICD-10-PCS | Mod: CPTII,S$GLB,, | Performed by: HOSPITALIST

## 2022-08-29 PROCEDURE — 95251 PR GLUCOSE MONITOR, 72 HOUR, PHYS INTERP: ICD-10-PCS | Mod: S$GLB,,, | Performed by: HOSPITALIST

## 2022-08-29 PROCEDURE — 99499 NO LOS: ICD-10-PCS | Mod: S$GLB,,, | Performed by: HOSPITALIST

## 2022-08-29 PROCEDURE — 3078F PR MOST RECENT DIASTOLIC BLOOD PRESSURE < 80 MM HG: ICD-10-PCS | Mod: CPTII,S$GLB,, | Performed by: HOSPITALIST

## 2022-08-29 PROCEDURE — 99999 PR PBB SHADOW E&M-EST. PATIENT-LVL III: ICD-10-PCS | Mod: PBBFAC,,, | Performed by: HOSPITALIST

## 2022-08-29 PROCEDURE — 77080 DEXA BONE DENSITY SPINE HIP: ICD-10-PCS | Mod: 26,GA,S$GLB, | Performed by: INTERNAL MEDICINE

## 2022-08-29 PROCEDURE — 1159F MED LIST DOCD IN RCRD: CPT | Mod: CPTII,S$GLB,, | Performed by: HOSPITALIST

## 2022-08-29 PROCEDURE — 4010F ACE/ARB THERAPY RXD/TAKEN: CPT | Mod: CPTII,S$GLB,, | Performed by: HOSPITALIST

## 2022-08-29 PROCEDURE — 3074F SYST BP LT 130 MM HG: CPT | Mod: CPTII,S$GLB,, | Performed by: HOSPITALIST

## 2022-08-29 PROCEDURE — 95251 CONT GLUC MNTR ANALYSIS I&R: CPT | Mod: S$GLB,,, | Performed by: HOSPITALIST

## 2022-08-29 PROCEDURE — 77080 DXA BONE DENSITY AXIAL: CPT | Mod: GA,TC,PO

## 2022-08-29 PROCEDURE — 3078F DIAST BP <80 MM HG: CPT | Mod: CPTII,S$GLB,, | Performed by: HOSPITALIST

## 2022-08-29 PROCEDURE — 3066F NEPHROPATHY DOC TX: CPT | Mod: CPTII,S$GLB,, | Performed by: HOSPITALIST

## 2022-08-29 PROCEDURE — 99214 PR OFFICE/OUTPT VISIT, EST, LEVL IV, 30-39 MIN: ICD-10-PCS | Mod: S$GLB,,, | Performed by: HOSPITALIST

## 2022-08-29 PROCEDURE — 99999 PR PBB SHADOW E&M-EST. PATIENT-LVL III: CPT | Mod: PBBFAC,,, | Performed by: HOSPITALIST

## 2022-08-29 PROCEDURE — 3066F PR DOCUMENTATION OF TREATMENT FOR NEPHROPATHY: ICD-10-PCS | Mod: CPTII,S$GLB,, | Performed by: HOSPITALIST

## 2022-08-29 PROCEDURE — 3061F PR NEG MICROALBUMINURIA RESULT DOCUMENTED/REVIEW: ICD-10-PCS | Mod: CPTII,S$GLB,, | Performed by: HOSPITALIST

## 2022-08-29 PROCEDURE — 95250 PR GLUCOSE MONITORING,72 HRS,SUB-Q SENSOR: ICD-10-PCS | Mod: S$GLB,,, | Performed by: HOSPITALIST

## 2022-08-29 PROCEDURE — 3046F PR MOST RECENT HEMOGLOBIN A1C LEVEL > 9.0%: ICD-10-PCS | Mod: CPTII,S$GLB,, | Performed by: HOSPITALIST

## 2022-08-29 PROCEDURE — 77080 DXA BONE DENSITY AXIAL: CPT | Mod: 26,GA,S$GLB, | Performed by: INTERNAL MEDICINE

## 2022-08-29 PROCEDURE — 3008F PR BODY MASS INDEX (BMI) DOCUMENTED: ICD-10-PCS | Mod: CPTII,S$GLB,, | Performed by: HOSPITALIST

## 2022-08-29 PROCEDURE — 95250 CONT GLUC MNTR PHYS/QHP EQP: CPT | Mod: S$GLB,,, | Performed by: HOSPITALIST

## 2022-08-29 PROCEDURE — 1160F RVW MEDS BY RX/DR IN RCRD: CPT | Mod: CPTII,S$GLB,, | Performed by: HOSPITALIST

## 2022-08-29 PROCEDURE — 99214 OFFICE O/P EST MOD 30 MIN: CPT | Mod: S$GLB,,, | Performed by: HOSPITALIST

## 2022-08-29 PROCEDURE — 3046F HEMOGLOBIN A1C LEVEL >9.0%: CPT | Mod: CPTII,S$GLB,, | Performed by: HOSPITALIST

## 2022-08-29 PROCEDURE — 3008F BODY MASS INDEX DOCD: CPT | Mod: CPTII,S$GLB,, | Performed by: HOSPITALIST

## 2022-08-29 PROCEDURE — 1159F PR MEDICATION LIST DOCUMENTED IN MEDICAL RECORD: ICD-10-PCS | Mod: CPTII,S$GLB,, | Performed by: HOSPITALIST

## 2022-08-29 RX ORDER — PEN NEEDLE, DIABETIC 31 GX5/16"
NEEDLE, DISPOSABLE MISCELLANEOUS
Qty: 200 EACH | Refills: 6 | Status: SHIPPED | OUTPATIENT
Start: 2022-08-29 | End: 2022-12-05 | Stop reason: SDUPTHER

## 2022-08-29 RX ORDER — BLOOD-GLUCOSE SENSOR
EACH MISCELLANEOUS
Qty: 3 EACH | Refills: 11 | Status: SHIPPED | OUTPATIENT
Start: 2022-08-29

## 2022-08-29 RX ORDER — BLOOD-GLUCOSE,RECEIVER,CONT
EACH MISCELLANEOUS
Qty: 1 EACH | Refills: 0 | Status: SHIPPED | OUTPATIENT
Start: 2022-08-29

## 2022-08-29 RX ORDER — BLOOD-GLUCOSE TRANSMITTER
EACH MISCELLANEOUS
Qty: 1 EACH | Refills: 3 | Status: SHIPPED | OUTPATIENT
Start: 2022-08-29

## 2022-08-29 NOTE — PATIENT INSTRUCTIONS
((PLEASE follow up with me regularly to get refills of your diabetes medications, or future refills will be rejected))     ----------------------------------------------------------  REGIMENT     __ Lantus 45 units nightly, 8 PM   If glucose is less than 80 in the morning for 4 straight day>> can decrease to 40 units at night  __ Humalog 10 units with each meals for small meals   Only give 15 for big meals with carbohydrate    __ Ozempic 1.0mg once a week injection    Continue meformin 1000mg twice a day with food    Goal for your blood sugar   In the morning, before breakfast: 100-140  Before going to bed: 100-140  Do not go to bed with glucose less than 100, have a small snack if lower than 100    Please check glucose 3 times a day (before breakfast, lunch, dinner and at bedtime).   You can keep track of the glucose with Glucose logs or any papers  Please filled out and bring back to next office visit for me to review  Document any (LOW BLOOD GLUCOSE) hypoglycemia  episode with date and time for me to review      Contact information:    [facility] [address], [address]  [place] [address]    Office:  [phone]  Fax:  [phone]     ----------------------------------------------------------

## 2022-08-29 NOTE — PROGRESS NOTES
Subjective:      Patient ID: Marisol Rosales is a 62 y.o. female presented to [facility] on 8/29/2022.  Chief Complaint:  Diabetes      History of Present Illness: Marisol Rosales is a 62 y.o. female here for type 2 diabetes  Other significant past medical history: RA (follow with rheum), HTN, Obesity      With regards to Diabetes Mellitus Type 2  Known diabetic complications: peripheral neuropathy  Diagnosed w/ DM: >15 years ago, on insulin for >8 years      Interval history:  Here for follow-up type 2 diabetes.  Professional CGM completed.  Occasional hyperglycemia at night, patient endorses late dinner and overnight snack.  Inconsistent insulin administration reported.  High carbohydrate diet reported.  Did not bring diet log for review      CGM download and interpretation: Data was downloaded and personally reviewed by myself  CGM type:  Dexcom G6 pro  Number of Day CGM worn:   7/10 d, percentage of time CGM is active: 70%  Average blood glucose:  175, Glucose variability: 51 , Glucose management indicator (GMI):  7.5%  Time in Range:  11% very high, 26% High, 62% Target Range, <1% low, 0% very low>> reviewed and discussed, goal TIR discussed with patient    Patient with no obvious trend for hypoglycemia.  Does have late night hyperglycemia, predominantly after midnight to 4:00 a.m, due to snacking without given insulin.  Normal glycemia in daytime  Data and Recommendation discussed with patient in clinic today           Current meds:    Lantus 45 units nightly  Humalog 10 units with each meals  Ozempic 1.0mg once a week injection  meformin 1000mg twice a day with food     Misses medication doses - yes, forget humalog  Injection Technique: Good  Rotation of injection site: Yes   Previous meds:              Glipizide  Home glucose checks: checks 2x a day, Logs reviewed/Unavailable: oral recall: 200s-300s   Hypoglycemia: denies  Diet/Exercise:               Eating 2x meals per day               Snacking,  craving sweets              Drink: water, coke zero  Weight trend: decreasing steadily  Diabetes Education: No  Diabetes Related Hospitalization:  No  Hx of pancreatitis, hx of thyroid cancer: No  Family history of diabetes: Yes, grandmother and brother  Occupation: not working, disabled     Eye exam current (within one year): yes, DR: no  Reports cuts or ulcers on feet: yes 4/4/2022, Denies    Statin: Taking  ACE/ARB: Taking    Diabetes Management Status: Reviewed     A1C Trend  Lab Results   Component Value Date    HGBA1C 10.5 (H) 08/22/2022    HGBA1C 11.6 (H) 05/16/2022       Lab Results   Component Value Date    MICALBCREAT 9.6 08/22/2022     No results found for: VBGCUNGS83  No results found for: TTGIGA    No results found for: CPEPTIDE, GLUTAMICACID, ISLETCELLANT, FRUCTOSAMINE     Screening or Prevention Patient's value Goal Complete/Controlled?   Lipid profile : 08/22/2022 Annually No   LDL control Lab Results   Component Value Date    LDLCALC 124.2 08/22/2022    Annually/Less than 100 mg/dl  No   Nephropathy screening Lab Results   Component Value Date    LABMICR 5.0 08/22/2022     No results found for: PROTEINUA Annually No   Blood pressure BP Readings from Last 1 Encounters:   08/29/22 128/74    Less than 140/90 Yes   Dilated retinal exam : 03/02/2022 Annually Yes   Foot exam   : 06/27/2022 Annually Yes       Reviewed past surgical, medical, family, social history and updated as appropriate.  Review of Systems: see HPI above    Objective:   /74 (BP Location: Right arm)   Pulse 82   Temp 98.2 °F (36.8 °C) (Oral)   Wt 78.7 kg (173 lb 9.6 oz)   BMI 28.89 kg/m²     Body mass index is 28.89 kg/m².  Vital signs reviewed    Physical Exam  Vitals and nursing note reviewed.   Constitutional:       Appearance: Normal appearance. She is well-developed. She is not ill-appearing.   Neck:      Thyroid: No thyromegaly.   Pulmonary:      Effort: Pulmonary effort is normal. No respiratory distress.  "  Musculoskeletal:         General: Normal range of motion.      Cervical back: Normal range of motion.   Neurological:      General: No focal deficit present.      Mental Status: She is alert. Mental status is at baseline.   Psychiatric:         Mood and Affect: Mood normal.         Behavior: Behavior normal.     Lab Reviewed:   Lab Results   Component Value Date    HGBA1C 10.5 (H) 08/22/2022     Lab Results   Component Value Date    CHOL 195 08/22/2022    HDL 57 08/22/2022    LDLCALC 124.2 08/22/2022    TRIG 69 08/22/2022    CHOLHDL 29.2 08/22/2022       Lab Results   Component Value Date     08/22/2022    K 3.8 08/22/2022     08/22/2022    CO2 27 08/22/2022     (H) 08/22/2022    BUN 13 08/22/2022    CREATININE 0.7 08/22/2022    CALCIUM 8.9 08/22/2022    PROT 7.3 08/15/2022    ALBUMIN 3.7 08/15/2022    BILITOT 0.3 08/15/2022    ALKPHOS 116 08/15/2022    AST 15 08/15/2022    ALT 18 08/15/2022    ANIONGAP 9 08/22/2022    ESTGFRAFRICA >60 05/16/2022    EGFRNONAA >60 05/16/2022    TSH 1.524 08/15/2022        Lab Results   Component Value Date    CALCIUM 8.9 08/22/2022    CALCIUM 9.6 08/15/2022    CALCIUM 9.0 05/16/2022    ALKPHOS 116 08/15/2022    ALKPHOS 152 (H) 03/17/2022    ALKPHOS 122 10/05/2021    TSH 1.524 08/15/2022       Assessment     1. Type 2 diabetes mellitus with hyperglycemia, with long-term current use of insulin        2. Uncontrolled type 2 diabetes mellitus with hyperglycemia, with long-term current use of insulin  DEXCOM G6  Misc    DEXCOM G6 SENSOR Nika    DEXCOM G6 TRANSMITTER Nika    BD ULTRA-FINE SHORT PEN NEEDLE 31 gauge x 5/16" Ndle    Basic Metabolic Panel    Hemoglobin A1C    C-Peptide      3. Class 1 obesity due to excess calories with serious comorbidity and body mass index (BMI) of 30.0 to 30.9 in adult  Vitamin D      4. Rheumatoid arthritis involving both hands with positive rheumatoid factor        5. Hypoglycemia associated with type 2 diabetes mellitus  " DEXCOM G6  Misc    DEXCOM G6 SENSOR Nika    DEXCOM G6 TRANSMITTER Nika             Plan     Type 2 diabetes mellitus with hyperglycemia, with long-term current use of insulin  - Diabetes is not at goal, but with improvement given given most current A1C , Goal A1C for patient is 7%  - CGM: Dexcom G6 pro data downloaded, personal interpretation provided above and data was reviewed with patient in clinic this visit.  - Dexcom downloaded, show improvement in Time-in-range and GMI  - Complicated by hyperglycemia, dietary indiscretion, missed insulin dose  - rare hypoglycemia noted.  Due to too much insulin administration reported, not seen on Dexcom  - Diabetic supplies/medications reviewed this visit to ensure continue refills and compliance  - Advised patient to get periodic eye and feet exam.     Plan  - advise Carisa for better insulin compliance, missing Humalog doses at night.  - continue Lantus 45 units nightly, advised patient to given at 8:00 p.m. nightly  - continue Humalog 10 units to 15 units pending carbohydrates intake, 3 times a day before meals  - continue Ozempic 1.0mg once a week injection  - Continue meformin 1000mg twice a day with food  - discussed with patient about CGM, she is interested: Dexcom sent to insurance  - Advised pt to check glucoses regularly, asked to filled glucose log and bring back for review at next office visit  - Follow up as scheduled with lab work prior    Class 1 obesity due to excess calories with serious comorbidity and body mass index (BMI) of 30.0 to 30.9 in adult  - Body mass index is 28.89 kg/m².  - dietary discussion as above  - continue to monitor weight, weight loss noted  - continue Ozempic 1.0 mg once a week    Rheumatoid arthritis involving both hands with positive rheumatoid factor  - continue management by Rheumatology, not on steroid      Advised patient to follow up with PCP for routine health maintenance care.   RTC in 11/22 to re-evaluate    Daquan KAPADIA  MARCIO Isaac.  Endocrinology  [facility]  8/29/2022      Disclaimer: This note has been generated in part with the use of voice-recognition software. There may be typographical errors that have been missed during proof-reading.  -------------------------------------------------------------------------------------------------  Indications for CGMs:  Patent with diagnosed: Diabetes Mellitus that required frequent glucose monitoring (4 + times a day and 4x/day testing), frequent adjustments to insulin regiment based on BG or CGM results. Patent requires a therapeutic CGM and is willing to use therapeutic CGM/SMBG for Necessary frequent adjustments in insulin therapy, patient demonstrated an understanding of the technology (can use and recognize alerts and alarms). I, the physician, have assessed adherence to CGM regimen and to the plan, patient will have close follow up in clinic as indicated, every 3 months to 6 months.     Patient experiences (including but not limited to):  [x]   Discrepancies between records and A1C, at risk severe hypoglycemia episode and hospitalization  [x]   Recurrent, unexplained, severe hypoglycemic episodes  [x]   Postprandial hyperglycemia  [x]   Unexplained blood glucose excursions  []   Impaired awareness of hypoglycemia    []   Patient uses insulin pump for diabetes management: will need frequent adjustments to insulin regiment based on BG: including adjustment to  insulin pump setting, sliding scale, correction factor, additional bolusing/correction    I believe that the patient will greatly benefit from wearing CGM because this will allow for better glucose control, help to avoid high/lows and prevent hospitalization.  This also is safer for patient in using CGM during the COVID public health emergency.

## 2022-08-29 NOTE — ASSESSMENT & PLAN NOTE
- lipid panel review today  - ASCVD Risk below: Statin: Not taking  The 10-year ASCVD risk score (Yassine MARIE, et al., 2019) is: 12.5%    Values used to calculate the score:      Age: 62 years      Sex: Female      Is Non- : Yes      Diabetic: Yes      Tobacco smoker: No      Systolic Blood Pressure: 128 mmHg      Is BP treated: No      HDL Cholesterol: 57 mg/dL      Total Cholesterol: 195 mg/dL

## 2022-08-29 NOTE — ASSESSMENT & PLAN NOTE
- Diabetes is not at goal, but with improvement given given most current A1C , Goal A1C for patient is 7%  - CGM: Dexcom G6 pro data downloaded, personal interpretation provided above and data was reviewed with patient in clinic this visit.  - Dexcom downloaded, show improvement in Time-in-range and GMI  - Complicated by hyperglycemia, dietary indiscretion, missed insulin dose  - rare hypoglycemia noted.  Due to too much insulin administration reported, not seen on Dexcom  - Diabetic supplies/medications reviewed this visit to ensure continue refills and compliance  - Advised patient to get periodic eye and feet exam.     Plan  - advise Carisa for better insulin compliance, missing Humalog doses at night.  - continue Lantus 45 units nightly, advised patient to given at 8:00 p.m. nightly  - continue Humalog 10 units to 15 units pending carbohydrates intake, 3 times a day before meals  - continue Ozempic 1.0mg once a week injection  - Continue meformin 1000mg twice a day with food  - discussed with patient about CGM, she is interested: Dexcom sent to insurance  - Advised pt to check glucoses regularly, asked to filled glucose log and bring back for review at next office visit  - Follow up as scheduled with lab work prior

## 2022-08-29 NOTE — ASSESSMENT & PLAN NOTE
- Body mass index is 28.89 kg/m².  - dietary discussion as above  - continue to monitor weight, weight loss noted  - continue Ozempic 1.0 mg once a week

## 2022-09-09 ENCOUNTER — PATIENT MESSAGE (OUTPATIENT)
Dept: PHARMACY | Facility: CLINIC | Age: 62
End: 2022-09-09
Payer: MEDICARE

## 2022-09-12 ENCOUNTER — SPECIALTY PHARMACY (OUTPATIENT)
Dept: PHARMACY | Facility: CLINIC | Age: 62
End: 2022-09-12
Payer: MEDICARE

## 2022-09-12 NOTE — TELEPHONE ENCOUNTER
Spoke w pt regarding Orencia refill. Pt stated she has one injection on hand for Friday 9/16. Pt reported no missed or late doses. Pt requested a call next week and she will come and .

## 2022-09-19 NOTE — TELEPHONE ENCOUNTER
"Specialty Pharmacy - Refill Coordination    Specialty Medication Orders Linked to Encounter      Flowsheet Row Most Recent Value   Medication #1 abatacept (ORENCIA CLICKJECT) 125 mg/mL AtIn (Order#[id number], Rx#[id number])            Refill Questions - Documented Responses      Flowsheet Row Most Recent Value   Patient Availability and HIPAA Verification    Does patient want to proceed with activity? Yes   HIPAA/medical authority confirmed? Yes   Relationship to patient of person spoken to? Self   Refill Screening Questions    Changes to allergies? No   Changes to medications? No   New conditions since last clinic visit? No   Unplanned office visit, urgent care, ED, or hospital admission in the last 4 weeks? No   How does patient/caregiver feel medication is working? Very good   Financial problems or insurance changes? No   How many doses of your specialty medications were missed in the last 4 weeks? 0   Would patient like to speak to a pharmacist? No   When does the patient need to receive the medication? 09/23/22   Refill Delivery Questions    How will the patient receive the medication? Pickup   When does the patient need to receive the medication? 09/23/22   Expected Copay ($) 0   Is the patient able to afford the medication copay? Yes   Payment Method zero copay   Days supply of Refill 28   Supplies needed? No supplies needed   Refill activity completed? Yes   Refill activity plan Refill scheduled   Shipment/Pickup Date: 09/22/22            Current Outpatient Medications   Medication Sig    abatacept (ORENCIA CLICKJECT) 125 mg/mL AtIn Inject 125 mg into the skin every 7 days.    albuterol (PROVENTIL) 2.5 mg /3 mL (0.083 %) nebulizer solution     amLODIPine (NORVASC) 10 MG tablet     ascorbic acid, vitamin C, (VITAMIN C) 1000 MG tablet Take 1,000 mg by mouth once daily.    aspirin (ECOTRIN) 81 MG EC tablet Take 81 mg by mouth once daily.    BD ULTRA-FINE SHORT PEN NEEDLE 31 gauge x 5/16" Ndle Use 1 needles with " each insulin injection, 4 times a day, ICD10: 11.9    betamethasone dipropionate (DIPROLENE) 0.05 % cream     blood sugar diagnostic Strp One test strip use 3 times a day to check blood glucose,  ICD-10: E11.9, compatible with insurance/glucometer    blood-glucose meter kit One glucometer, use to check 3 times a day.   ICD-10: E11.9, Dispense machine covered by insurance    cetirizine (ZYRTEC) 10 MG tablet Take 10 mg by mouth once daily.    clobetasol 0.05% (TEMOVATE) 0.05 % Oint Apply topically 2 (two) times daily. To rash on hands for flares up to 2 weeks    cyanocobalamin, vitamin B-12, (VITAMIN B-12 ORAL) Take by mouth.    DEXCOM G6  Misc 1 , use as directed    DEXCOM G6 SENSOR Nika 1 sensor, change every 10 days    DEXCOM G6 TRANSMITTER Nika 1 Transmitter, use as directed    diclofenac sodium (VOLTAREN) 1 % Gel Apply 2 g topically 4 (four) times daily.    DULoxetine (CYMBALTA) 30 MG capsule two capsules daily    ELDERBERRY FRUIT ORAL Take by mouth.    enalapril (VASOTEC) 20 MG tablet     fluticasone propionate (FLONASE) 50 mcg/actuation nasal spray     gabapentin (NEURONTIN) 100 MG capsule One to two capsules daily    HUMALOG KWIKPEN INSULIN 100 unit/mL pen Give 14 units before each meals, three times a day. Give 90 day supply    hydroCHLOROthiazide (HYDRODIURIL) 25 MG tablet     ketoconazole (NIZORAL) 2 % cream APPLY CREAM TOPICALLY TO AFFECTED AREA TWICE DAILY FOR 14 DAYS    lancing device Misc One device, used to check blood glucose, ICD-10: E11.9    LANTUS SOLOSTAR U-100 INSULIN glargine 100 units/mL (3mL) SubQ pen Inject 60 Units into the skin every evening. Give 90 day supply    metFORMIN (GLUCOPHAGE) 1000 MG tablet Take 1 tablet (1,000 mg total) by mouth 2 (two) times daily with meals.    nebulizer and compressor Nika USE TID UTD    omeprazole (PRILOSEC) 40 MG capsule     PROVENTIL HFA 90 mcg/actuation inhaler     semaglutide (OZEMPIC) 1 mg/dose (4 mg/3 mL) Inject 1 mg into the skin every  "7 days.    triamcinolone acetonide 0.1% (KENALOG) 0.1 % cream Apply topically.    TRUEPLUS LANCETS 33 gauge Misc Apply topically 2 (two) times daily.   Last reviewed on 8/29/2022 12:10 PM by Daquan Isaac MD    Review of patient's allergies indicates:   Allergen Reactions    Codeine Other (See Comments)     "it makes me feel crazy"    Prednisone Palpitations     "it makes my heart beat fast. I can take the shot"    Last reviewed on  8/29/2022 12:10 PM by Daquan Isaac      Tasks added this encounter   10/14/2022 - Refill Call (Auto Added)  9/23/2022 - Pickup Reminder   Tasks due within next 3 months   No tasks due.     Zoya Gregory, PharmD  [facility]  [address] [place] [address]  Phone: [phone]  Fax: [phone]        "

## 2022-10-14 ENCOUNTER — SPECIALTY PHARMACY (OUTPATIENT)
Dept: PHARMACY | Facility: CLINIC | Age: 62
End: 2022-10-14
Payer: MEDICARE

## 2022-10-14 NOTE — TELEPHONE ENCOUNTER
Specialty Pharmacy - Refill Coordination    Specialty Medication Orders Linked to Encounter      Flowsheet Row Most Recent Value   Medication #1 abatacept (ORENCIA CLICKJECT) 125 mg/mL AtIn (Order#[id number], Rx#[id number])     Spoke with pt- she completed a course of Augmentin for sinus infection.  She is due for dose of Orencia today.  Confirmed pt s/s of infection have completely resolved and she is back to baseline.  Infection precautions reviewed.   Given abx completed and infection has resolved, advised pt ok to proceed with Orencia injection today.  Pt expressed understanding.        Refill Questions - Documented Responses      Flowsheet Row Most Recent Value   Patient Availability and HIPAA Verification    Does patient want to proceed with activity? Yes   HIPAA/medical authority confirmed? Yes   Relationship to patient of person spoken to? Self   Refill Screening Questions    Changes to allergies? No   Changes to medications? Yes  [completed course of augmentin]   New conditions since last clinic visit? Yes  [sinus infection]   Unplanned office visit, urgent care, ED, or hospital admission in the last 4 weeks? Yes  [sinus infection]   How does patient/caregiver feel medication is working? Good   Financial problems or insurance changes? No   How many doses of your specialty medications were missed in the last 4 weeks? 0   Would patient like to speak to a pharmacist? Yes   When does the patient need to receive the medication? 10/21/22   Refill Delivery Questions    How will the patient receive the medication? Pickup   When does the patient need to receive the medication? 10/21/22   Expected Copay ($) 0   Is the patient able to afford the medication copay? Yes   Payment Method zero copay   Days supply of Refill 28   Supplies needed? No supplies needed   Refill activity completed? Yes   Refill activity plan Refill scheduled   Shipment/Pickup Date: 10/20/22            Current Outpatient Medications   Medication  "Sig    abatacept (ORENCIA CLICKJECT) 125 mg/mL AtIn Inject 125 mg into the skin every 7 days.    albuterol (PROVENTIL) 2.5 mg /3 mL (0.083 %) nebulizer solution     amLODIPine (NORVASC) 10 MG tablet     ascorbic acid, vitamin C, (VITAMIN C) 1000 MG tablet Take 1,000 mg by mouth once daily.    aspirin (ECOTRIN) 81 MG EC tablet Take 81 mg by mouth once daily.    BD ULTRA-FINE SHORT PEN NEEDLE 31 gauge x 5/16" Ndle Use 1 needles with each insulin injection, 4 times a day, ICD10: 11.9    betamethasone dipropionate (DIPROLENE) 0.05 % cream     blood sugar diagnostic Strp One test strip use 3 times a day to check blood glucose,  ICD-10: E11.9, compatible with insurance/glucometer    blood-glucose meter kit One glucometer, use to check 3 times a day.   ICD-10: E11.9, Dispense machine covered by insurance    cetirizine (ZYRTEC) 10 MG tablet Take 10 mg by mouth once daily.    clobetasol 0.05% (TEMOVATE) 0.05 % Oint Apply topically 2 (two) times daily. To rash on hands for flares up to 2 weeks    cyanocobalamin, vitamin B-12, (VITAMIN B-12 ORAL) Take by mouth.    DEXCOM G6  Misc 1 , use as directed    DEXCOM G6 SENSOR Nika 1 sensor, change every 10 days    DEXCOM G6 TRANSMITTER Nika 1 Transmitter, use as directed    diclofenac sodium (VOLTAREN) 1 % Gel Apply 2 g topically 4 (four) times daily.    DULoxetine (CYMBALTA) 30 MG capsule two capsules daily    ELDERBERRY FRUIT ORAL Take by mouth.    enalapril (VASOTEC) 20 MG tablet     fluticasone propionate (FLONASE) 50 mcg/actuation nasal spray     gabapentin (NEURONTIN) 100 MG capsule One to two capsules daily    HUMALOG KWIKPEN INSULIN 100 unit/mL pen Give 14 units before each meals, three times a day. Give 90 day supply    hydroCHLOROthiazide (HYDRODIURIL) 25 MG tablet     ketoconazole (NIZORAL) 2 % cream APPLY CREAM TOPICALLY TO AFFECTED AREA TWICE DAILY FOR 14 DAYS    lancing device Misc One device, used to check blood glucose, ICD-10: E11.9    MARTINE RANDOLPH" "U-100 INSULIN glargine 100 units/mL (3mL) SubQ pen Inject 60 Units into the skin every evening. Give 90 day supply    metFORMIN (GLUCOPHAGE) 1000 MG tablet Take 1 tablet (1,000 mg total) by mouth 2 (two) times daily with meals.    nebulizer and compressor Nika USE TID UTD    omeprazole (PRILOSEC) 40 MG capsule     PROVENTIL HFA 90 mcg/actuation inhaler     semaglutide (OZEMPIC) 1 mg/dose (4 mg/3 mL) Inject 1 mg into the skin every 7 days.    triamcinolone acetonide 0.1% (KENALOG) 0.1 % cream Apply topically.    TRUEPLUS LANCETS 33 gauge Misc Apply topically 2 (two) times daily.   Last reviewed on 8/29/2022 12:10 PM by Daquan Isaac MD    Review of patient's allergies indicates:   Allergen Reactions    Codeine Other (See Comments)     "it makes me feel crazy"    Prednisone Palpitations     "it makes my heart beat fast. I can take the shot"    Last reviewed on  8/29/2022 12:10 PM by Daquan Isaac      Tasks added this encounter   11/11/2022 - Refill Call (Auto Added)  10/21/2022 - Pickup Reminder   Tasks due within next 3 months   No tasks due.     Mela Patel, PharmD  [facility]  [address] [place] [address]  Phone: [phone]  Fax: [phone]        "

## 2022-10-14 NOTE — TELEPHONE ENCOUNTER
Outgoing call regarding Orencia refill. Pt stated she is due to inject on 10/21/22 and would like to  on 10/20/22. Pt stated she went to [facility] and was given Aug[facility] infection[facility]

## 2022-11-11 ENCOUNTER — SPECIALTY PHARMACY (OUTPATIENT)
Dept: PHARMACY | Facility: CLINIC | Age: 62
End: 2022-11-11
Payer: MEDICARE

## 2022-11-11 NOTE — TELEPHONE ENCOUNTER
"Specialty Pharmacy - Refill Coordination    Specialty Medication Orders Linked to Encounter      Flowsheet Row Most Recent Value   Medication #1 abatacept (ORENCIA CLICKJECT) 125 mg/mL AtIn (Order#[id number], Rx#[id number])            Refill Questions - Documented Responses      Flowsheet Row Most Recent Value   Patient Availability and HIPAA Verification    Does patient want to proceed with activity? Yes   HIPAA/medical authority confirmed? Yes   Relationship to patient of person spoken to? Self   Refill Screening Questions    Changes to allergies? No   Changes to medications? No   New conditions since last clinic visit? No   Unplanned office visit, urgent care, ED, or hospital admission in the last 4 weeks? No   How does patient/caregiver feel medication is working? Good   Financial problems or insurance changes? No   How many doses of your specialty medications were missed in the last 4 weeks? 0   Would patient like to speak to a pharmacist? No   When does the patient need to receive the medication? 11/16/22   Refill Delivery Questions    How will the patient receive the medication? Pickup   When does the patient need to receive the medication? 11/16/22   Expected Copay ($) 0   Is the patient able to afford the medication copay? Yes   Payment Method zero copay   Days supply of Refill 28   Supplies needed? No supplies needed   Refill activity completed? Yes   Refill activity plan Refill scheduled   Shipment/Pickup Date: 11/14/22            Current Outpatient Medications   Medication Sig    abatacept (ORENCIA CLICKJECT) 125 mg/mL AtIn Inject 125 mg into the skin every 7 days.    albuterol (PROVENTIL) 2.5 mg /3 mL (0.083 %) nebulizer solution     amLODIPine (NORVASC) 10 MG tablet     ascorbic acid, vitamin C, (VITAMIN C) 1000 MG tablet Take 1,000 mg by mouth once daily.    aspirin (ECOTRIN) 81 MG EC tablet Take 81 mg by mouth once daily.    BD ULTRA-FINE SHORT PEN NEEDLE 31 gauge x 5/16" Ndle Use 1 needles with each " insulin injection, 4 times a day, ICD10: 11.9    betamethasone dipropionate (DIPROLENE) 0.05 % cream     blood sugar diagnostic Strp One test strip use 3 times a day to check blood glucose,  ICD-10: E11.9, compatible with insurance/glucometer    blood-glucose meter kit One glucometer, use to check 3 times a day.   ICD-10: E11.9, Dispense machine covered by insurance    cetirizine (ZYRTEC) 10 MG tablet Take 10 mg by mouth once daily.    clobetasol 0.05% (TEMOVATE) 0.05 % Oint Apply topically 2 (two) times daily. To rash on hands for flares up to 2 weeks    cyanocobalamin, vitamin B-12, (VITAMIN B-12 ORAL) Take by mouth.    DEXCOM G6  Misc 1 , use as directed    DEXCOM G6 SENSOR Nika 1 sensor, change every 10 days    DEXCOM G6 TRANSMITTER Nika 1 Transmitter, use as directed    diclofenac sodium (VOLTAREN) 1 % Gel Apply 2 g topically 4 (four) times daily.    DULoxetine (CYMBALTA) 30 MG capsule two capsules daily    ELDERBERRY FRUIT ORAL Take by mouth.    enalapril (VASOTEC) 20 MG tablet     fluticasone propionate (FLONASE) 50 mcg/actuation nasal spray     gabapentin (NEURONTIN) 100 MG capsule One to two capsules daily    HUMALOG KWIKPEN INSULIN 100 unit/mL pen Give 14 units before each meals, three times a day. Give 90 day supply    hydroCHLOROthiazide (HYDRODIURIL) 25 MG tablet     ketoconazole (NIZORAL) 2 % cream APPLY CREAM TOPICALLY TO AFFECTED AREA TWICE DAILY FOR 14 DAYS    lancing device Misc One device, used to check blood glucose, ICD-10: E11.9    LANTUS SOLOSTAR U-100 INSULIN glargine 100 units/mL (3mL) SubQ pen Inject 60 Units into the skin every evening. Give 90 day supply    metFORMIN (GLUCOPHAGE) 1000 MG tablet Take 1 tablet (1,000 mg total) by mouth 2 (two) times daily with meals.    nebulizer and compressor Nika USE TID UTD    omeprazole (PRILOSEC) 40 MG capsule     PROVENTIL HFA 90 mcg/actuation inhaler     semaglutide (OZEMPIC) 1 mg/dose (4 mg/3 mL) Inject 1 mg into the skin every 7  "days.    triamcinolone acetonide 0.1% (KENALOG) 0.1 % cream Apply topically.    TRUEPLUS LANCETS 33 gauge Misc Apply topically 2 (two) times daily.   Last reviewed on 8/29/2022 12:10 PM by Daquan Isaac MD    Review of patient's allergies indicates:   Allergen Reactions    Codeine Other (See Comments)     "it makes me feel crazy"    Prednisone Palpitations     "it makes my heart beat fast. I can take the shot"    Last reviewed on  8/29/2022 12:10 PM by Daquan Isaac      Tasks added this encounter   12/7/2022 - Refill Call (Auto Added)  11/15/2022 - Pickup Reminder   Tasks due within next 3 months   No tasks due.     Flavia Gusman formerly [facility]  [address] [place] [id number]  Phone: [phone]  Fax: [phone]        "

## 2022-11-28 ENCOUNTER — LAB VISIT (OUTPATIENT)
Dept: LAB | Facility: HOSPITAL | Age: 62
End: 2022-11-28
Attending: HOSPITALIST
Payer: MEDICARE

## 2022-11-28 DIAGNOSIS — E66.09 CLASS 1 OBESITY DUE TO EXCESS CALORIES WITH SERIOUS COMORBIDITY AND BODY MASS INDEX (BMI) OF 30.0 TO 30.9 IN ADULT: ICD-10-CM

## 2022-11-28 DIAGNOSIS — E11.65 UNCONTROLLED TYPE 2 DIABETES MELLITUS WITH HYPERGLYCEMIA, WITH LONG-TERM CURRENT USE OF INSULIN: ICD-10-CM

## 2022-11-28 DIAGNOSIS — Z79.4 UNCONTROLLED TYPE 2 DIABETES MELLITUS WITH HYPERGLYCEMIA, WITH LONG-TERM CURRENT USE OF INSULIN: ICD-10-CM

## 2022-11-28 LAB
25(OH)D3+25(OH)D2 SERPL-MCNC: 31 NG/ML (ref 30–96)
ANION GAP SERPL CALC-SCNC: 7 MMOL/L (ref 8–16)
BUN SERPL-MCNC: 17 MG/DL (ref 8–23)
C PEPTIDE SERPL-MCNC: 4.62 NG/ML (ref 0.78–5.19)
CALCIUM SERPL-MCNC: 9.3 MG/DL (ref 8.7–10.5)
CHLORIDE SERPL-SCNC: 104 MMOL/L (ref 95–110)
CO2 SERPL-SCNC: 29 MMOL/L (ref 23–29)
CREAT SERPL-MCNC: 0.8 MG/DL (ref 0.5–1.4)
EST. GFR  (NO RACE VARIABLE): >60 ML/MIN/1.73 M^2
ESTIMATED AVG GLUCOSE: 235 MG/DL (ref 68–131)
GLUCOSE SERPL-MCNC: 313 MG/DL (ref 70–110)
HBA1C MFR BLD: 9.8 % (ref 4–5.6)
POTASSIUM SERPL-SCNC: 3.9 MMOL/L (ref 3.5–5.1)
SODIUM SERPL-SCNC: 140 MMOL/L (ref 136–145)

## 2022-11-28 PROCEDURE — 83036 HEMOGLOBIN GLYCOSYLATED A1C: CPT | Performed by: HOSPITALIST

## 2022-11-28 PROCEDURE — 36415 COLL VENOUS BLD VENIPUNCTURE: CPT | Performed by: HOSPITALIST

## 2022-11-28 PROCEDURE — 84681 ASSAY OF C-PEPTIDE: CPT | Performed by: HOSPITALIST

## 2022-11-28 PROCEDURE — 80048 BASIC METABOLIC PNL TOTAL CA: CPT | Performed by: HOSPITALIST

## 2022-11-28 PROCEDURE — 82306 VITAMIN D 25 HYDROXY: CPT | Performed by: HOSPITALIST

## 2022-12-05 ENCOUNTER — OFFICE VISIT (OUTPATIENT)
Dept: ENDOCRINOLOGY | Facility: CLINIC | Age: 62
End: 2022-12-05
Payer: MEDICARE

## 2022-12-05 VITALS
DIASTOLIC BLOOD PRESSURE: 72 MMHG | HEART RATE: 79 BPM | BODY MASS INDEX: 28.06 KG/M2 | TEMPERATURE: 98 F | WEIGHT: 168.63 LBS | SYSTOLIC BLOOD PRESSURE: 139 MMHG

## 2022-12-05 DIAGNOSIS — E11.65 UNCONTROLLED TYPE 2 DIABETES MELLITUS WITH HYPERGLYCEMIA, WITH LONG-TERM CURRENT USE OF INSULIN: ICD-10-CM

## 2022-12-05 DIAGNOSIS — M05.742 RHEUMATOID ARTHRITIS INVOLVING BOTH HANDS WITH POSITIVE RHEUMATOID FACTOR: ICD-10-CM

## 2022-12-05 DIAGNOSIS — E66.09 CLASS 1 OBESITY DUE TO EXCESS CALORIES WITH SERIOUS COMORBIDITY AND BODY MASS INDEX (BMI) OF 30.0 TO 30.9 IN ADULT: ICD-10-CM

## 2022-12-05 DIAGNOSIS — M05.741 RHEUMATOID ARTHRITIS INVOLVING BOTH HANDS WITH POSITIVE RHEUMATOID FACTOR: ICD-10-CM

## 2022-12-05 DIAGNOSIS — Z79.4 UNCONTROLLED TYPE 2 DIABETES MELLITUS WITH HYPERGLYCEMIA, WITH LONG-TERM CURRENT USE OF INSULIN: ICD-10-CM

## 2022-12-05 DIAGNOSIS — E11.65 TYPE 2 DIABETES MELLITUS WITH HYPERGLYCEMIA, WITH LONG-TERM CURRENT USE OF INSULIN: Primary | ICD-10-CM

## 2022-12-05 DIAGNOSIS — Z79.4 TYPE 2 DIABETES MELLITUS WITH HYPERGLYCEMIA, WITH LONG-TERM CURRENT USE OF INSULIN: Primary | ICD-10-CM

## 2022-12-05 PROCEDURE — 3061F PR NEG MICROALBUMINURIA RESULT DOCUMENTED/REVIEW: ICD-10-PCS | Mod: CPTII,S$GLB,, | Performed by: HOSPITALIST

## 2022-12-05 PROCEDURE — 3046F HEMOGLOBIN A1C LEVEL >9.0%: CPT | Mod: CPTII,S$GLB,, | Performed by: HOSPITALIST

## 2022-12-05 PROCEDURE — 3008F PR BODY MASS INDEX (BMI) DOCUMENTED: ICD-10-PCS | Mod: CPTII,S$GLB,, | Performed by: HOSPITALIST

## 2022-12-05 PROCEDURE — 3075F SYST BP GE 130 - 139MM HG: CPT | Mod: CPTII,S$GLB,, | Performed by: HOSPITALIST

## 2022-12-05 PROCEDURE — 1160F PR REVIEW ALL MEDS BY PRESCRIBER/CLIN PHARMACIST DOCUMENTED: ICD-10-PCS | Mod: CPTII,S$GLB,, | Performed by: HOSPITALIST

## 2022-12-05 PROCEDURE — 1159F MED LIST DOCD IN RCRD: CPT | Mod: CPTII,S$GLB,, | Performed by: HOSPITALIST

## 2022-12-05 PROCEDURE — 1159F PR MEDICATION LIST DOCUMENTED IN MEDICAL RECORD: ICD-10-PCS | Mod: CPTII,S$GLB,, | Performed by: HOSPITALIST

## 2022-12-05 PROCEDURE — 3066F PR DOCUMENTATION OF TREATMENT FOR NEPHROPATHY: ICD-10-PCS | Mod: CPTII,S$GLB,, | Performed by: HOSPITALIST

## 2022-12-05 PROCEDURE — 3078F PR MOST RECENT DIASTOLIC BLOOD PRESSURE < 80 MM HG: ICD-10-PCS | Mod: CPTII,S$GLB,, | Performed by: HOSPITALIST

## 2022-12-05 PROCEDURE — 3061F NEG MICROALBUMINURIA REV: CPT | Mod: CPTII,S$GLB,, | Performed by: HOSPITALIST

## 2022-12-05 PROCEDURE — 1160F RVW MEDS BY RX/DR IN RCRD: CPT | Mod: CPTII,S$GLB,, | Performed by: HOSPITALIST

## 2022-12-05 PROCEDURE — 99999 PR PBB SHADOW E&M-EST. PATIENT-LVL V: CPT | Mod: PBBFAC,,, | Performed by: HOSPITALIST

## 2022-12-05 PROCEDURE — 3008F BODY MASS INDEX DOCD: CPT | Mod: CPTII,S$GLB,, | Performed by: HOSPITALIST

## 2022-12-05 PROCEDURE — 99999 PR PBB SHADOW E&M-EST. PATIENT-LVL V: ICD-10-PCS | Mod: PBBFAC,,, | Performed by: HOSPITALIST

## 2022-12-05 PROCEDURE — 99214 OFFICE O/P EST MOD 30 MIN: CPT | Mod: S$GLB,,, | Performed by: HOSPITALIST

## 2022-12-05 PROCEDURE — 3066F NEPHROPATHY DOC TX: CPT | Mod: CPTII,S$GLB,, | Performed by: HOSPITALIST

## 2022-12-05 PROCEDURE — 99214 PR OFFICE/OUTPT VISIT, EST, LEVL IV, 30-39 MIN: ICD-10-PCS | Mod: S$GLB,,, | Performed by: HOSPITALIST

## 2022-12-05 PROCEDURE — 3046F PR MOST RECENT HEMOGLOBIN A1C LEVEL > 9.0%: ICD-10-PCS | Mod: CPTII,S$GLB,, | Performed by: HOSPITALIST

## 2022-12-05 PROCEDURE — 4010F PR ACE/ARB THEARPY RXD/TAKEN: ICD-10-PCS | Mod: CPTII,S$GLB,, | Performed by: HOSPITALIST

## 2022-12-05 PROCEDURE — 4010F ACE/ARB THERAPY RXD/TAKEN: CPT | Mod: CPTII,S$GLB,, | Performed by: HOSPITALIST

## 2022-12-05 PROCEDURE — 3078F DIAST BP <80 MM HG: CPT | Mod: CPTII,S$GLB,, | Performed by: HOSPITALIST

## 2022-12-05 PROCEDURE — 3075F PR MOST RECENT SYSTOLIC BLOOD PRESS GE 130-139MM HG: ICD-10-PCS | Mod: CPTII,S$GLB,, | Performed by: HOSPITALIST

## 2022-12-05 RX ORDER — METFORMIN HYDROCHLORIDE 1000 MG/1
1000 TABLET ORAL 2 TIMES DAILY WITH MEALS
Qty: 180 TABLET | Refills: 3 | Status: SHIPPED | OUTPATIENT
Start: 2022-12-05 | End: 2023-06-08

## 2022-12-05 RX ORDER — INSULIN LISPRO 100 [IU]/ML
INJECTION, SOLUTION INTRAVENOUS; SUBCUTANEOUS
Qty: 45 ML | Refills: 6 | Status: SHIPPED | OUTPATIENT
Start: 2022-12-05

## 2022-12-05 RX ORDER — INSULIN GLARGINE 100 [IU]/ML
45 INJECTION, SOLUTION SUBCUTANEOUS NIGHTLY
Qty: 45 ML | Refills: 6 | Status: SHIPPED | OUTPATIENT
Start: 2022-12-05 | End: 2023-12-05

## 2022-12-05 RX ORDER — PEN NEEDLE, DIABETIC 31 GX5/16"
NEEDLE, DISPOSABLE MISCELLANEOUS
Qty: 200 EACH | Refills: 6 | Status: SHIPPED | OUTPATIENT
Start: 2022-12-05

## 2022-12-05 NOTE — PROGRESS NOTES
Subjective:      Patient ID: Marisol Rosales is a 62 y.o. female presented to [facility] on 12/5/2022.  Chief Complaint:  Diabetes      History of Present Illness: Marisol Rosales is a 62 y.o. female here for type 2 diabetes  Other significant past medical history: RA (follow with rheum), HTN, Obesity      With regards to Diabetes Mellitus Type 2  - Known diabetic complications: peripheral neuropathy  - Diagnosed w/ DM: >15 years ago, on insulin for >8 years      Interval history:  Here for follow-up type 2 diabetes.  Patient was able to get Dexcom.  Unfortunately did not call to set up appointment for starter.  A1c trending down:  Currently 9.8%  Patient report weight loss of 30 lb  Inconsistent insulin administration reported.  High carbohydrate diet reported.  Did not bring diet log for review    200 this AM  175  190s    Current meds:    Lantus 45 units nightly at 8:00 p.m. nightly  Humalog 10 units to 15 units pending carbohydrates intake, 3 times a day before meals  Ozempic 1.0mg once a week injection  meformin 1000mg twice a day with food     Misses medication doses - yes, forget humalog  Injection Technique: Good  Rotation of injection site: Yes   Previous meds:              Glipizide  Home glucose checks: checks 2x a day, Logs reviewed/Unavailable: oral recall: 200s-300s   Hypoglycemia: denies  Diet/Exercise:               Eating 2x meals per day               Snacking, craving sweets              Drink: water, coke zero  Weight trend: decreasing steadily  Diabetes Education: No  Diabetes Related Hospitalization:  No  Hx of pancreatitis, hx of thyroid cancer: No  Family history of diabetes: Yes, grandmother and brother  Occupation: not working, disabled     Eye exam current (within one year): yes, DR: no  Reports cuts or ulcers on feet: yes 4/4/2022, Denies  Statin: Not taking, ACE/ARB: Taking    Diabetes lab work  Lab Results   Component Value Date    HGBA1C 9.8 (H) 11/28/2022    HGBA1C 10.5 (H)  08/22/2022    HGBA1C 11.6 (H) 05/16/2022     Lab Results   Component Value Date    CPEPTIDE 4.62 11/28/2022      No results found for: FRUCTOSAMINE  Lab Results   Component Value Date    MICALBCREAT 9.6 08/22/2022     No results found for: [id number]    Diabetes Management Status: Reviewed this office visit  Screening or Prevention Patient's value Goal Complete/Controlled?   Lipid profile : 08/22/2022 Annually Yes     Dilated retinal exam : 03/02/2022 Annually Yes     Foot exam   : 06/27/2022 Annually Yes          Reviewed past surgical, medical, family, social history and updated as appropriate.  Review of Systems: see HPI above    Objective:   /72 (BP Location: Right arm)   Pulse 79   Temp 98.4 °F (36.9 °C) (Oral)   Wt 76.5 kg (168 lb 9.6 oz)   BMI 28.06 kg/m²     Body mass index is 28.06 kg/m².  Vital signs reviewed    Physical Exam  Vitals and nursing note reviewed.   Constitutional:       Appearance: Normal appearance. She is well-developed. She is not ill-appearing.   Neck:      Thyroid: No thyromegaly.   Pulmonary:      Effort: Pulmonary effort is normal. No respiratory distress.   Musculoskeletal:         General: Normal range of motion.      Cervical back: Normal range of motion.   Neurological:      General: No focal deficit present.      Mental Status: She is alert. Mental status is at baseline.   Psychiatric:         Mood and Affect: Mood normal.         Behavior: Behavior normal.     Lab Reviewed:   Lab Results   Component Value Date    HGBA1C 9.8 (H) 11/28/2022     Lab Results   Component Value Date    CHOL 195 08/22/2022    HDL 57 08/22/2022    LDLCALC 124.2 08/22/2022    TRIG 69 08/22/2022    CHOLHDL 29.2 08/22/2022       Lab Results   Component Value Date     11/28/2022    K 3.9 11/28/2022     11/28/2022    CO2 29 11/28/2022     (H) 11/28/2022    BUN 17 11/28/2022    CREATININE 0.8 11/28/2022    CALCIUM 9.3 11/28/2022    PROT 7.3 08/15/2022    ALBUMIN 3.7 08/15/2022     "BILITOT 0.3 08/15/2022    ALKPHOS 116 08/15/2022    AST 15 08/15/2022    ALT 18 08/15/2022    ANIONGAP 7 (L) 11/28/2022    ESTGFRAFRICA >60 05/16/2022    EGFRNONAA >60 05/16/2022    TSH 1.524 08/15/2022     Lab Results   Component Value Date    QMNPOCXV61GS 31 11/28/2022    CALCIUM 9.3 11/28/2022    CALCIUM 8.9 08/22/2022    CALCIUM 9.6 08/15/2022    ALKPHOS 116 08/15/2022    ALKPHOS 152 (H) 03/17/2022    ALKPHOS 122 10/05/2021    TSH 1.524 08/15/2022     Assessment     1. Type 2 diabetes mellitus with hyperglycemia, with long-term current use of insulin        2. Uncontrolled type 2 diabetes mellitus with hyperglycemia, with long-term current use of insulin  Ambulatory referral/consult to Diabetes Education    LANTUS SOLOSTAR U-100 INSULIN glargine 100 units/mL SubQ pen    metFORMIN (GLUCOPHAGE) 1000 MG tablet    HUMALOG KWIKPEN INSULIN 100 unit/mL pen    BD ULTRA-FINE SHORT PEN NEEDLE 31 gauge x 5/16" Ndle    Hemoglobin A1C    Basic Metabolic Panel      3. Class 1 obesity due to excess calories with serious comorbidity and body mass index (BMI) of 30.0 to 30.9 in adult        4. Rheumatoid arthritis involving both hands with positive rheumatoid factor            Plan     Type 2 diabetes mellitus with hyperglycemia, with long-term current use of insulin  - Diabetes is not at goal, but with improvement given given most current A1C , Goal A1C for patient is 7%  - Complicated by hyperglycemia, dietary indiscretion, missed insulin dose  - rare hypoglycemia noted.  Due to too much insulin administration reported, not seen on Dexcom  - Diabetic supplies/medications reviewed this visit to ensure continue refills and compliance  - Advised patient to get periodic eye and feet exam.     Plan  - needs to set up Dexcom G6, will have Education appointment  - continue Lantus 45 units nightly, advised patient to given at 8:00 p.m. nightly  - increase Humalog 18 units pending carbohydrates intake, 3 times a day before meals  - " continue Ozempic 1.0mg once a week injection  - Continue meformin 1000mg twice a day with food  - Advised pt to check glucoses regularly, asked to filled glucose log and bring back for review at next office visit  - Follow up as scheduled with lab work prior    Class 1 obesity due to excess calories with serious comorbidity and body mass index (BMI) of 30.0 to 30.9 in adult  - Body mass index is 28.06 kg/m².  - dietary discussion as above  - continue to monitor weight, weight loss noted  - continue Ozempic 1.0 mg once a week    Rheumatoid arthritis involving both hands with positive rheumatoid factor  - continue management by Rheumatology, not on steroid      Advised patient to follow up with PCP for routine health maintenance care.   RTC in 3-4 months    Daquan Isaac M.D.  Endocrinology  [facility]  12/5/2022      Disclaimer: This note has been generated in part with the use of voice-recognition software. There may be typographical errors that have been missed during proof-reading.  -------------------------------------------------------------------------------------------------  Indications for CGMs:  Patent with diagnosed: Diabetes Mellitus that required frequent glucose monitoring (4 + times a day and 4x/day testing), frequent adjustments to insulin regiment based on BG or CGM results. Patent requires a therapeutic CGM and is willing to use therapeutic CGM/SMBG for Necessary frequent adjustments in insulin therapy, patient demonstrated an understanding of the technology (can use and recognize alerts and alarms). I, the physician, have assessed adherence to CGM regimen and to the plan, patient will have close follow up in clinic as indicated, every 3 months to 6 months.     Patient experiences (including but not limited to):  [x]   Discrepancies between records and A1C, at risk severe hypoglycemia episode and hospitalization  [x]   Recurrent, unexplained, severe hypoglycemic  episodes  [x]   Postprandial hyperglycemia  [x]   Unexplained blood glucose excursions  []   Impaired awareness of hypoglycemia    []   Patient uses insulin pump for diabetes management: will need frequent adjustments to insulin regiment based on BG: including adjustment to  insulin pump setting, sliding scale, correction factor, additional bolusing/correction    I believe that the patient will greatly benefit from wearing CGM because this will allow for better glucose control, help to avoid high/lows and prevent hospitalization.  This also is safer for patient in using CGM during the COVID public health emergency.

## 2022-12-05 NOTE — PATIENT INSTRUCTIONS
((PLEASE follow up with me regularly to get refills of your diabetes medications, or future refills will be rejected))     ----------------------------------------------------------  REGIMENT     __ Lantus 45 units nightly, 8 PM   If glucose is less than 80 in the morning for 4 straight day>> can decrease to 40 units at night  __ Humalog 18 units for each meals twice a day with big meal  __ Ozempic 1.0mg once a week injection  __ Meformin 1000mg twice a day with food        Goal for your blood sugar   In the morning, before breakfast: 100-140  Before going to bed: 100-140  Do not go to bed with glucose less than 100, have a small snack if lower than 100    Please check glucose 3 times a day (before breakfast, lunch, dinner and at bedtime).   You can keep track of the glucose with Glucose logs or any papers  Please filled out and bring back to next office visit for me to review  Document any (LOW BLOOD GLUCOSE) hypoglycemia  episode with date and time for me to review      Contact information:    Daquan Isaac M.D  [facility] [address] [facility] [address], [address]  [place] [address]    Office:  [phone]  Fax:  [phone]     ----------------------------------------------------------

## 2022-12-05 NOTE — ASSESSMENT & PLAN NOTE
- Body mass index is 28.06 kg/m².  - dietary discussion as above  - continue to monitor weight, weight loss noted  - continue Ozempic 1.0 mg once a week

## 2022-12-05 NOTE — ASSESSMENT & PLAN NOTE
- Diabetes is not at goal, but with improvement given given most current A1C , Goal A1C for patient is 7%  - Complicated by hyperglycemia, dietary indiscretion, missed insulin dose  - rare hypoglycemia noted.  Due to too much insulin administration reported, not seen on Dexcom  - Diabetic supplies/medications reviewed this visit to ensure continue refills and compliance  - Advised patient to get periodic eye and feet exam.     Plan  - needs to set up Dexcom G6, will have Education appointment  - continue Lantus 45 units nightly, advised patient to given at 8:00 p.m. nightly  - increase Humalog 18 units pending carbohydrates intake, 3 times a day before meals  - continue Ozempic 1.0mg once a week injection  - Continue meformin 1000mg twice a day with food  - Advised pt to check glucoses regularly, asked to filled glucose log and bring back for review at next office visit  - Follow up as scheduled with lab work prior

## 2022-12-07 ENCOUNTER — SPECIALTY PHARMACY (OUTPATIENT)
Dept: PHARMACY | Facility: CLINIC | Age: 62
End: 2022-12-07
Payer: MEDICARE

## 2022-12-07 NOTE — TELEPHONE ENCOUNTER
"Specialty Pharmacy - Refill Coordination    Specialty Medication Orders Linked to Encounter      Flowsheet Row Most Recent Value   Medication #1 abatacept (ORENCIA CLICKJECT) 125 mg/mL AtIn (Order#[id number], Rx#[id number])            Refill Questions - Documented Responses      Flowsheet Row Most Recent Value   Patient Availability and HIPAA Verification    Does patient want to proceed with activity? Yes   HIPAA/medical authority confirmed? Yes   Relationship to patient of person spoken to? Self   Refill Screening Questions    Changes to allergies? No   Changes to medications? No   New conditions since last clinic visit? No   Unplanned office visit, urgent care, ED, or hospital admission in the last 4 weeks? No   How does patient/caregiver feel medication is working? Good   Financial problems or insurance changes? No   How many doses of your specialty medications were missed in the last 4 weeks? 0   Would patient like to speak to a pharmacist? No   When does the patient need to receive the medication? 12/16/22   Refill Delivery Questions    How will the patient receive the medication? Pickup   When does the patient need to receive the medication? 12/16/22   Expected Copay ($) 0   Is the patient able to afford the medication copay? Yes   Payment Method zero copay   Days supply of Refill 28   Supplies needed? No supplies needed   Refill activity completed? Yes   Refill activity plan Refill scheduled   Shipment/Pickup Date: 12/12/22            Current Outpatient Medications   Medication Sig    abatacept (ORENCIA CLICKJECT) 125 mg/mL AtIn Inject 125 mg into the skin every 7 days.    albuterol (PROVENTIL) 2.5 mg /3 mL (0.083 %) nebulizer solution     amLODIPine (NORVASC) 10 MG tablet     ascorbic acid, vitamin C, (VITAMIN C) 1000 MG tablet Take 1,000 mg by mouth once daily.    aspirin (ECOTRIN) 81 MG EC tablet Take 81 mg by mouth once daily.    BD ULTRA-FINE SHORT PEN NEEDLE 31 gauge x 5/16" Ndle Use 1 needles with each " insulin injection, 4 times a day, ICD10: 11.9    betamethasone dipropionate (DIPROLENE) 0.05 % cream     blood sugar diagnostic Strp One test strip use 3 times a day to check blood glucose,  ICD-10: E11.9, compatible with insurance/glucometer    blood-glucose meter kit One glucometer, use to check 3 times a day.   ICD-10: E11.9, Dispense machine covered by insurance    cetirizine (ZYRTEC) 10 MG tablet Take 10 mg by mouth once daily.    clobetasol 0.05% (TEMOVATE) 0.05 % Oint Apply topically 2 (two) times daily. To rash on hands for flares up to 2 weeks    cyanocobalamin, vitamin B-12, (VITAMIN B-12 ORAL) Take by mouth.    DEXCOM G6  Misc 1 , use as directed    DEXCOM G6 SENSOR Nika 1 sensor, change every 10 days    DEXCOM G6 TRANSMITTER Nika 1 Transmitter, use as directed    diclofenac sodium (VOLTAREN) 1 % Gel Apply 2 g topically 4 (four) times daily.    DULoxetine (CYMBALTA) 30 MG capsule two capsules daily    ELDERBERRY FRUIT ORAL Take by mouth.    enalapril (VASOTEC) 20 MG tablet     fluticasone propionate (FLONASE) 50 mcg/actuation nasal spray     gabapentin (NEURONTIN) 100 MG capsule One to two capsules daily    HUMALOG KWIKPEN INSULIN 100 unit/mL pen Give 18 units before each meals, three times a day. Give 90 day supply    hydroCHLOROthiazide (HYDRODIURIL) 25 MG tablet     ketoconazole (NIZORAL) 2 % cream APPLY CREAM TOPICALLY TO AFFECTED AREA TWICE DAILY FOR 14 DAYS    lancing device Misc One device, used to check blood glucose, ICD-10: E11.9    LANTUS SOLOSTAR U-100 INSULIN glargine 100 units/mL SubQ pen Inject 45 Units into the skin every evening. Give 90 day supply    metFORMIN (GLUCOPHAGE) 1000 MG tablet Take 1 tablet (1,000 mg total) by mouth 2 (two) times daily with meals.    nebulizer and compressor Nika USE TID UTD    omeprazole (PRILOSEC) 40 MG capsule     PROVENTIL HFA 90 mcg/actuation inhaler     semaglutide (OZEMPIC) 1 mg/dose (4 mg/3 mL) Inject 1 mg into the skin every 7 days.     "triamcinolone acetonide 0.1% (KENALOG) 0.1 % cream Apply topically.    TRUEPLUS LANCETS 33 gauge Misc Apply topically 2 (two) times daily.   Last reviewed on 12/5/2022  4:32 PM by Daquan Isaac MD    Review of patient's allergies indicates:   Allergen Reactions    Codeine Other (See Comments)     "it makes me feel crazy"    Prednisone Palpitations     "it makes my heart beat fast. I can take the shot"    Last reviewed on  12/5/2022 4:32 PM by Daquan Isaac      Tasks added this encounter   1/6/2023 - Refill Call (Auto Added)  12/13/2022 - Pickup Reminder   Tasks due within next 3 months   3/3/2023 - Clinical - Follow Up Assesement (Annual)     [facility]  [address]  [facility] [address]  Phone: [phone]  Fax: [phone]        "

## 2022-12-12 ENCOUNTER — CLINICAL SUPPORT (OUTPATIENT)
Dept: DIABETES | Facility: CLINIC | Age: 62
End: 2022-12-12
Payer: MEDICARE

## 2022-12-12 VITALS — HEIGHT: 65 IN | BODY MASS INDEX: 27.76 KG/M2 | WEIGHT: 166.63 LBS

## 2022-12-12 DIAGNOSIS — Z79.4 UNCONTROLLED TYPE 2 DIABETES MELLITUS WITH HYPERGLYCEMIA, WITH LONG-TERM CURRENT USE OF INSULIN: ICD-10-CM

## 2022-12-12 DIAGNOSIS — E11.65 UNCONTROLLED TYPE 2 DIABETES MELLITUS WITH HYPERGLYCEMIA, WITH LONG-TERM CURRENT USE OF INSULIN: ICD-10-CM

## 2022-12-12 PROCEDURE — 99999 PR PBB SHADOW E&M-EST. PATIENT-LVL I: CPT | Mod: PBBFAC,,,

## 2022-12-12 PROCEDURE — G0108 DIAB MANAGE TRN  PER INDIV: HCPCS | Mod: S$GLB,,, | Performed by: FAMILY MEDICINE

## 2022-12-12 PROCEDURE — G0108 PR DIAB MANAGE TRN  PER INDIV: ICD-10-PCS | Mod: S$GLB,,, | Performed by: FAMILY MEDICINE

## 2022-12-12 PROCEDURE — 99999 PR PBB SHADOW E&M-EST. PATIENT-LVL I: ICD-10-PCS | Mod: PBBFAC,,,

## 2022-12-12 NOTE — PROGRESS NOTES
Diabetes Care Specialist Progress Note  Author: Andria Corona RN  Date: 12/12/2022    Program Intake  Reason for Diabetes Program Visit:: Intervention  Type of Intervention:: Individual  Individual: Device Training  Device Training: Personal CGM  Current diabetes risk level:: moderate  In the last 12 months, have you:: none  Permission to speak with others about care:: yes    Lab Results   Component Value Date    HGBA1C 9.8 (H) 11/28/2022       Clinical    Patient Health Rating  Compared to other people your age, how would you rate your health?: Fair    Clinical Assessment  Current Diabetes Treatment: Insulin, Injectable, Oral Medication (Humalog 18U BIDw/ lg meals, Lantus 45U @ HS, Metformin 1000mg BID, Ozempic 1.omg weekly)  Have you ever experienced hypoglycemia (low blood sugar)?: no  Have you ever experienced hyperglycemia (high blood sugar)?: yes  In the last month, how often have you experienced high blood sugar?: once every other week  Are you able to tell when your blood sugar is high?: Yes  Have you ever been hospitalized because your blood sugar was high?: no    Medication Information  How do you obtain your medications?: Patient drives  How many days a week do you miss your medications?: 1  Do you sometimes have difficulty refilling your medications?: No  Medication adherence impacting ability to self-manage diabetes?: No    Labs  Do you have regular lab work to monitor your medications?: Yes  Type of Regular Lab Work: A1c  Where do you get your labs drawn?: [facility]  Lab Compliance Barriers: No    Nutritional Status  Diet: Regular  Meal Plan 24 Hour Recall: Breakfast, Dinner, Snack (Pt says she usually eats two big meals)  Meal Plan 24 Hour Recall - Breakfast: oatmeal, (2) toast, coffee w/almond cremer  Meal Plan 24 Hour Recall - Dinner: salmon, salad, sweet potatoe puffs  Meal Plan 24 Hour Recall - Snack: sandwich or fruit cup  Change in appetite?: Yes  Recent Changes in Weight: Weight Loss  Was  weight loss intentional or unintentional?: Intentional  Current nutritional status an area of need that is impacting patient's ability to self-manage diabetes?: No    Additional Social History  Support  Does anyone support you with your diabetes care?: yes  Who supports you?: son/daughter  Who takes you to your medical appointments?: self  Does the current support meet the patient's needs?: Yes  Is Support an area impacting ability to self-manage diabetes?: No    Access to Mass Media & Technology  Does the patient have access to any of the following devices or technologies?: Smart phone  Media or technology needs impacting ability to self-manage diabetes?: No    Cognitive/Behavioral Health  Alert and Oriented: Yes  Difficulty Thinking: No  Requires Prompting: No  Requires assistance for routine expression?: No  Cognitive or behavioral barriers impacting ability to self-manage diabetes?: No    Culture/Worship  Culture or Catholic beliefs that may impact ability to access healthcare: No    Communication  Language preference: English  Hearing Problems: No  Vision Problems: Yes  Vision problem type:: Decreased Vision  Vision Assistance: Glasses  Communication needs impacting ability to self-manage diabetes?: No    Health Literacy  Preferred Learning Method: Face to Face, Reading Materials  How often do you need to have someone help you read instructions, pamphlets, or written material from your doctor or pharmacy?: Never  Health literacy needs impacting ability to self-manage diabetes?: No      Diabetes Self-Management Skills Assessment  Diabetes Disease Process/Treatment Options  Patient/caregiver able to state what happens when someone has diabetes.: somewhat  Patient/caregiver knows what type of diabetes they have.: yes  Diabetes Type : Type II  Patient/caregiver able to identify at least three signs and symptoms of diabetes.: no  Patient able to identify at least three risk factors for diabetes.: no  Diabetes  Disease Process/Treatment Options: Skills Assessment Completed: Yes  Assessment indicates:: Knowledge deficit, Instruction Needed  Area of need?: Yes    Nutrition/Healthy Eating  Challenges to healthy eating:: portion control  Method of carbohydrate measurement:: no method  Patient can identify foods that impact blood sugar.: yes  Patient-identified foods:: sweets, starches (bread, pasta, rice, cereal)  Nutrition/Healthy Eating Skills Assessment Completed:: Yes  Assessment indicates:: Instruction Needed, Knowledge deficit  Area of need?: Yes    Physical Activity/Exercise  Patient's daily activity level:: sedentary  Patient formally exercises outside of work.: no  Reasons for not exercising:: other (see comments) (Stop since Covid)  Patient can identify forms of physical activity.: yes  Stated forms of physical activity:: any movement performed by muscles that uses energy  Patient can identify reasons why exercise/physical activity is important in diabetes management.: yes  Identified reasons:: strengthens heart, muscles, and bones  Physical Activity/Exercise Skills Assessment Completed: : Yes  Assessment indicates:: Instruction Needed, Knowledge deficit  Area of need?: Yes    Medications  Patient is able to describe current diabetes management routine.: yes  Diabetes management routine:: insulin, oral medications, injectable medications  Patient is able to identify current diabetes medications, dosages, and appropriate timing of medications.: yes  Patient understands the purpose of the medications taken for diabetes.: yes  Patient reports problems or concerns with current medication regimen.: no  Medication Skills Assessment Completed:: Yes  Assessment indicates:: Instruction Needed  Area of need?: No    Home Blood Glucose Monitoring  Patient states that blood sugar is checked at home daily.: yes  Monitoring Method:: home glucometer  Home glucometer meter type:: Insurance Preferred Meter  How often do you check your  blood sugar?: 3 times a day  When do you check your blood sugar?: Before breakfast, Before lunch, Before dinner  When you check what is your typical blood sugar range? : 100's- 200's  Blood glucose logs:: no, encouraged to keep logs, encouraged to bring logs to provider visits  Blood glucose logs reviewed today?: no  Home Blood Glucose Monitoring Skills Assessment Completed: : Yes  Assessment indicates:: Knowledge deficit, Instruction Needed  Area of need?: Yes    Acute Complications  Patient is able to identify types of acute complications: No  Acute Complications Skills Assessment Completed: : Yes  Assessment indicates:: Instruction Needed, Knowledge deficit  Area of need?: Yes    Chronic Complications  Chronic Complications Skills Assessment Completed: : No  Deferred due to:: Time    Psychosocial/Coping  Psychosocial/Coping Skills Assessment Completed: : No  Deffered due to:: Time      Assessment Summary and Plan    Based on today's diabetes care assessment, the following areas of need were identified:      Social 12/12/2022   Support No   Access to Mass Media/Tech No   Cognitive/Behavioral Health No   Culture/Moravian No   Communication No   Health Literacy No        Clinical 12/12/2022   Medication Adherence No   Lab Compliance No   Nutritional Status No        Diabetes Self-Management Skills 12/12/2022   Diabetes Disease Process/Treatment Options Yes-Discussed what is diabetes and the progression of the disease. Discussed significance of current A1c and blood glucose goals.   Nutrition/Healthy Eating Yes- see care planning   Physical Activity/Exercise Yes-Discussed benefits of exercise as it relates to insulin resistance and weight loss   Medication No   Home Blood Glucose Monitoring Yes-Discussed BS goals and importance of monitoring and recording BS for review and maintenance of medication   Acute Complications Yes-Reviewed blood glucose goals, prevention, detection, signs and symptoms, and treatment of  "hypoglycemia. Advised to carry a source of fast acting carbs          Today's interventions were provided through individual discussion, instruction, and written materials were provided.      Patient verbalized understanding of instruction and written materials.  Pt was able to return back demonstration of instructions today. Patient understood key points, needs reinforcement and further instruction.     Diabetes Self-Management Care Plan:    Today's Diabetes Self-Management Care Plan was developed with Marisol's input. Marisol has agreed to work toward the following goal(s) to improve his/her overall diabetes control.      Care Plan: Diabetes Management   Updates made since 11/12/2022 12:00 AM        Problem: Blood Glucose Self-Monitoring         Goal: Patient agrees to change Dexcom G6 sensor every 10 days.    Start Date: 12/12/2022   Expected End Date: 1/9/2023   Priority: High   Barriers: No Barriers Identified   Note:    12/12/22 - - DEXCOM G6 CGM TRAINING     Patient referred to clinic today for Dexcom G6 continuous glucose sensor system training.  Patient arrived with  fully charged. Education was provided using "Quick Start Guide" per Dexcom protocol.     Pt will be using her  as the primary .  Overview:  5min glucose reading updates, trending arrows, BG graph screens, battery life indicator, Blue Tooth Symbol.  Menus: trend Graph, start sensor, enter BG, events, Alerts, Settings, Shutdown, Stop Sensor   Settings:                          * Urgent Low: 55 mg/dL                          * Urgent Low Soon: on                          * Low alert: 70 mg/dl repeat 15 min                          * High alert: 250 mg/dl                           * Rise rate: off                          * Fall Rate: off                          * Signal Loss: on repeat 20 min                          * No Reading: on repeat 20 min                          * Always sound: on                          * " Alert Schedule:  (instructed on how to set up alert schedule if desired)     Reviewed additional setting options with patient, including Graph Height and Transmitter ID. Transmitter was paired with .    Reviewed where to find sensor insertion time and sensor expiration date.   Discussed no need to calibrate sensor during the entirety of the 10 day wear. Discussed that pt can calibrate sensor if desired, but at that time she will need to continue calibrating every 12 hours for sensor to remain accurate. Reviewed appropriate calibration techniques.  Reviewed sensor site selection. Site selected and prepped using aseptic technique Inserted to right abdomen. Transmitter placed in pod and secured.  Practiced sensor pod/transmitter removal from site, and removal of transmitter from sensor pod.  Patient able to demonstrate without difficulty.  Encouraged to review manual prior to starting another sensor.   Reviewed problem solving aspects of sensor transmission/ variables that can disrupt RF transmission.  range 20 feet, but the first 3 hrs keep within 3 feet of transmitter.  Pt instructed on lag time of interstitial fluid from CBG and was advised to tx hypoglycemia and dose insulin based on SMBG values.  Dexcom technical support contact number given and examples of when to contact them discussed. Pt will download software at home for Dexcom download.   Patient advised to always check glucose with glucometer should readings do not match symptoms felt (ex. Hypo or hyperglycemia).                 Follow Up Plan     Follow up in about 4 weeks (around 1/9/2023) for F/U dexcom and MNT.    Today's care plan and follow up schedule was discussed with patient.  Marisol verbalized understanding of the care plan, goals, and agrees to follow up plan.        The patient was encouraged to communicate with his/her health care provider/physician and care team regarding his/her condition(s) and treatment.  I provided the  patient with my contact information today and encouraged to contact me via phone or [facility] Portal as needed.     Length of Visit   Total Time: 60 Minutes

## 2022-12-19 NOTE — TELEPHONE ENCOUNTER
Patient did not  O[facility], medication returned to stock. 3 call attempts to reach patient have been unsuccessful. Closing patient out of [facility] at this time.

## 2022-12-27 ENCOUNTER — HOSPITAL ENCOUNTER (EMERGENCY)
Facility: HOSPITAL | Age: 62
Discharge: HOME | End: 2022-12-27
Attending: EMERGENCY MEDICINE
Payer: MEDICARE

## 2022-12-27 ENCOUNTER — SPECIALTY PHARMACY (OUTPATIENT)
Dept: PHARMACY | Facility: CLINIC | Age: 62
End: 2022-12-27
Payer: MEDICARE

## 2022-12-27 VITALS
DIASTOLIC BLOOD PRESSURE: 85 MMHG | OXYGEN SATURATION: 97 % | HEART RATE: 80 BPM | SYSTOLIC BLOOD PRESSURE: 167 MMHG | TEMPERATURE: 98 F | RESPIRATION RATE: 20 BRPM | BODY MASS INDEX: 27.66 KG/M2 | WEIGHT: 166 LBS | HEIGHT: 65 IN

## 2022-12-27 DIAGNOSIS — S09.90XA INJURY OF HEAD, INITIAL ENCOUNTER: Primary | ICD-10-CM

## 2022-12-27 DIAGNOSIS — G44.319 ACUTE POST-TRAUMATIC HEADACHE, NOT INTRACTABLE: ICD-10-CM

## 2022-12-27 PROCEDURE — 99283 EMERGENCY DEPT VISIT LOW MDM: CPT | Mod: ER

## 2022-12-27 RX ORDER — BUTALBITAL, ACETAMINOPHEN AND CAFFEINE 50; 325; 40 MG/1; MG/1; MG/1
1 TABLET ORAL EVERY 6 HOURS PRN
Qty: 12 TABLET | Refills: 0 | Status: SHIPPED | OUTPATIENT
Start: 2022-12-27 | End: 2022-12-30

## 2022-12-27 NOTE — TELEPHONE ENCOUNTER
Specialty Pharmacy - Refill Coordination    Specialty Medication Orders Linked to Encounter      Flowsheet Row Most Recent Value   Medication #1 abatacept (ORENCIA CLICKJECT) 125 mg/mL AtIn (Order#[id number], Rx#[id number])            Refill Questions - Documented Responses      Flowsheet Row Most Recent Value   Patient Availability and HIPAA Verification    Does patient want to proceed with activity? Yes   HIPAA/medical authority confirmed? Yes   Relationship to patient of person spoken to? Self   Refill Screening Questions    Changes to allergies? No   Changes to medications? No   New conditions since last clinic visit? No   Unplanned office visit, urgent care, ED, or hospital admission in the last 4 weeks? No   How does patient/caregiver feel medication is working? Good   Financial problems or insurance changes? No   How many doses of your specialty medications were missed in the last 4 weeks? 0   Would patient like to speak to a pharmacist? No   When does the patient need to receive the medication? 12/30/22   Refill Delivery Questions    How will the patient receive the medication? Pickup   When does the patient need to receive the medication? 12/30/22   Expected Copay ($) 0   Is the patient able to afford the medication copay? Yes   Payment Method zero copay   Days supply of Refill 28   Supplies needed? No supplies needed   Refill activity completed? Yes   Refill activity plan Refill scheduled   Shipment/Pickup Date: 12/27/22            Current Outpatient Medications   Medication Sig    abatacept (ORENCIA CLICKJECT) 125 mg/mL AtIn Inject 125 mg into the skin every 7 days.    abatacept (ORENCIA CLICKJECT) 125 mg/mL AtIn Inject 125 mg into the skin every 7 days.    albuterol (PROVENTIL) 2.5 mg /3 mL (0.083 %) nebulizer solution     amLODIPine (NORVASC) 10 MG tablet     ascorbic acid, vitamin C, (VITAMIN C) 1000 MG tablet Take 1,000 mg by mouth once daily.    aspirin (ECOTRIN) 81 MG EC tablet Take 81 mg by mouth  "once daily.    BD ULTRA-FINE SHORT PEN NEEDLE 31 gauge x 5/16" Ndle Use 1 needles with each insulin injection, 4 times a day, ICD10: 11.9    betamethasone dipropionate (DIPROLENE) 0.05 % cream     blood sugar diagnostic Strp One test strip use 3 times a day to check blood glucose,  ICD-10: E11.9, compatible with insurance/glucometer    blood-glucose meter kit One glucometer, use to check 3 times a day.   ICD-10: E11.9, Dispense machine covered by insurance    cetirizine (ZYRTEC) 10 MG tablet Take 10 mg by mouth once daily.    clobetasol 0.05% (TEMOVATE) 0.05 % Oint Apply topically 2 (two) times daily. To rash on hands for flares up to 2 weeks    cyanocobalamin, vitamin B-12, (VITAMIN B-12 ORAL) Take by mouth.    DEXCOM G6  Misc 1 , use as directed    DEXCOM G6 SENSOR Nika 1 sensor, change every 10 days    DEXCOM G6 TRANSMITTER Nika 1 Transmitter, use as directed    diclofenac sodium (VOLTAREN) 1 % Gel Apply 2 g topically 4 (four) times daily.    DULoxetine (CYMBALTA) 30 MG capsule two capsules daily    ELDERBERRY FRUIT ORAL Take by mouth.    enalapril (VASOTEC) 20 MG tablet     fluticasone propionate (FLONASE) 50 mcg/actuation nasal spray     gabapentin (NEURONTIN) 100 MG capsule One to two capsules daily    HUMALOG KWIKPEN INSULIN 100 unit/mL pen Give 18 units before each meals, three times a day. Give 90 day supply    hydroCHLOROthiazide (HYDRODIURIL) 25 MG tablet     ketoconazole (NIZORAL) 2 % cream APPLY CREAM TOPICALLY TO AFFECTED AREA TWICE DAILY FOR 14 DAYS    lancing device Misc One device, used to check blood glucose, ICD-10: E11.9    LANTUS SOLOSTAR U-100 INSULIN glargine 100 units/mL SubQ pen Inject 45 Units into the skin every evening. Give 90 day supply    metFORMIN (GLUCOPHAGE) 1000 MG tablet Take 1 tablet (1,000 mg total) by mouth 2 (two) times daily with meals.    nebulizer and compressor Nika USE TID UTD    omeprazole (PRILOSEC) 40 MG capsule     PROVENTIL HFA 90 mcg/actuation " "inhaler     semaglutide (OZEMPIC) 1 mg/dose (4 mg/3 mL) Inject 1 mg into the skin every 7 days.    triamcinolone acetonide 0.1% (KENALOG) 0.1 % cream Apply topically.    TRUEPLUS LANCETS 33 gauge Misc Apply topically 2 (two) times daily.   Last reviewed on 12/5/2022  4:32 PM by Daquan Isaac MD    Review of patient's allergies indicates:   Allergen Reactions    Codeine Other (See Comments)     "it makes me feel crazy"    Prednisone Palpitations     "it makes my heart beat fast. I can take the shot"    Last reviewed on  12/5/2022 4:32 PM by Daquan Isaac      Tasks added this encounter   No tasks added.   Tasks due within next 3 months   No tasks due.     Francesca Potter, PharmD  [facility]  [address] [place] [id number]  Phone: [phone]  Fax: [phone]        "

## 2022-12-28 NOTE — DISCHARGE INSTRUCTIONS
§ Please return to the Emergency Department for any new or worsening symptoms including: fever, chest pain, shortness of breath, loss of consciousness, dizziness, weakness, or any other concerns.     § Schedule an appointment for follow up with your Primary Care Doctor as soon as possible for a recheck of your symptoms. If you do not have one, contact the one listed on your discharge paperwork or call the [facility] Appointment Desk at [phone] to schedule an appointment.     § Please take your blood pressure medication when you get home. You have been prescribed Fioricet (Butalbital / Acetaminophen / Caffeine) for headaches. Please do not take this medication while working, drinking alcohol, swimming, or while driving/operating heavy machinery. This medication may cause drowsiness, dizziness, impair judgment, and reduce physical capabilities.You should not drive, operate heavy machinery, or make life changing decisions while taking this medication. This medication contains Tylenol. Please do not take any additional Tylenol while you are taking this medication.

## 2022-12-28 NOTE — ED PROVIDER NOTES
"Encounter Date: 12/27/2022    SCRIBE #1 NOTE: I, Fatimah Vázquez, am scribing for, and in the presence of,  SHANNAN Lara. I have scribed the following portions of the note - Other sections scribed: HPI, ROS.     History     Chief Complaint   Patient presents with    Headache     Pt c/o headache after hitting her head on a towel rack while cleaning the shower yesterday; denies loc     Marisol Rosales is a 62 y.o. female who presents to the ED for evaluation of a headache centralized in the back of her head onset yesterday following an injury where she struck her head on a metal grab bar while cleaning her shower. Pt states that she took tylenol for some relief. Denies loss of consciousness, vomiting, blood thinners, trouble walking, or trouble talking. She mentions that she did not take her HTN medications today.    Problem List: Allergy (seasonal), Asthma, Back pain, Diabetes mellitus, Hypertension.     The history is provided by the patient. No  was used.   Review of patient's allergies indicates:   Allergen Reactions    Codeine Other (See Comments)     "it makes me feel crazy"    Prednisone Palpitations     "it makes my heart beat fast. I can take the shot"     Past Medical History:   Diagnosis Date    Allergy     seasonal    Asthma     Back pain     Diabetes mellitus     Hypertension      Past Surgical History:   Procedure Laterality Date    HYSTERECTOMY       History reviewed. No pertinent family history.  Social History     Tobacco Use    Smoking status: Former    Smokeless tobacco: Never   Substance Use Topics    Alcohol use: No    Drug use: No     Review of Systems   Constitutional:  Negative for fever.   HENT:  Negative for congestion and trouble swallowing.         (+) Head Injury   Eyes:  Negative for visual disturbance.   Respiratory:  Negative for cough and shortness of breath.    Cardiovascular:  Negative for chest pain.   Gastrointestinal:  Negative for abdominal pain, nausea and " vomiting.   Musculoskeletal:  Negative for arthralgias, neck pain and neck stiffness.   Skin:  Negative for rash and wound.   Neurological:  Positive for headaches. Negative for syncope.   Psychiatric/Behavioral:  Negative for confusion.    All other systems reviewed and are negative.    Physical Exam     Initial Vitals [12/27/22 2226]   BP Pulse Resp Temp SpO2   (!) 198/101 80 20 98.3 °F (36.8 °C) 97 %      MAP       --         Physical Exam    Nursing note and vitals reviewed.  Constitutional: She appears well-developed and well-nourished. She is not diaphoretic. She is active and cooperative.  Non-toxic appearance. She does not have a sickly appearance. She does not appear ill. No distress.   HENT:   Head: Normocephalic and atraumatic. Head is without raccoon's eyes, without Hernandez's sign, without abrasion, without contusion and without laceration. Hair is normal.       Right Ear: Tympanic membrane and external ear normal. No hemotympanum.   Left Ear: Tympanic membrane and external ear normal. No hemotympanum.   Nose: No mucosal edema, rhinorrhea, septal deviation or nasal septal hematoma. No epistaxis.   Mouth/Throat: Uvula is midline, oropharynx is clear and moist and mucous membranes are normal. No trismus in the jaw. No oropharyngeal exudate, posterior oropharyngeal edema, posterior oropharyngeal erythema or tonsillar abscesses.   Eyes: Conjunctivae are normal. Pupils are equal, round, and reactive to light. Right eye exhibits no discharge. Left eye exhibits no discharge. No scleral icterus.   Neck: Phonation normal. No tracheal deviation present.   Normal range of motion.  Cardiovascular:  Normal rate, regular rhythm and intact distal pulses.           Pulses:       Radial pulses are 2+ on the right side and 2+ on the left side.   Pulmonary/Chest: Breath sounds normal. No accessory muscle usage or stridor. No tachypnea and no bradypnea. No respiratory distress. She has no decreased breath sounds. She has no  wheezes. She has no rhonchi. She has no rales.   Abdominal: She exhibits no distension.   Musculoskeletal:         General: Normal range of motion.      Cervical back: Normal range of motion. No rigidity. No spinous process tenderness or muscular tenderness. Normal range of motion.     Neurological: She is alert and oriented to person, place, and time. She exhibits normal muscle tone. Coordination normal. GCS score is 15. GCS eye subscore is 4. GCS verbal subscore is 5. GCS motor subscore is 6.   Skin: Skin is warm and dry. Capillary refill takes less than 2 seconds. No rash noted. No erythema.   Psychiatric: She has a normal mood and affect. Her speech is normal and behavior is normal. Judgment and thought content normal.       ED Course   Procedures  Labs Reviewed - No data to display       Imaging Results    None          Medications - No data to display  Medical Decision Making:   History:   Old Medical Records: I decided to obtain old medical records.     APC / Resident Notes:   This is an evaluation of a 62 y.o. female that presents to the Emergency Department for headache following head injury yesterday evening. Physical Exam shows a non-toxic, afebrile, and well appearing female.  No external evidence of head trauma or injury.  No hemotympanum.  No septal hematoma.  No midline tenderness over the cervical spine.  There is tenderness over posterior left scalp.  There is a hematoma.  There is no laceration or open wounds.  There is no crepitus.  Ambulates with a steady gait.  Moves all extremities. Vital signs are reassuring. If available, previous records reviewed.   [place] Head CT reviewed without indication for CT.  Discussed with with the patient, she is hesitant, as such, have offered a head CT however the patient declined due to concern about waiting for the results.     My overall impression is headache following head injury. I considered, but at this time, do not suspect skull fracture, intracranial  hemorrhage, vertebral fracture. Blood pressure is elevated however she did not take her blood pressure medication today.  Advised her to take this upon returning home.  Blood pressure is trending down during her stay.    Discharge Meds/Instructions:  Fioricet. The diagnosis, treatment plan, instructions for follow-up as well as ED return precautions were discussed. All questions have been answered.  RODGER Gilliam FNP-C   Scribe Attestation:   Scribe #1: I performed the above scribed service and the documentation accurately describes the services I performed. I attest to the accuracy of the note.                 Scribe attestation: I, RODGER Gilliam, BRANDON, personally performed the services described in this documentation.  All medical record entries made by the scribe were at my direction and in my presence.  I have reviewed the chart and agree that the record reflects my personal performance and is accurate and complete.   Clinical Impression:   Final diagnoses:  [S09.90XA] Injury of head, initial encounter (Primary)  [G44.319] Acute post-traumatic headache, not intractable        ED Disposition Condition    Discharge Stable          ED Prescriptions       Medication Sig Dispense Start Date End Date Auth. Provider    butalbital-acetaminophen-caffeine -40 mg (FIORICET, ESGIC) -40 mg per tablet Take 1 tablet by mouth every 6 (six) hours as needed for Headaches. 12 tablet 12/27/2022 12/30/2022 SHANNAN Vegas          Follow-up Information       Follow up With Specialties Details Why Contact Info    Your Primary Care Doctor  Schedule an appointment as soon as possible for a visit  Please call and schedule an appointment for follow up this week.     [facility] ED Emergency Medicine Go to  If symptoms worsen [address] [address] [phone]             SHANNAN Vegas  12/27/22 1104

## 2022-12-28 NOTE — ED NOTES
Patient hit head on towel rack and denies any LOC but reports throbbing in back of head since incident

## 2023-01-18 ENCOUNTER — OCCUPATIONAL HEALTH (OUTPATIENT)
Dept: URGENT CARE | Facility: CLINIC | Age: 63
End: 2023-01-18
Payer: MEDICARE

## 2023-01-18 DIAGNOSIS — Z13.9 ENCOUNTER FOR SCREENING: Primary | ICD-10-CM

## 2023-01-18 PROCEDURE — 86580 POCT TB SKIN TEST: ICD-10-PCS | Mod: S$GLB,,, | Performed by: EMERGENCY MEDICINE

## 2023-01-18 PROCEDURE — 86580 TB INTRADERMAL TEST: CPT | Mod: S$GLB,,, | Performed by: EMERGENCY MEDICINE

## 2023-01-20 LAB
TB INDURATION - 48 HR READ: 0 MM
TB INDURATION - 72 HR READ: 0 MM
TB SKIN TEST - 48 HR READ: NEGATIVE
TB SKIN TEST - 72 HR READ: NEGATIVE

## 2023-01-25 ENCOUNTER — SPECIALTY PHARMACY (OUTPATIENT)
Dept: PHARMACY | Facility: CLINIC | Age: 63
End: 2023-01-25
Payer: MEDICARE

## 2023-01-25 NOTE — TELEPHONE ENCOUNTER
"Specialty Pharmacy - Refill Coordination    Specialty Medication Orders Linked to Encounter      Flowsheet Row Most Recent Value   Medication #1 abatacept (ORENCIA CLICKJECT) 125 mg/mL AtIn (Order#[id number], Rx#[id number])            Refill Questions - Documented Responses      Flowsheet Row Most Recent Value   Patient Availability and HIPAA Verification    Does patient want to proceed with activity? Yes   HIPAA/medical authority confirmed? Yes   Relationship to patient of person spoken to? Self   Refill Screening Questions    Changes to allergies? No   Changes to medications? No   New conditions since last clinic visit? No   Unplanned office visit, urgent care, ED, or hospital admission in the last 4 weeks? No   How does patient/caregiver feel medication is working? Good   Financial problems or insurance changes? No   How many doses of your specialty medications were missed in the last 4 weeks? 0   Would patient like to speak to a pharmacist? No   When does the patient need to receive the medication? 01/27/23   Refill Delivery Questions    How will the patient receive the medication? Pickup   When does the patient need to receive the medication? 01/27/23   Expected Copay ($) 0   Is the patient able to afford the medication copay? Yes   Payment Method zero copay   Days supply of Refill 28   Supplies needed? No supplies needed   Refill activity completed? Yes   Refill activity plan Refill scheduled   Shipment/Pickup Date: 01/25/23            Current Outpatient Medications   Medication Sig    abatacept (ORENCIA CLICKJECT) 125 mg/mL AtIn Inject 125 mg into the skin every 7 days.    albuterol (PROVENTIL) 2.5 mg /3 mL (0.083 %) nebulizer solution     amLODIPine (NORVASC) 10 MG tablet     ascorbic acid, vitamin C, (VITAMIN C) 1000 MG tablet Take 1,000 mg by mouth once daily.    aspirin (ECOTRIN) 81 MG EC tablet Take 81 mg by mouth once daily.    BD ULTRA-FINE SHORT PEN NEEDLE 31 gauge x 5/16" Ndle Use 1 needles with each " insulin injection, 4 times a day, ICD10: 11.9    betamethasone dipropionate (DIPROLENE) 0.05 % cream     blood sugar diagnostic Strp One test strip use 3 times a day to check blood glucose,  ICD-10: E11.9, compatible with insurance/glucometer    blood-glucose meter kit One glucometer, use to check 3 times a day.   ICD-10: E11.9, Dispense machine covered by insurance    cetirizine (ZYRTEC) 10 MG tablet Take 10 mg by mouth once daily.    clobetasol 0.05% (TEMOVATE) 0.05 % Oint Apply topically 2 (two) times daily. To rash on hands for flares up to 2 weeks    cyanocobalamin, vitamin B-12, (VITAMIN B-12 ORAL) Take by mouth.    DEXCOM G6  Misc 1 , use as directed    DEXCOM G6 SENSOR Nika 1 sensor, change every 10 days    DEXCOM G6 TRANSMITTER Nika 1 Transmitter, use as directed    diclofenac sodium (VOLTAREN) 1 % Gel Apply 2 g topically 4 (four) times daily.    DULoxetine (CYMBALTA) 30 MG capsule two capsules daily    ELDERBERRY FRUIT ORAL Take by mouth.    enalapril (VASOTEC) 20 MG tablet     fluticasone propionate (FLONASE) 50 mcg/actuation nasal spray     gabapentin (NEURONTIN) 100 MG capsule One to two capsules daily    HUMALOG KWIKPEN INSULIN 100 unit/mL pen Give 18 units before each meals, three times a day. Give 90 day supply    hydroCHLOROthiazide (HYDRODIURIL) 25 MG tablet     ketoconazole (NIZORAL) 2 % cream APPLY CREAM TOPICALLY TO AFFECTED AREA TWICE DAILY FOR 14 DAYS    lancing device Misc One device, used to check blood glucose, ICD-10: E11.9    LANTUS SOLOSTAR U-100 INSULIN glargine 100 units/mL SubQ pen Inject 45 Units into the skin every evening. Give 90 day supply    metFORMIN (GLUCOPHAGE) 1000 MG tablet Take 1 tablet (1,000 mg total) by mouth 2 (two) times daily with meals.    nebulizer and compressor Nika USE TID UTD    omeprazole (PRILOSEC) 40 MG capsule     PROVENTIL HFA 90 mcg/actuation inhaler     semaglutide (OZEMPIC) 1 mg/dose (4 mg/3 mL) Inject 1 mg into the skin every 7 days.     "triamcinolone acetonide 0.1% (KENALOG) 0.1 % cream Apply topically.    TRUEPLUS LANCETS 33 gauge Misc Apply topically 2 (two) times daily.   Last reviewed on 12/27/2022 10:27 PM by Nedra Graham RN    Review of patient's allergies indicates:   Allergen Reactions    Codeine Other (See Comments)     "it makes me feel crazy"    Prednisone Palpitations     "it makes my heart beat fast. I can take the shot"    Last reviewed on  12/27/2022 10:27 PM by Nedra Graham      Tasks added this encounter   2/17/2023 - Refill Call (Auto Added)  1/26/2023 - Pickup Reminder   Tasks due within next 3 months   3/3/2023 - Clinical - Follow Up Assesement (Annual)     [facility]  [address]  [facility] [address]  Phone: [phone]  Fax: [phone]        "

## 2023-01-31 ENCOUNTER — HOSPITAL ENCOUNTER (EMERGENCY)
Facility: HOSPITAL | Age: 63
Discharge: HOME | End: 2023-01-31
Attending: EMERGENCY MEDICINE
Payer: MEDICARE

## 2023-01-31 ENCOUNTER — TELEPHONE (OUTPATIENT)
Dept: ENDOCRINOLOGY | Facility: CLINIC | Age: 63
End: 2023-01-31

## 2023-01-31 VITALS
HEIGHT: 64 IN | RESPIRATION RATE: 18 BRPM | WEIGHT: 166 LBS | BODY MASS INDEX: 28.34 KG/M2 | HEART RATE: 79 BPM | TEMPERATURE: 98 F | SYSTOLIC BLOOD PRESSURE: 134 MMHG | OXYGEN SATURATION: 100 % | DIASTOLIC BLOOD PRESSURE: 81 MMHG

## 2023-01-31 DIAGNOSIS — H93.13 TINNITUS, BILATERAL: Primary | ICD-10-CM

## 2023-01-31 LAB
CTP QC/QA: YES
INFLUENZA A ANTIGEN, POC: NEGATIVE
INFLUENZA B ANTIGEN, POC: NEGATIVE
SARS-COV-2 RDRP RESP QL NAA+PROBE: NEGATIVE

## 2023-01-31 PROCEDURE — 99284 EMERGENCY DEPT VISIT MOD MDM: CPT | Mod: 25,ER

## 2023-01-31 PROCEDURE — 87635 SARS-COV-2 COVID-19 AMP PRB: CPT | Mod: ER | Performed by: EMERGENCY MEDICINE

## 2023-01-31 PROCEDURE — 25000003 PHARM REV CODE 250: Mod: ER | Performed by: EMERGENCY MEDICINE

## 2023-01-31 PROCEDURE — 87804 INFLUENZA ASSAY W/OPTIC: CPT | Mod: 59,ER

## 2023-01-31 RX ORDER — LORATADINE 10 MG/1
10 TABLET ORAL DAILY
Qty: 30 TABLET | Refills: 0 | Status: SHIPPED | OUTPATIENT
Start: 2023-01-31 | End: 2024-01-31

## 2023-01-31 RX ORDER — DIPHENHYDRAMINE HCL 25 MG
25 CAPSULE ORAL NIGHTLY PRN
Qty: 20 CAPSULE | Refills: 0 | Status: SHIPPED | OUTPATIENT
Start: 2023-01-31

## 2023-01-31 RX ORDER — MECLIZINE HYDROCHLORIDE 25 MG/1
25 TABLET ORAL
Status: COMPLETED | OUTPATIENT
Start: 2023-01-31 | End: 2023-01-31

## 2023-01-31 RX ORDER — DIPHENHYDRAMINE HCL 25 MG
25 CAPSULE ORAL
Status: DISCONTINUED | OUTPATIENT
Start: 2023-01-31 | End: 2023-01-31 | Stop reason: HOSPADM

## 2023-01-31 RX ORDER — ACETAMINOPHEN 325 MG/1
650 TABLET ORAL
Status: COMPLETED | OUTPATIENT
Start: 2023-01-31 | End: 2023-01-31

## 2023-01-31 RX ADMIN — MECLIZINE HYDROCHLORIDE 25 MG: 25 TABLET ORAL at 06:01

## 2023-01-31 RX ADMIN — ACETAMINOPHEN 650 MG: 325 TABLET ORAL at 06:01

## 2023-01-31 NOTE — TELEPHONE ENCOUNTER
----- Message from Sebas Owen sent at 1/31/2023 12:43 PM CST -----  Type: Patient Call Back    Who called: Self     What is the request in detail: Asking for a call from SHAINA regarding diabet manag- appt     Can the clinic reply by [facility]? No     Would the patient rather a call back or a response via [facility]? Call     Best call back number: [phone] (home)

## 2023-01-31 NOTE — Clinical Note
"Marisol Sierra"Bobby was seen and treated in our emergency department on 1/31/2023.  She may return to work on 02/02/2023.       If you have any questions or concerns, please don't hesitate to call.      Hermelinda Ridley, DO"

## 2023-02-01 NOTE — ED PROVIDER NOTES
"Encounter Date: 1/31/2023    SCRIBE #1 NOTE: I, Tarah Smyth, am scribing for, and in the presence of,  Hermelinda Ridley DO. I have scribed the following portions of the note - Other sections scribed: HPI; ROS; PE.     History     Chief Complaint   Patient presents with    Tinnitus     Pt states ringing in ears bilaterally x1 week pt states ears feel full has headache as well      Marisol Rosales is a 62 y.o. female with Hx of DM and HTN who presents to the ED for chief complaint of bilateral tinnitus onset 1 week. Patient has associated headaches. Patient reports she has a "humming" sound in her ears that causes her to have a headache. She denies associated numbness, tingling, weakness, rhinorrhea, congestions, sore throat, fever, chills, nausea, vomiting, diarrhea, or otalgia. Patient did not attempt treatment at home. No further complaints at this time.       The history is provided by the patient. No  was used.   Review of patient's allergies indicates:   Allergen Reactions    Codeine Other (See Comments)     "it makes me feel crazy"    Prednisone Palpitations     "it makes my heart beat fast. I can take the shot"     Past Medical History:   Diagnosis Date    Allergy     seasonal    Asthma     Back pain     Diabetes mellitus     Hypertension      Past Surgical History:   Procedure Laterality Date    HYSTERECTOMY       No family history on file.  Social History     Tobacco Use    Smoking status: Former    Smokeless tobacco: Never   Substance Use Topics    Alcohol use: No    Drug use: No     Review of Systems   Constitutional:  Negative for chills and fever.   HENT:  Positive for tinnitus. Negative for congestion, ear pain, sore throat and trouble swallowing.    Respiratory:  Negative for cough and shortness of breath.    Cardiovascular:  Negative for chest pain.   Gastrointestinal:  Negative for abdominal pain, constipation, diarrhea, nausea and vomiting.   Genitourinary:  Negative for dysuria, " flank pain, frequency and urgency.   Musculoskeletal:  Negative for back pain.   Skin:  Negative for rash.   Neurological:  Negative for weakness, numbness and headaches.        Negative for tingling.    All other systems reviewed and are negative.    Physical Exam     Initial Vitals   BP Pulse Resp Temp SpO2   01/31/23 1501 01/31/23 1459 01/31/23 1459 01/31/23 1459 01/31/23 1500   (!) 160/85 70 20 98 °F (36.7 °C) 100 %      MAP       --                Patient gave consent to have physical exam performed.    Physical Exam    Nursing note and vitals reviewed.  Constitutional: She appears well-developed and well-nourished.   HENT:   Head: Normocephalic and atraumatic.   Right Ear: External ear normal. No mastoid tenderness. Tympanic membrane is not erythematous.   Left Ear: External ear normal. No mastoid tenderness. Tympanic membrane is not erythematous.   Eyes: Conjunctivae and EOM are normal. Pupils are equal, round, and reactive to light. Right eye exhibits no discharge. Left eye exhibits no discharge.   Neck: Neck supple.   Normal range of motion.  Cardiovascular:  Normal rate, regular rhythm, normal heart sounds and intact distal pulses.     Exam reveals no gallop and no friction rub.       No murmur heard.  Pulmonary/Chest: Breath sounds normal. No respiratory distress. She has no wheezes. She has no rhonchi. She has no rales. She exhibits no tenderness.   Abdominal: Abdomen is soft. Bowel sounds are normal. She exhibits no distension and no mass. There is no abdominal tenderness.   No CVA tenderness There is no rebound and no guarding.   Musculoskeletal:         General: No tenderness or edema. Normal range of motion.      Cervical back: Normal range of motion and neck supple.     Neurological: She is alert and oriented to person, place, and time.   Cranial nerves II-XII intact. Sensation grossly intact. Bilateral  strength equal. Strength 5/5 to all extremities. Deep tendon reflexes normal.     Skin: Skin  is warm and dry.   Psychiatric: She has a normal mood and affect.       ED Course   Procedures  Labs Reviewed   SARS-COV-2 RDRP GENE    Narrative:     This test utilizes isothermal nucleic acid amplification technology to detect the SARS-CoV-2 RdRp nucleic acid segment. The analytical sensitivity (limit of detection) is 500 copies/swab.     A POSITIVE result is indicative of the presence of SARS-CoV-2 RNA; clinical correlation with patient history and other diagnostic information is necessary to determine patient infection status.    A NEGATIVE result means that SARS-CoV-2 nucleic acids are not present above the limit of detection. A NEGATIVE result should be treated as presumptive. It does not rule out the possibility of COVID-19 and should not be the sole basis for treatment decisions. If COVID-19 is strongly suspected based on clinical and exposure history, re-testing using an alternate molecular assay should be considered.     This test is only for use under the Food and Drug Administration s Emergency Use Authorization (EUA).     Commercial kits are provided by [employer]. Performance characteristics of the EUA have been independently verified by [facility] Department of Pathology and Laboratory Medicine.   _________________________________________________________________   The authorized Fact Sheet for Healthcare Providers and the authorized Fact Sheet for Patients of the ID NOW COVID-19 are available on the FDA website:    https://www.fda.gov/media/691415/download      https://www.fda.gov/media/640459/download      POCT RAPID INFLUENZA A/B          Imaging Results              CT Head Without Contrast (Final result)  Result time 01/31/23 18:43:04      Final result by Oscar Brown MD (01/31/23 18:43:04)                   Impression:      1. No acute intracranial abnormalities noting sequela of chronic microvascular ischemic change and senescent change.  2. Fluid within the bilateral  mastoid air cells, left greater than right.  No findings to suggest coalescence.  Correlation is advised in this patient with history of tinnitus.      Electronically signed by: Oscar Brown MD  Date:    01/31/2023  Time:    18:43               Narrative:    EXAMINATION:  CT HEAD WITHOUT CONTRAST    CLINICAL HISTORY:  Headache, new or worsening (Age >= 50y);Symptoms are headache with tinnitus for 1 week;    TECHNIQUE:  Low dose axial images were obtained through the head.  Coronal and sagittal reformations were also performed. Contrast was not administered.    COMPARISON:  None.    FINDINGS:  There is generalized cerebral volume loss.  There is hypoattenuation in a periventricular fashion, likely sequela of chronic microvascular ischemic change.  There is no evidence of acute major vascular territory infarct, hemorrhage, or mass.  There is no hydrocephalus.  There are no abnormal extra-axial fluid collections.  The paranasal sinuses are clear.  No acute displaced calvarial fracture.  There is scattered fluid within the bilateral mastoid air cells, left greater than right.  The visualized soft tissues are unremarkable.                                       Medications   meclizine tablet 25 mg (25 mg Oral Given 1/31/23 1825)   acetaminophen tablet 650 mg (650 mg Oral Given 1/31/23 1824)     Medical Decision Making:   History:   Old Medical Records: I decided to obtain old medical records.  Clinical Tests:   Lab Tests: Reviewed and Ordered  Radiological Study: Ordered and Reviewed     This is an evaluation of a 62 y.o. female that presents to the Emergency Department for   Chief Complaint   Patient presents with    Tinnitus     Pt states ringing in ears bilaterally x1 week pt states ears feel full has headache as well        The patient is a non-toxic and well appearing patient. On physical exam, patient appears well hydrated with moist mucus membranes. Breath sounds are clear and equal bilaterally with no  adventitious breath sounds, tachypnea or respiratory distress. Regular rate and rhythm. No murmurs. Abdomen soft and non tender.   Patient drove herself and wants to be able to drive herself home.  Differential diagnosis: Otitis Media, Otitis Externa, Tinnitus, CVA, Headache, Intractable Headache.     Vital Signs Are Reassuring.   Influenza a negative, influenza B negative.  COVID negative.  CT head shows no acute intracranial abnormalities.  Fluid in bilateral mastoid air cells.  No mastoid tenderness on exam.  Ambulatory referral placed to ENT.  ED Course:Treatment in the ED included Physical Exam and   Medications   meclizine tablet 25 mg (25 mg Oral Given 1/31/23 1825)   acetaminophen tablet 650 mg (650 mg Oral Given 1/31/23 1824)   .   Patient reports feeling better after treatment in the ER.  Patient reports significant decrease in the ringing in her ears after treatment in the ER.  Headache resolved.  Discussed treatment, prescriptions, labs, and imaging results with the patient.    Discharge home with   ED Prescriptions       Medication Sig Dispense Start Date End Date Auth. Provider    loratadine (CLARITIN) 10 mg tablet Take 1 tablet (10 mg total) by mouth once daily. 30 tablet 1/31/2023 1/31/2024 Hermelinda Ridley, DO    sodium chloride (OCEAN NASAL) 0.65 % nasal spray 1 spray by Nasal route every 3 (three) hours as needed for Congestion. 1 each 1/31/2023 -- Hermelinda Ridley, DO    diphenhydrAMINE (BENADRYL) 25 mg capsule Take 1 capsule (25 mg total) by mouth nightly as needed (As needed for tinnitus). 20 capsule 1/31/2023 -- Hermelinda Ridley, DO          Fill and take prescriptions as directed.  Return to the ED if symptoms worsen or do not resolve.   Answered questions and discussed discharge plan.    Patient feels better and is ready for discharge.  Follow up with PCP/specialist in 1 day       Scribe Attestation:   Scribe #1: I performed the above scribed service and the documentation accurately describes the services  I performed. I attest to the accuracy of the note.             I, Dr. Hermelinda Ridley, personally performed the services described in this documentation. This document was produced by a scribe under my direction and in my presence. All medical record entries made by the scribe were at my direction and in my presence.  I have reviewed the chart and agree that the record reflects my personal performance and is accurate and complete. Hermelidna Ridley, .     02/01/2023 4:49 PM         Clinical Impression:   Final diagnoses:  [H93.13] Tinnitus, bilateral (Primary)        ED Disposition Condition    Discharge Stable          ED Prescriptions       Medication Sig Dispense Start Date End Date Auth. Provider    loratadine (CLARITIN) 10 mg tablet Take 1 tablet (10 mg total) by mouth once daily. 30 tablet 1/31/2023 1/31/2024 Hermelinda Ridley, DO    sodium chloride (OCEAN NASAL) 0.65 % nasal spray 1 spray by Nasal route every 3 (three) hours as needed for Congestion. 1 each 1/31/2023 -- Hermelinda Ridley DO    diphenhydrAMINE (BENADRYL) 25 mg capsule Take 1 capsule (25 mg total) by mouth nightly as needed (As needed for tinnitus). 20 capsule 1/31/2023 -- Hermelinda Ridley DO          Follow-up Information       Follow up With Specialties Details Why Contact Info Additional Information    [facility] Schedule an appointment as soon as possible for a visit in 1 day  [address]  [place] [id number]  [phone]       [facility] - Otolaryngology Otolaryngology Schedule an appointment as soon as possible for a visit in 1 day  [address] [facility] [address]  [facility] [id number] [phone] Please park in garage or surface lot and use Medical Office Bldg entrance. Check in at [address].     [facility] - Emergency Dept Emergency Medicine Go to  Please go to [facility] emergency department if symptoms worsen [address]  [facility] [id number] [phone]              Hermelinda Ridley  DO  02/01/23 1649

## 2023-02-17 ENCOUNTER — SPECIALTY PHARMACY (OUTPATIENT)
Dept: PHARMACY | Facility: CLINIC | Age: 63
End: 2023-02-17
Payer: MEDICARE

## 2023-02-17 NOTE — TELEPHONE ENCOUNTER
"Specialty Pharmacy - Refill Coordination    Specialty Medication Orders Linked to Encounter      Flowsheet Row Most Recent Value   Medication #1 abatacept (ORENCIA CLICKJECT) 125 mg/mL AtIn (Order#[id number], Rx#[id number])            Refill Questions - Documented Responses      Flowsheet Row Most Recent Value   Patient Availability and HIPAA Verification    Does patient want to proceed with activity? Yes   HIPAA/medical authority confirmed? Yes   Relationship to patient of person spoken to? Self   Refill Screening Questions    Changes to allergies? No   Changes to medications? No   New conditions since last clinic visit? No   Unplanned office visit, urgent care, ED, or hospital admission in the last 4 weeks? Yes  [tinnitus]   How does patient/caregiver feel medication is working? Very good   Financial problems or insurance changes? No   How many doses of your specialty medications were missed in the last 4 weeks? 0   Would patient like to speak to a pharmacist? No   When does the patient need to receive the medication? 02/24/23   Refill Delivery Questions    How will the patient receive the medication? Pickup   When does the patient need to receive the medication? 02/24/23   Expected Copay ($) 0   Is the patient able to afford the medication copay? Yes   Payment Method zero copay   Days supply of Refill 28   Supplies needed? No supplies needed   Refill activity completed? Yes   Refill activity plan Refill scheduled   Shipment/Pickup Date: 02/22/23            Current Outpatient Medications   Medication Sig    abatacept (ORENCIA CLICKJECT) 125 mg/mL AtIn Inject 125 mg into the skin every 7 days.    albuterol (PROVENTIL) 2.5 mg /3 mL (0.083 %) nebulizer solution     amLODIPine (NORVASC) 10 MG tablet     ascorbic acid, vitamin C, (VITAMIN C) 1000 MG tablet Take 1,000 mg by mouth once daily.    aspirin (ECOTRIN) 81 MG EC tablet Take 81 mg by mouth once daily.    BD ULTRA-FINE SHORT PEN NEEDLE 31 gauge x 5/16" Ndle Use 1 " needles with each insulin injection, 4 times a day, ICD10: 11.9    betamethasone dipropionate (DIPROLENE) 0.05 % cream     blood sugar diagnostic Strp One test strip use 3 times a day to check blood glucose,  ICD-10: E11.9, compatible with insurance/glucometer    blood-glucose meter kit One glucometer, use to check 3 times a day.   ICD-10: E11.9, Dispense machine covered by insurance    cetirizine (ZYRTEC) 10 MG tablet Take 10 mg by mouth once daily.    clobetasol 0.05% (TEMOVATE) 0.05 % Oint Apply topically 2 (two) times daily. To rash on hands for flares up to 2 weeks    cyanocobalamin, vitamin B-12, (VITAMIN B-12 ORAL) Take by mouth.    DEXCOM G6  Misc 1 , use as directed    DEXCOM G6 SENSOR Nika 1 sensor, change every 10 days    DEXCOM G6 TRANSMITTER Nika 1 Transmitter, use as directed    diclofenac sodium (VOLTAREN) 1 % Gel Apply 2 g topically 4 (four) times daily.    diphenhydrAMINE (BENADRYL) 25 mg capsule Take 1 capsule (25 mg total) by mouth nightly as needed (As needed for tinnitus).    DULoxetine (CYMBALTA) 30 MG capsule two capsules daily    ELDERBERRY FRUIT ORAL Take by mouth.    enalapril (VASOTEC) 20 MG tablet     fluticasone propionate (FLONASE) 50 mcg/actuation nasal spray     gabapentin (NEURONTIN) 100 MG capsule One to two capsules daily    HUMALOG KWIKPEN INSULIN 100 unit/mL pen Give 18 units before each meals, three times a day. Give 90 day supply    hydroCHLOROthiazide (HYDRODIURIL) 25 MG tablet     ketoconazole (NIZORAL) 2 % cream APPLY CREAM TOPICALLY TO AFFECTED AREA TWICE DAILY FOR 14 DAYS    lancing device Misc One device, used to check blood glucose, ICD-10: E11.9    LANTUS SOLOSTAR U-100 INSULIN glargine 100 units/mL SubQ pen Inject 45 Units into the skin every evening. Give 90 day supply    loratadine (CLARITIN) 10 mg tablet Take 1 tablet (10 mg total) by mouth once daily.    metFORMIN (GLUCOPHAGE) 1000 MG tablet Take 1 tablet (1,000 mg total) by mouth 2 (two) times  "daily with meals.    nebulizer and compressor Nika USE TID UTD    omeprazole (PRILOSEC) 40 MG capsule     PROVENTIL HFA 90 mcg/actuation inhaler     semaglutide (OZEMPIC) 1 mg/dose (4 mg/3 mL) Inject 1 mg into the skin every 7 days.    sodium chloride (OCEAN NASAL) 0.65 % nasal spray 1 spray by Nasal route every 3 (three) hours as needed for Congestion.    triamcinolone acetonide 0.1% (KENALOG) 0.1 % cream Apply topically.    TRUEPLUS LANCETS 33 gauge Misc Apply topically 2 (two) times daily.   Last reviewed on 1/31/2023  6:04 PM by Hermelinda Ridley, DO    Review of patient's allergies indicates:   Allergen Reactions    Codeine Other (See Comments)     "it makes me feel crazy"    Prednisone Palpitations     "it makes my heart beat fast. I can take the shot"    Last reviewed on  1/31/2023 6:04 PM by Hermelidna Ridley      Tasks added this encounter   3/17/2023 - Refill Call (Auto Added)  2/23/2023 - Pickup Reminder   Tasks due within next 3 months   3/3/2023 - Clinical - Follow Up Assesement (Annual)     Zoya Mario, PharmD  [facility]  [address]  [facility] [address]  Phone: [phone]  Fax: [phone]        "

## 2023-02-22 RX ORDER — ABATACEPT 125 MG/ML
125 INJECTION, SOLUTION SUBCUTANEOUS
Qty: 4 ML | Refills: 1 | Status: SHIPPED | OUTPATIENT
Start: 2023-02-22 | End: 2023-04-19

## 2023-02-22 NOTE — TELEPHONE ENCOUNTER
Spoke with pt- [facility] Rx on file at [facility] .  Reaching out to provider for new Rx.  Sent secure chat. Will call pt  when [facility] gets new Rx.

## 2023-02-26 ENCOUNTER — HOSPITAL ENCOUNTER (EMERGENCY)
Facility: HOSPITAL | Age: 63
Discharge: HOME | End: 2023-02-26
Attending: EMERGENCY MEDICINE
Payer: MEDICARE

## 2023-02-26 VITALS
BODY MASS INDEX: 29.02 KG/M2 | OXYGEN SATURATION: 100 % | TEMPERATURE: 98 F | SYSTOLIC BLOOD PRESSURE: 176 MMHG | HEART RATE: 74 BPM | RESPIRATION RATE: 18 BRPM | DIASTOLIC BLOOD PRESSURE: 88 MMHG | WEIGHT: 170 LBS | HEIGHT: 64 IN
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
## 2023-02-26 DIAGNOSIS — S82.62XA CLOSED FRACTURE OF DISTAL LATERAL MALLEOLUS OF LEFT FIBULA, INITIAL ENCOUNTER: Primary | ICD-10-CM

## 2023-02-26 PROCEDURE — 25000003 PHARM REV CODE 250: Mod: ER | Performed by: NURSE PRACTITIONER

## 2023-02-26 PROCEDURE — 99284 EMERGENCY DEPT VISIT MOD MDM: CPT | Mod: 25,ER

## 2023-02-26 RX ORDER — METHOCARBAMOL 750 MG/1
750 TABLET, FILM COATED ORAL 3 TIMES DAILY PRN
Qty: 15 TABLET | Refills: 0 | Status: SHIPPED | OUTPATIENT
Start: 2023-02-26 | End: 2023-02-26 | Stop reason: SDUPTHER

## 2023-02-26 RX ORDER — METHOCARBAMOL 500 MG/1
500 TABLET, FILM COATED ORAL
Status: COMPLETED | OUTPATIENT
Start: 2023-02-26 | End: 2023-02-26

## 2023-02-26 RX ORDER — NAPROXEN 500 MG/1
500 TABLET ORAL 2 TIMES DAILY WITH MEALS
Qty: 10 TABLET | Refills: 0 | Status: SHIPPED | OUTPATIENT
Start: 2023-02-26 | End: 2023-02-26 | Stop reason: SDUPTHER

## 2023-02-26 RX ORDER — NAPROXEN 500 MG/1
500 TABLET ORAL 2 TIMES DAILY WITH MEALS
Qty: 10 TABLET | Refills: 0 | Status: SHIPPED | OUTPATIENT
Start: 2023-02-26 | End: 2023-03-03

## 2023-02-26 RX ORDER — NAPROXEN 250 MG/1
500 TABLET ORAL
Status: COMPLETED | OUTPATIENT
Start: 2023-02-26 | End: 2023-02-26

## 2023-02-26 RX ORDER — HYDROCODONE BITARTRATE AND ACETAMINOPHEN 5; 325 MG/1; MG/1
1 TABLET ORAL EVERY 8 HOURS PRN
Qty: 9 TABLET | Refills: 0 | Status: SHIPPED | OUTPATIENT
Start: 2023-02-26 | End: 2023-03-01

## 2023-02-26 RX ORDER — METHOCARBAMOL 750 MG/1
750 TABLET, FILM COATED ORAL 3 TIMES DAILY PRN
Qty: 15 TABLET | Refills: 0 | Status: SHIPPED | OUTPATIENT
Start: 2023-02-26 | End: 2023-03-03

## 2023-02-26 RX ORDER — HYDROCODONE BITARTRATE AND ACETAMINOPHEN 5; 325 MG/1; MG/1
1 TABLET ORAL EVERY 8 HOURS PRN
Qty: 9 TABLET | Refills: 0 | Status: SHIPPED | OUTPATIENT
Start: 2023-02-26 | End: 2023-02-26 | Stop reason: SDUPTHER

## 2023-02-26 RX ADMIN — NAPROXEN 500 MG: 250 TABLET ORAL at 07:02

## 2023-02-26 RX ADMIN — METHOCARBAMOL 500 MG: 500 TABLET ORAL at 07:02

## 2023-02-27 NOTE — ED PROVIDER NOTES
"Source of History:  Patient    Chief complaint:  Fall (Fall today, tripped. Reports left knee pain, left left shoulder pain, left neck pain, and left foot pain. )      HPI:  Marisol Rosales is a 62 y.o. female presenting with  left knee pain and left foot pain after a trip and fall at [employer] today.  Patient states she was walking down the aisle and tripped on something on the floor landing on her left leg.  Patient states she had to be lifted up by employees because she was unable to stand.  Patient did drive herself to the ED.  Patient denies taking anything for her pain.    This is the extent to the patients complaints today here in the emergency department.    ROS: As per HPI and below:  Constitutional: No fever.  No chills.  Eyes: No visual changes.   ENT: No sore throat. No ear pain.  Urinary: No abnormal urination.  MSK:  Positive for left knee pain.  Positive for left foot pain.  Integument: No rashes or lesions.    Review of patient's allergies indicates:   Allergen Reactions    Codeine Other (See Comments)     "it makes me feel crazy"    Prednisone Palpitations     "it makes my heart beat fast. I can take the shot"       PMH:  As per HPI and below:  Past Medical History:   Diagnosis Date    Allergy     seasonal    Asthma     Back pain     Diabetes mellitus     Hypertension      Past Surgical History:   Procedure Laterality Date    HYSTERECTOMY         Social History     Tobacco Use    Smoking status: Former    Smokeless tobacco: Never   Substance Use Topics    Alcohol use: No    Drug use: No       Physical Exam:    BP (!) 176/88 (BP Location: Right arm, Patient Position: Sitting)   Pulse 74   Temp 98 °F (36.7 °C) (Oral)   Resp 18   Ht 5' 4" (1.626 m)   Wt 77.1 kg (170 lb)   SpO2 100%   BMI 29.18 kg/m²   Nursing note and vital signs reviewed.  Constitutional: No acute distress.  Nontoxic  Head:  Normocephalic atraumatic  Eyes: No conjunctival injection.  Extraocular muscles are intact.  ENT: Normal " phonation.  Musculoskeletal:  Tenderness palpation to left patella.  No MCL or ACL tenderness to palpation.  Negative drawer test.  Good extension flexion with mild pain.  Tenderness palpation over dorsal aspect of left foot.  Good extension flexion of left ankle.  Strength 5/5.  No bruising or ecchymosis noted.  No swelling appreciated.  Skin: No rashes seen.  Good turgor.  No abrasions.  No ecchymoses.  Psych: Appropriate, conversant.    MDM    Emergent evaluation of a 61 yo female presenting for left knee and foot pain after a trip and fall at [employer].  On exam pt is A&Ox3. VSS. Nonfebrile and nontoxic appearing.  Mucous membranes pink and moist. Tenderness palpation to left patella.  No MCL or ACL tenderness to palpation.  Negative drawer test.  Good extension flexion with mild pain.  Tenderness palpation over dorsal aspect of left foot.  Good extension flexion of left ankle.  Strength 5/5.  No bruising or ecchymosis noted.  No swelling appreciated.  Pt speaking in full sentences.  Steady gait appreciated. Cap refill < 3 seconds.      History Acquisition   Additional history was not acquired from other historians.    The patient's list of active medical problems, social history, medications, and allergies as documented per RN staff has been reviewed.     Differential Diagnoses   Based on available information and the initial assessment, the working differential diagnoses considered during this evaluation include but are not limited to sprain, strain, contusion, abrasion, fracture, dislocation, others.    I will get imaging, medicate and reassess.      LABS   Labs Reviewed - No data to display      Imaging     Imaging Results              X-Ray Knee 3 View Left (Final result)  Result time 02/26/23 20:25:09      Final result by Neris Tillman MD (02/26/23 20:25:09)                   Impression:      No acute osseous abnormality identified.      Electronically signed by: Neris Tillman  MD  Date:    02/26/2023  Time:    20:25               Narrative:    EXAMINATION:  XR KNEE 3 VIEW LEFT    CLINICAL HISTORY:  Unspecified fall, initial encounter    TECHNIQUE:  AP, lateral, and Merchant views of the left knee were performed.    COMPARISON:  July 2021.    FINDINGS:  No evidence of acute displaced fracture, dislocation, or osseous destructive process.  Joint spaces are preserved.  There may be small amount of suprapatellar joint fluid.                                       X-Ray Foot Complete Left (Final result)  Result time 02/26/23 20:24:13      Final result by Neris Tillman MD (02/26/23 20:24:13)                   Impression:      See above.      Electronically signed by: Neris Tillman MD  Date:    02/26/2023  Time:    20:24               Narrative:    EXAMINATION:  XR ANKLE COMPLETE 3 VIEW LEFT; XR FOOT COMPLETE 3 VIEW LEFT    CLINICAL HISTORY:  left foot injury;    TECHNIQUE:  AP, lateral and oblique views of the left ankle were performed.  Left foot three views.    COMPARISON:  None    FINDINGS:  Somewhat irregular, obliquely oriented lucency is seen at the lateral aspect of the ankle over the distal fibula lateral malleolus.  Uncertain if this could represent acute fracture in the setting of trauma.  No additional acute displaced fractures or dislocation seen.    Ankle mortise is maintained.  Degenerative changes and spurring are seen in the midfoot.  There is posterior calcaneal spurring.  Lisfranc articulation appears congruent.  No radiopaque retained foreign body seen.  Mild soft tissue swelling is seen about the ankle.                                       X-Ray Ankle Complete Left (Final result)  Result time 02/26/23 20:24:13      Final result by Neris Tillman MD (02/26/23 20:24:13)                   Impression:      See above.      Electronically signed by: Neris Tillman MD  Date:    02/26/2023  Time:    20:24               Narrative:    EXAMINATION:  XR ANKLE COMPLETE 3 VIEW  LEFT; XR FOOT COMPLETE 3 VIEW LEFT    CLINICAL HISTORY:  left foot injury;    TECHNIQUE:  AP, lateral and oblique views of the left ankle were performed.  Left foot three views.    COMPARISON:  None    FINDINGS:  Somewhat irregular, obliquely oriented lucency is seen at the lateral aspect of the ankle over the distal fibula lateral malleolus.  Uncertain if this could represent acute fracture in the setting of trauma.  No additional acute displaced fractures or dislocation seen.    Ankle mortise is maintained.  Degenerative changes and spurring are seen in the midfoot.  There is posterior calcaneal spurring.  Lisfranc articulation appears congruent.  No radiopaque retained foreign body seen.  Mild soft tissue swelling is seen about the ankle.                                      A radiology report was available for my review at the time of the encounter.    EKG        Additional Consideration   All available testing was considered during the course of this workup.  Comorbidities taken into consideration during the patient's evaluation and treatment include weight, age.    Social determinants of health were taken into consideration during development of our treatment plan.    Medications   methocarbamoL tablet 500 mg (500 mg Oral Given 2/26/23 1955)   naproxen tablet 500 mg (500 mg Oral Given 2/26/23 1955)      ED Course as of 02/26/23 2046   Sun Feb 26, 2023 2040 X-rays reviewed by myself and radiology.  Left knee x-ray negative for any acute abnormality.  Left ankle x-ray shows possible distal fibula lateral malleolus fracture.  Will place patient in walking boot.  Will discharge home with medications to help with pain.  Orthopedic referral placed.  Patient advised ice and elevation.  Patient given strict return to ED precautions.  Patient verbalized understanding of this plan of care.  All questions and concerns addressed. [RZ]   2046 Patient is hemodynamically stable, vital signs are normal. Discharge  instructions given. Return to ED precautions discussed. Follow up as directed. Pt verbalized understanding of this plan.  Pt is stable for discharge.  [RZ]      ED Course User Index  [RZ] Lona Ramon NP             CLINICAL IMPRESSION  1. Closed fracture of distal lateral malleolus of left fibula, initial encounter    2. Fall         ED Disposition Condition    Discharge Stable            Instruction:  I see no indication of an emergent process beyond that addressed during our encounter but have duly counseled the patient/family regarding the need for prompt follow-up as well as the indications that should prompt immediate return to the emergency room should new or worrisome developments occur.  The patient/family has been provided with verbal and printed direction regarding our final diagnosis(es) as well as instructions regarding use of OTC and/or Rx medications intended to manage the patient's aforementioned conditions including:  ED Prescriptions       Medication Sig Dispense Start Date End Date Auth. Provider    naproxen (NAPROSYN) 500 MG tablet Take 1 tablet (500 mg total) by mouth 2 (two) times daily with meals. for 5 days 10 tablet 2/26/2023 3/3/2023 Lona Ramon NP    methocarbamoL (ROBAXIN) 750 MG Tab Take 1 tablet (750 mg total) by mouth 3 (three) times daily as needed (muscle strain). 15 tablet 2/26/2023 3/3/2023 Lona Ramon NP    HYDROcodone-acetaminophen (NORCO) 5-325 mg per tablet Take 1 tablet by mouth every 8 (eight) hours as needed for Pain. 9 tablet 2/26/2023 3/1/2023 Lona Ramon NP          Patient has been advised of following recommended follow-up instructions:  Follow-up Information       Follow up With Specialties Details Why Contact Info    [facility] Schedule an appointment as soon as possible for a visit  As needed [address] [place] [id number]  [phone]      Hawk Smith III, MD Orthopedic Surgery Schedule an appointment as  soon as possible for a visit   [address] [place] [id number] [phone]            The patient/family communicates understanding of all this information and all remaining questions and concerns were addressed at this time.      The patient's condition did warrant review of the  and prescription of controlled substances.         Lona Ramon NP  02/26/23 [id number]

## 2023-02-27 NOTE — DISCHARGE INSTRUCTIONS
You were seen and evaluated in the ER today.  Your x-ray shows that you have a possible fraction to your left ankle.  We are going to prescribe you medications to help pain.  We have placed you in a walking boot.  We have placed a referral for orthopedic follow-up.  You can use ice and elevation to help with pain and swelling.  Please follow-up with your PCP as needed.  Please return to the ED for any worsening symptoms such as chest pain, shortness of breath, fever not controlled with Tylenol or ibuprofen or uncontrolled pain.      Our goal in the emergency department is to always give you outstanding care and exceptional service. You may receive a survey by mail or e-mail in the next week regarding your experience in our ED. We would greatly appreciate your completing and returning the survey. Your feedback provides us with a way to recognize our staff who give very good care and it helps us learn how to improve when your experience was below our aspiration of excellence.

## 2023-03-01 ENCOUNTER — PATIENT MESSAGE (OUTPATIENT)
Dept: RHEUMATOLOGY | Facility: CLINIC | Age: 63
End: 2023-03-01
Payer: MEDICARE

## 2023-03-06 ENCOUNTER — OFFICE VISIT (OUTPATIENT)
Dept: ENDOCRINOLOGY | Facility: CLINIC | Age: 63
End: 2023-03-06
Payer: MEDICARE

## 2023-03-06 VITALS
BODY MASS INDEX: 29.39 KG/M2 | SYSTOLIC BLOOD PRESSURE: 140 MMHG | TEMPERATURE: 98 F | DIASTOLIC BLOOD PRESSURE: 72 MMHG | WEIGHT: 171.19 LBS | HEART RATE: 79 BPM

## 2023-03-06 DIAGNOSIS — Z79.4 TYPE 2 DIABETES MELLITUS WITH HYPERGLYCEMIA, WITH LONG-TERM CURRENT USE OF INSULIN: ICD-10-CM

## 2023-03-06 DIAGNOSIS — M05.742 RHEUMATOID ARTHRITIS INVOLVING BOTH HANDS WITH POSITIVE RHEUMATOID FACTOR: ICD-10-CM

## 2023-03-06 DIAGNOSIS — E11.65 TYPE 2 DIABETES MELLITUS WITH HYPERGLYCEMIA, WITH LONG-TERM CURRENT USE OF INSULIN: ICD-10-CM

## 2023-03-06 DIAGNOSIS — M05.741 RHEUMATOID ARTHRITIS INVOLVING BOTH HANDS WITH POSITIVE RHEUMATOID FACTOR: ICD-10-CM

## 2023-03-06 DIAGNOSIS — E66.09 CLASS 1 OBESITY DUE TO EXCESS CALORIES WITH SERIOUS COMORBIDITY AND BODY MASS INDEX (BMI) OF 30.0 TO 30.9 IN ADULT: ICD-10-CM

## 2023-03-06 PROCEDURE — 99214 PR OFFICE/OUTPT VISIT, EST, LEVL IV, 30-39 MIN: ICD-10-PCS | Mod: S$GLB,,, | Performed by: HOSPITALIST

## 2023-03-06 PROCEDURE — 99214 OFFICE O/P EST MOD 30 MIN: CPT | Mod: S$GLB,,, | Performed by: HOSPITALIST

## 2023-03-06 PROCEDURE — 3008F PR BODY MASS INDEX (BMI) DOCUMENTED: ICD-10-PCS | Mod: CPTII,S$GLB,, | Performed by: HOSPITALIST

## 2023-03-06 PROCEDURE — 1159F MED LIST DOCD IN RCRD: CPT | Mod: CPTII,S$GLB,, | Performed by: HOSPITALIST

## 2023-03-06 PROCEDURE — 1159F PR MEDICATION LIST DOCUMENTED IN MEDICAL RECORD: ICD-10-PCS | Mod: CPTII,S$GLB,, | Performed by: HOSPITALIST

## 2023-03-06 PROCEDURE — 3078F PR MOST RECENT DIASTOLIC BLOOD PRESSURE < 80 MM HG: ICD-10-PCS | Mod: CPTII,S$GLB,, | Performed by: HOSPITALIST

## 2023-03-06 PROCEDURE — 3078F DIAST BP <80 MM HG: CPT | Mod: CPTII,S$GLB,, | Performed by: HOSPITALIST

## 2023-03-06 PROCEDURE — 99999 PR PBB SHADOW E&M-EST. PATIENT-LVL V: ICD-10-PCS | Mod: PBBFAC,,, | Performed by: HOSPITALIST

## 2023-03-06 PROCEDURE — 3008F BODY MASS INDEX DOCD: CPT | Mod: CPTII,S$GLB,, | Performed by: HOSPITALIST

## 2023-03-06 PROCEDURE — 3077F PR MOST RECENT SYSTOLIC BLOOD PRESSURE >= 140 MM HG: ICD-10-PCS | Mod: CPTII,S$GLB,, | Performed by: HOSPITALIST

## 2023-03-06 PROCEDURE — 3077F SYST BP >= 140 MM HG: CPT | Mod: CPTII,S$GLB,, | Performed by: HOSPITALIST

## 2023-03-06 PROCEDURE — 99999 PR PBB SHADOW E&M-EST. PATIENT-LVL V: CPT | Mod: PBBFAC,,, | Performed by: HOSPITALIST

## 2023-03-06 NOTE — ASSESSMENT & PLAN NOTE
- Body mass index is 29.39 kg/m².  - dietary discussion as above  - continue to monitor weight, weight loss noted  - continue Ozempic 1.0 mg once a week

## 2023-03-06 NOTE — ASSESSMENT & PLAN NOTE
- Diabetes is not at goal, but with improvement given given most current A1C , Goal A1C for patient is 7%  - Complicated by hyperglycemia, dietary indiscretion, missed insulin dose  - Rare hypoglycemia noted.  Due to too much insulin administration reported, not seen on Dexcom  - Diabetic supplies/medications reviewed this visit to ensure continue refills and compliance  - Advised patient to get periodic eye and feet exam.     Plan  - multiple cancellation/no-show for diabetes Education, unable to set up Dexcom.  My nurse this set up Dexcom in clinic today  - due to the lack of data, we will continueLantus 45 units nightly, advised patient to given at 8:00 p.m. nightly  - continue Humalog 18 units pending carbohydrates intake, 3 times a day before meals  - continue Ozempic 1.0mg once a week injection  - Continue meformin 1000mg twice a day with food  - Advised pt to check glucoses regularly, asked to filled glucose log and bring back for review at next office visit  - Follow up as scheduled with lab work prior

## 2023-03-06 NOTE — PROGRESS NOTES
Subjective:      Patient ID: Marisol Rosales is a 62 y.o. female presented to [facility] on 3/6/2023.  Chief Complaint:  Diabetes      History of Present Illness: Marisol Rosales is a 62 y.o. female here for type 2 diabetes  Other significant past medical history: RA (follow with rheum), HTN, Obesity    With regards to Diabetes Mellitus Type 2  - Known diabetic complications: peripheral neuropathy  - Diagnosed w/ DM: >15 years ago, on insulin for >8 years      Interval history:  Here for follow-up type 2 diabetes.  Patient was able to get Dexcom.  Unfortunately did not go to CGM session with diabetes educator.  Attempted to set up Dexcom G6, at home by herself, failed. Currently not using Dexcom G6.  Did not do lab work.  Current weight:  186>175>173>168>171  Inconsistent insulin administration reported.  High carbohydrate diet reported.  Did not bring diet log for review      Current meds:    Lantus 45 units nightly  Humalog 18 units  3 times a day before meals  Ozempic 1.0mg once a week injection  meformin 1000mg twice a day     Misses medication doses - yes, forget humalog  Injection Technique: Good  Rotation of injection site: Yes   Previous meds:              Glipizide  Home glucose checks: checks 2x a day, Logs reviewed/Unavailable: oral recall: 200s-300s   Hypoglycemia: denies  Diet/Exercise:               Eating 2x meals per day               Snacking, craving sweets              Drink: water, coke zero  Weight trend: decreasing steadily  Diabetes Education: No  Diabetes Related Hospitalization:  No  Hx of pancreatitis, hx of thyroid cancer: No  Family history of diabetes: Yes, grandmother and brother  Occupation: not working, disabled     Eye exam current (within one year): yes, DR: no  Reports cuts or ulcers on feet: yes 4/4/2022, Denies  Statin: Not taking, ACE/ARB: Taking    Diabetes lab work  Lab Results   Component Value Date    HGBA1C 9.8 (H) 11/28/2022    HGBA1C 10.5 (H) 08/22/2022     HGBA1C 11.6 (H) 05/16/2022     Lab Results   Component Value Date    CPEPTIDE 4.62 11/28/2022      No results found for: FRUCTOSAMINE  Lab Results   Component Value Date    MICALBCREAT 9.6 08/22/2022     No results found for: [id number]    Diabetes Management Status: Reviewed this office visit  Screening or Prevention Patient's value Goal Complete/Controlled?   Lipid profile : 08/22/2022 Annually Yes     Dilated retinal exam : 03/02/2022 Annually Yes     Foot exam   : 06/27/2022 Annually Yes          Reviewed past surgical, medical, family, social history and updated as appropriate.  Review of Systems: see HPI above  Objective:   BP (!) 140/72 (BP Location: Left arm)   Pulse 79   Temp 98 °F (36.7 °C) (Oral)   Wt 77.7 kg (171 lb 3.2 oz)   BMI 29.39 kg/m²     Body mass index is 29.39 kg/m².  Vital signs reviewed    Physical Exam  Vitals and nursing note reviewed.   Constitutional:       Appearance: Normal appearance. She is well-developed. She is not ill-appearing.   Neck:      Thyroid: No thyromegaly.   Pulmonary:      Effort: Pulmonary effort is normal. No respiratory distress.   Musculoskeletal:         General: Normal range of motion.      Cervical back: Normal range of motion.   Neurological:      General: No focal deficit present.      Mental Status: She is alert. Mental status is at baseline.   Psychiatric:         Mood and Affect: Mood normal.         Behavior: Behavior normal.     Lab Reviewed:   Lab Results   Component Value Date    HGBA1C 9.8 (H) 11/28/2022     Lab Results   Component Value Date    CHOL 195 08/22/2022    HDL 57 08/22/2022    LDLCALC 124.2 08/22/2022    TRIG 69 08/22/2022    CHOLHDL 29.2 08/22/2022     Lab Results   Component Value Date     11/28/2022    K 3.9 11/28/2022     11/28/2022    CO2 29 11/28/2022     (H) 11/28/2022    BUN 17 11/28/2022    CREATININE 0.8 11/28/2022    CALCIUM 9.3 11/28/2022    PROT 7.3 08/15/2022    ALBUMIN 3.7 08/15/2022    BILITOT 0.3  08/15/2022    ALKPHOS 116 08/15/2022    AST 15 08/15/2022    ALT 18 08/15/2022    ANIONGAP 7 (L) 11/28/2022    ESTGFRAFRICA >60 05/16/2022    EGFRNONAA >60 05/16/2022    TSH 1.524 08/15/2022    [id number] 31 11/28/2022     Assessment     1. Type 2 diabetes mellitus with hyperglycemia, with long-term current use of insulin        2. Class 1 obesity due to excess calories with serious comorbidity and body mass index (BMI) of 30.0 to 30.9 in adult        3. Rheumatoid arthritis involving both hands with positive rheumatoid factor            Plan     Type 2 diabetes mellitus with hyperglycemia, with long-term current use of insulin  - Diabetes is not at goal, but with improvement given given most current A1C , Goal A1C for patient is 7%  - Complicated by hyperglycemia, dietary indiscretion, missed insulin dose  - Rare hypoglycemia noted.  Due to too much insulin administration reported, not seen on Dexcom  - Diabetic supplies/medications reviewed this visit to ensure continue refills and compliance  - Advised patient to get periodic eye and feet exam.     Plan  - multiple cancellation/no-show for diabetes Education, unable to set up Dexcom.  My nurse this set up Dexcom in clinic today  - due to the lack of data, we will continueLantus 45 units nightly, advised patient to given at 8:00 p.m. nightly  - continue Humalog 18 units pending carbohydrates intake, 3 times a day before meals  - continue Ozempic 1.0mg once a week injection  - Continue meformin 1000mg twice a day with food  - Advised pt to check glucoses regularly, asked to filled glucose log and bring back for review at next office visit  - Follow up as scheduled with lab work prior    Class 1 obesity due to excess calories with serious comorbidity and body mass index (BMI) of 30.0 to 30.9 in adult  - Body mass index is 29.39 kg/m².  - dietary discussion as above  - continue to monitor weight, weight loss noted  - continue Ozempic 1.0 mg once a  week    Rheumatoid arthritis involving both hands with positive rheumatoid factor  - continue management by Rheumatology, not on steroid    Advised patient to follow up with PCP for routine health maintenance care.   RTC in 3 months    Daquan Isaac M.D.  Endocrinology  [facility]  3/6/2023      Disclaimer: This note has been generated in part with the use of voice-recognition software. There may be typographical errors that have been missed during proof-reading.  -------------------------------------------------------------------------------------------------  Indications for CGMs:  Patent with diagnosed: Diabetes Mellitus that required frequent glucose monitoring (4 + times a day and 4x/day testing), frequent adjustments to insulin regiment based on BG or CGM results. Patent requires a therapeutic CGM and is willing to use therapeutic CGM/SMBG for Necessary frequent adjustments in insulin therapy, patient demonstrated an understanding of the technology (can use and recognize alerts and alarms). I, the physician, have assessed adherence to CGM regimen and to the plan, patient will have close follow up in clinic as indicated, every 3 months to 6 months.     Patient experiences (including but not limited to):  [x]   Discrepancies between records and A1C, at risk severe hypoglycemia episode and hospitalization  [x]   Recurrent, unexplained, severe hypoglycemic episodes  [x]   Postprandial hyperglycemia  [x]   Unexplained blood glucose excursions  []   Impaired awareness of hypoglycemia    []   Patient uses insulin pump for diabetes management: will need frequent adjustments to insulin regiment based on BG: including adjustment to  insulin pump setting, sliding scale, correction factor, additional bolusing/correction    I believe that the patient will greatly benefit from wearing CGM because this will allow for better glucose control, help to avoid high/lows and prevent hospitalization.   This also is safer for patient in using CGM during the COVID public health emergency.

## 2023-03-08 ENCOUNTER — TELEPHONE (OUTPATIENT)
Dept: ORTHOPEDICS | Facility: CLINIC | Age: 63
End: 2023-03-08
Payer: MEDICARE

## 2023-03-17 ENCOUNTER — SPECIALTY PHARMACY (OUTPATIENT)
Dept: PHARMACY | Facility: CLINIC | Age: 63
End: 2023-03-17
Payer: MEDICARE

## 2023-03-17 DIAGNOSIS — M05.742 RHEUMATOID ARTHRITIS INVOLVING BOTH HANDS WITH POSITIVE RHEUMATOID FACTOR: Primary | ICD-10-CM

## 2023-03-17 DIAGNOSIS — M05.741 RHEUMATOID ARTHRITIS INVOLVING BOTH HANDS WITH POSITIVE RHEUMATOID FACTOR: Primary | ICD-10-CM

## 2023-03-17 NOTE — TELEPHONE ENCOUNTER
Specialty Pharmacy - Clinical Reassessment  Specialty Pharmacy - Refill Coordination    Specialty Medication Orders Linked to Encounter      Flowsheet Row Most Recent Value   Medication #1 abatacept (ORENCIA CLICKJECT) 125 mg/mL AtIn (Order#[id number], Rx#[id number])        New meds naproxen, methocarbamol, hydrocodone/apap-had a fall-possible fractire in foot. No DDI, appropriate to continue.     Refill Questions - Documented Responses      Flowsheet Row Most Recent Value   Refill Screening Questions    Changes to allergies? No   Changes to medications? Yes  [naproxen, methocarbamol, hydrocodone/apap-had a fall-possible fractire in foot.]   New conditions since last clinic visit? Yes  [naproxen, methocarbamol, hydrocodone/apap-had a fall-possible fractire in foot.]   Unplanned office visit, urgent care, ED, or hospital admission in the last 4 weeks? Yes  [naproxen, methocarbamol, hydrocodone/apap-had a fall-possible fractire in foot.]   How does patient/caregiver feel medication is working? Excellent   Financial problems or insurance changes? No   How many doses of your specialty medications were missed in the last 4 weeks? 0   Would patient like to speak to a pharmacist? No   When does the patient need to receive the medication? 03/24/23   Refill Delivery Questions    How will the patient receive the medication? Pickup   When does the patient need to receive the medication? 03/24/23   Expected Copay ($) 0   Is the patient able to afford the medication copay? Yes   Payment Method zero copay   Days supply of Refill 28   Supplies needed? No supplies needed   Refill activity completed? Yes   Refill activity plan Refill scheduled   Shipment/Pickup Date: 03/22/23            Current Outpatient Medications   Medication Sig    abatacept (ORENCIA CLICKJECT) 125 mg/mL AtIn Inject 125 mg into the skin every 7 days.    albuterol (PROVENTIL) 2.5 mg /3 mL (0.083 %) nebulizer solution     amLODIPine (NORVASC) 10 MG tablet      "ascorbic acid, vitamin C, (VITAMIN C) 1000 MG tablet Take 1,000 mg by mouth once daily.    aspirin (ECOTRIN) 81 MG EC tablet Take 81 mg by mouth once daily.    BD ULTRA-FINE SHORT PEN NEEDLE 31 gauge x 5/16" Ndle Use 1 needles with each insulin injection, 4 times a day, ICD10: 11.9    betamethasone dipropionate (DIPROLENE) 0.05 % cream     blood sugar diagnostic Strp One test strip use 3 times a day to check blood glucose,  ICD-10: E11.9, compatible with insurance/glucometer    blood-glucose meter kit One glucometer, use to check 3 times a day.   ICD-10: E11.9, Dispense machine covered by insurance    cetirizine (ZYRTEC) 10 MG tablet Take 10 mg by mouth once daily.    clobetasol 0.05% (TEMOVATE) 0.05 % Oint Apply topically 2 (two) times daily. To rash on hands for flares up to 2 weeks    cyanocobalamin, vitamin B-12, (VITAMIN B-12 ORAL) Take by mouth.    DEXCOM G6  Misc 1 , use as directed (Patient not taking: Reported on 3/6/2023)    DEXCOM G6 SENSOR Nika 1 sensor, change every 10 days (Patient not taking: Reported on 3/6/2023)    DEXCOM G6 TRANSMITTER Nika 1 Transmitter, use as directed (Patient not taking: Reported on 3/6/2023)    diclofenac sodium (VOLTAREN) 1 % Gel Apply 2 g topically 4 (four) times daily.    diphenhydrAMINE (BENADRYL) 25 mg capsule Take 1 capsule (25 mg total) by mouth nightly as needed (As needed for tinnitus).    DULoxetine (CYMBALTA) 30 MG capsule two capsules daily    ELDERBERRY FRUIT ORAL Take by mouth.    enalapril (VASOTEC) 20 MG tablet     fluticasone propionate (FLONASE) 50 mcg/actuation nasal spray     gabapentin (NEURONTIN) 100 MG capsule One to two capsules daily    HUMALOG KWIKPEN INSULIN 100 unit/mL pen Give 18 units before each meals, three times a day. Give 90 day supply    hydroCHLOROthiazide (HYDRODIURIL) 25 MG tablet     ketoconazole (NIZORAL) 2 % cream APPLY CREAM TOPICALLY TO AFFECTED AREA TWICE DAILY FOR 14 DAYS    lancing device Misc One device, used to " "check blood glucose, ICD-10: E11.9    LANTUS SOLOSTAR U-100 INSULIN glargine 100 units/mL SubQ pen Inject 45 Units into the skin every evening. Give 90 day supply    loratadine (CLARITIN) 10 mg tablet Take 1 tablet (10 mg total) by mouth once daily.    metFORMIN (GLUCOPHAGE) 1000 MG tablet Take 1 tablet (1,000 mg total) by mouth 2 (two) times daily with meals.    nebulizer and compressor Nika USE TID UTD    omeprazole (PRILOSEC) 40 MG capsule     PROVENTIL HFA 90 mcg/actuation inhaler     semaglutide (OZEMPIC) 1 mg/dose (4 mg/3 mL) Inject 1 mg into the skin every 7 days.    sodium chloride (OCEAN NASAL) 0.65 % nasal spray 1 spray by Nasal route every 3 (three) hours as needed for Congestion.    triamcinolone acetonide 0.1% (KENALOG) 0.1 % cream Apply topically.    TRUEPLUS LANCETS 33 gauge Misc Apply topically 2 (two) times daily.   Last reviewed on 3/17/2023  9:32 AM by Mi Guerra PharmD    Review of patient's allergies indicates:   Allergen Reactions    Codeine Other (See Comments)     "it makes me feel crazy"    Prednisone Palpitations     "it makes my heart beat fast. I can take the shot"    Last reviewed on  3/17/2023 9:41 AM by Mi Guerra      Tasks added this encounter   12/17/2023 - Clinical - Follow Up Assesement (Annual)  4/14/2023 - Refill Call (Auto Added)   Tasks due within next 3 months   No tasks due.     Mi Guerra, PharmD  [facility]  [address] [place] [id number]  Phone: [phone]  Fax: [phone]        "

## 2023-03-17 NOTE — TELEPHONE ENCOUNTER
Specialty Pharmacy - Clinical Reassessment    Specialty Medication Orders Linked to Encounter      Flowsheet Row Most Recent Value   Medication #1 abatacept (ORENCIA CLICKJECT) 125 mg/mL AtIn (Order#[id number], Rx#[id number])          Patient Diagnosis   M05.741, M05.742 - Rheumatoid arthritis involving both hands with positive rheumatoid factor    Marisol Rosales is a 62 y.o. female, who is followed by the specialty pharmacy service for management and education of her RA.  She has been on therapy with Orencia since 2/17/21.  I have reviewed her electronic medical record and current medication list and determined that specialty medication adjustment Is not needed at this time.    Patient has not experienced adverse events.  She Is adherent reporting 0 missed doses since last review.  Adherence has been encouraged with the following mechanism(s): Proactive refill calls.  She is meeting goals of therapy and will continue treatment.        2/17/2023 1/25/2023 12/27/2022 12/7/2022 11/11/2022 10/14/2022 9/19/2022   Follow Up Review   # of missed doses 0 0 0 0 0 0    0 0   New Medications? No No No No No Yes    Yes No   New Conditions? No No No No No Yes    No No   New Allergies? No No No No No No    No No   Med Effective? Very good Good Good Good Good Good    Good Very good   Urgent Care? Yes No No No No Yes    No No   Requested Pharmacist? No No No No No Yes    Yes No       Multiple values from one day are sorted in reverse-chronological order         Therapy is appropriate to continue.    Therapy is effective: Yes  On scale of 1 to 10, how does patient rank quality of life? (10 - Best): 10  Recommendations:  Pt was seen in the ER for a fall. Pt has an ortho appt on 3/29 for a possible fracture. Advised pt if she has to surgery she may need to hold off on her dose.   Review Method: Patient Contact    Tasks added this encounter   No tasks added.   Tasks due within next 3 months   3/14/2023 - Clinical - Follow Up Assesement  (Annual)  3/17/2023 - Refill Call (Auto Added)     Mi Guerra, PharmD  [facility]  [address]  [facility] [address]  Phone: [phone]  Fax: [phone]

## 2023-04-11 ENCOUNTER — PATIENT MESSAGE (OUTPATIENT)
Dept: RESEARCH | Facility: HOSPITAL | Age: 63
End: 2023-04-11
Payer: MEDICARE

## 2023-04-14 ENCOUNTER — SPECIALTY PHARMACY (OUTPATIENT)
Dept: PHARMACY | Facility: CLINIC | Age: 63
End: 2023-04-14
Payer: MEDICARE

## 2023-04-14 RX ORDER — ABATACEPT 125 MG/ML
125 INJECTION, SOLUTION SUBCUTANEOUS
Qty: 4 ML | Refills: 1 | OUTPATIENT
Start: 2023-04-14 | End: 2023-05-14

## 2023-04-14 NOTE — TELEPHONE ENCOUNTER
Outgoing call: Patient is due to inject next week, I informed her that a refill request was sent to her doctor and once approved [facility] will follow up.

## 2023-04-18 NOTE — TELEPHONE ENCOUNTER
Outgoing call informing pt that refills for orencia were denied; advised her to give office a call, as there is no reason for denial given

## 2023-04-19 ENCOUNTER — HOSPITAL ENCOUNTER (OUTPATIENT)
Dept: RADIOLOGY | Facility: HOSPITAL | Age: 63
Discharge: HOME | End: 2023-04-19
Attending: INTERNAL MEDICINE
Payer: MEDICARE

## 2023-04-19 ENCOUNTER — OFFICE VISIT (OUTPATIENT)
Dept: RHEUMATOLOGY | Facility: CLINIC | Age: 63
End: 2023-04-19
Payer: MEDICARE

## 2023-04-19 VITALS
WEIGHT: 183 LBS | HEIGHT: 64 IN | DIASTOLIC BLOOD PRESSURE: 81 MMHG | SYSTOLIC BLOOD PRESSURE: 157 MMHG | HEART RATE: 91 BPM | BODY MASS INDEX: 31.24 KG/M2

## 2023-04-19 DIAGNOSIS — M79.631 RIGHT FOREARM PAIN: ICD-10-CM

## 2023-04-19 DIAGNOSIS — M05.741 RHEUMATOID ARTHRITIS INVOLVING BOTH HANDS WITH POSITIVE RHEUMATOID FACTOR: Primary | ICD-10-CM

## 2023-04-19 DIAGNOSIS — M05.742 RHEUMATOID ARTHRITIS INVOLVING BOTH HANDS WITH POSITIVE RHEUMATOID FACTOR: Primary | ICD-10-CM

## 2023-04-19 DIAGNOSIS — M65.4 DE QUERVAIN'S TENOSYNOVITIS, RIGHT: ICD-10-CM

## 2023-04-19 DIAGNOSIS — M05.742 RHEUMATOID ARTHRITIS INVOLVING BOTH HANDS WITH POSITIVE RHEUMATOID FACTOR: ICD-10-CM

## 2023-04-19 DIAGNOSIS — M05.741 RHEUMATOID ARTHRITIS INVOLVING BOTH HANDS WITH POSITIVE RHEUMATOID FACTOR: ICD-10-CM

## 2023-04-19 PROCEDURE — 20600 PR DRAIN/INJECT SMALL JOINT/BURSA: ICD-10-PCS | Mod: RT,S$GLB,, | Performed by: INTERNAL MEDICINE

## 2023-04-19 PROCEDURE — 20600 DRAIN/INJ JOINT/BURSA W/O US: CPT | Mod: RT,S$GLB,, | Performed by: INTERNAL MEDICINE

## 2023-04-19 PROCEDURE — 73090 X-RAY EXAM OF FOREARM: CPT | Mod: 26,RT,, | Performed by: RADIOLOGY

## 2023-04-19 PROCEDURE — 99999 PR PBB SHADOW E&M-EST. PATIENT-LVL IV: CPT | Mod: PBBFAC,,, | Performed by: INTERNAL MEDICINE

## 2023-04-19 PROCEDURE — 73090 X-RAY EXAM OF FOREARM: CPT | Mod: TC,RT

## 2023-04-19 PROCEDURE — 3077F SYST BP >= 140 MM HG: CPT | Mod: CPTII,S$GLB,, | Performed by: INTERNAL MEDICINE

## 2023-04-19 PROCEDURE — 99214 OFFICE O/P EST MOD 30 MIN: CPT | Mod: 25,S$GLB,, | Performed by: INTERNAL MEDICINE

## 2023-04-19 PROCEDURE — 73130 XR HAND COMPLETE 3 VIEW RIGHT: ICD-10-PCS | Mod: 26,RT,, | Performed by: RADIOLOGY

## 2023-04-19 PROCEDURE — 99999 PR PBB SHADOW E&M-EST. PATIENT-LVL IV: ICD-10-PCS | Mod: PBBFAC,,, | Performed by: INTERNAL MEDICINE

## 2023-04-19 PROCEDURE — 73130 X-RAY EXAM OF HAND: CPT | Mod: 26,RT,, | Performed by: RADIOLOGY

## 2023-04-19 PROCEDURE — 3077F PR MOST RECENT SYSTOLIC BLOOD PRESSURE >= 140 MM HG: ICD-10-PCS | Mod: CPTII,S$GLB,, | Performed by: INTERNAL MEDICINE

## 2023-04-19 PROCEDURE — 73130 X-RAY EXAM OF HAND: CPT | Mod: TC,RT

## 2023-04-19 PROCEDURE — 4010F PR ACE/ARB THEARPY RXD/TAKEN: ICD-10-PCS | Mod: CPTII,S$GLB,, | Performed by: INTERNAL MEDICINE

## 2023-04-19 PROCEDURE — 1159F MED LIST DOCD IN RCRD: CPT | Mod: CPTII,S$GLB,, | Performed by: INTERNAL MEDICINE

## 2023-04-19 PROCEDURE — 4010F ACE/ARB THERAPY RXD/TAKEN: CPT | Mod: CPTII,S$GLB,, | Performed by: INTERNAL MEDICINE

## 2023-04-19 PROCEDURE — 1160F PR REVIEW ALL MEDS BY PRESCRIBER/CLIN PHARMACIST DOCUMENTED: ICD-10-PCS | Mod: CPTII,S$GLB,, | Performed by: INTERNAL MEDICINE

## 2023-04-19 PROCEDURE — 3079F DIAST BP 80-89 MM HG: CPT | Mod: CPTII,S$GLB,, | Performed by: INTERNAL MEDICINE

## 2023-04-19 PROCEDURE — 3008F BODY MASS INDEX DOCD: CPT | Mod: CPTII,S$GLB,, | Performed by: INTERNAL MEDICINE

## 2023-04-19 PROCEDURE — 3079F PR MOST RECENT DIASTOLIC BLOOD PRESSURE 80-89 MM HG: ICD-10-PCS | Mod: CPTII,S$GLB,, | Performed by: INTERNAL MEDICINE

## 2023-04-19 PROCEDURE — 73090 XR FOREARM RIGHT: ICD-10-PCS | Mod: 26,RT,, | Performed by: RADIOLOGY

## 2023-04-19 PROCEDURE — 99214 PR OFFICE/OUTPT VISIT, EST, LEVL IV, 30-39 MIN: ICD-10-PCS | Mod: 25,S$GLB,, | Performed by: INTERNAL MEDICINE

## 2023-04-19 PROCEDURE — 1159F PR MEDICATION LIST DOCUMENTED IN MEDICAL RECORD: ICD-10-PCS | Mod: CPTII,S$GLB,, | Performed by: INTERNAL MEDICINE

## 2023-04-19 PROCEDURE — 3008F PR BODY MASS INDEX (BMI) DOCUMENTED: ICD-10-PCS | Mod: CPTII,S$GLB,, | Performed by: INTERNAL MEDICINE

## 2023-04-19 PROCEDURE — 1160F RVW MEDS BY RX/DR IN RCRD: CPT | Mod: CPTII,S$GLB,, | Performed by: INTERNAL MEDICINE

## 2023-04-19 RX ORDER — LIDOCAINE HYDROCHLORIDE 20 MG/ML
0.2 INJECTION, SOLUTION INFILTRATION; PERINEURAL
Status: COMPLETED | OUTPATIENT
Start: 2023-04-19 | End: 2023-04-19

## 2023-04-19 RX ORDER — ABATACEPT 125 MG/ML
125 INJECTION, SOLUTION SUBCUTANEOUS
Qty: 4 ML | Refills: 11 | Status: SHIPPED | OUTPATIENT
Start: 2023-04-19 | End: 2023-12-28 | Stop reason: SDUPTHER

## 2023-04-19 RX ORDER — METHYLPREDNISOLONE ACETATE 40 MG/ML
20 INJECTION, SUSPENSION INTRA-ARTICULAR; INTRALESIONAL; INTRAMUSCULAR; SOFT TISSUE
Status: COMPLETED | OUTPATIENT
Start: 2023-04-19 | End: 2023-04-19

## 2023-04-19 RX ORDER — METHYLPREDNISOLONE 4 MG/1
TABLET ORAL
Qty: 1 EACH | Refills: 1 | Status: SHIPPED | OUTPATIENT
Start: 2023-04-19 | End: 2024-01-29

## 2023-04-19 RX ORDER — GABAPENTIN 100 MG/1
CAPSULE ORAL
Qty: 60 CAPSULE | Refills: 5 | Status: SHIPPED | OUTPATIENT
Start: 2023-04-19 | End: 2024-01-29

## 2023-04-19 RX ORDER — DULOXETIN HYDROCHLORIDE 30 MG/1
CAPSULE, DELAYED RELEASE ORAL
Qty: 60 CAPSULE | Refills: 5 | Status: SHIPPED | OUTPATIENT
Start: 2023-04-19 | End: 2023-12-29

## 2023-04-19 RX ADMIN — METHYLPREDNISOLONE ACETATE 20 MG: 40 INJECTION, SUSPENSION INTRA-ARTICULAR; INTRALESIONAL; INTRAMUSCULAR; SOFT TISSUE at 08:04

## 2023-04-19 RX ADMIN — LIDOCAINE HYDROCHLORIDE 0.2 ML: 20 INJECTION, SOLUTION INFILTRATION; PERINEURAL at 08:04

## 2023-04-19 NOTE — PROGRESS NOTES
Chief Complaint   Patient presents with    Disease Management       Patient with rheumatoid arthritis for a follow up    History of presenting illness    62 year old black female comes in with a new diagnosis of rheumatoid arthritis    She has joint pains for 2 years    Feet hurt  Hands hurt  Neck hurts    Elbows,shoulders,wrists can hurt some  Back can hurt some    Pain,swelling,stiffness+  EMS+    Right 2nd finger is stiff and doesn't bend    No skin rashes,malar rash,photosensitivity  No telangiectasias  No calcinosis     No patchy alopecia  No oral and nasal ulcers  No sicca symptoms   No pleurisy or any cardiopulmonary complaints  No dysphagia,diplopia and dysphonia and muscle weakness  No n/v/d/c  No acid reflux+  No raynaud's+  No digital ulcers     No cytopenias  No renal issues  No blood clots     No fever,chills,night sweats,weight loss and loss of appetite     No pregnancy losses    A PCP diagnosed her with rheumatoid arthritis  Sent her to rheumatology on napolean : got mtx : 4 pills weekly/folic acid  She has headaches and she cant tolerate it   She had a rash    We did the whole panel    CBC nml except for low MCV  High glucose 293  ESR,CRP nml    CCP neg  Pre dmard panel neg  Uric acid nml  ULISES,SSA neg  HLA B27 neg    Arthritis survey    Mild deg changes in the neck  Deg changes in the hands and feet and knees  CXR nml    Humira offered   She stopped it due to fear of side effects    She started taking turmeric    Then we gave ssz and plaquenil since she didn't want to try biologics     She didn't like it as well    She had ongoing pain and swelling in the hands and feet    She was in a study for eczema difelikefalin so we couldn't start the enbrel    She got covid in march  She had mild symptoms   Not hospitalised   Recovered     She then was on enbrel weekly     2/2021  Right shoulder > left shoulder hurts  Hands hurt  Arms hurt    This visit she had disease activity  So we gave  orencia    5/2021    She thinks orencia is working   Only complaint today  Right shoulder pain,we had injected this shoulder last time,the injection helped,but only lasted for a week or so,but yet to complete physical therapy  She thinks PT helps to loosen up but still hurts  Recent bronchitis,stopped orencia 2 weeks ago,will resume it today    Labs     5/2021    Hepatitis and tb neg    CRP nml  ESR nml  Nml Hb,white count and plts  Low MCV 76   CMP nml    Left hand xray  DIP and PIP joint spaces are preserved.  There is some subtle 2nd MCP joint space narrowing and marginal osteophytosis.  The remainder of the MCP joint spaces are preserved.  There are prominent subchondral cystic changes within the proximal carpal row without significant joint space narrowing.  No bone erosion or destruction.     Impression:     Proximal arthropathy with prominent subchondral cysts affecting the radiocarpal joint and MCP joints may reflect CPPD arthropathy although other etiologies are possible.       Left knee xray  Mild femorotibial DJD and joint space narrowing.  No significant subchondral sclerosis.  Tiny patellar marginal osteophytes.  No acute fracture, dislocation, or osseous destruction.  Enthesopathic change of the patella and tibia.  No suprapatellar effusion.  Scattered vascular calcification.      Right shoulder MRI      There is a high-grade mostly articular surface but also bursal surface tear of the supraspinatus tendon with severe tendinosis.  I suspect underlying calcific tendinosis, to correlate with plain film radiographs.     There is tendinosis of the infraspinatus tendon without a definite tear.     The teres minor muscle and tendon appear intact.     There is tendinosis and partial tear of the superior fibers of the subscapularis tendon with partial biceps tendon medial subluxation.     There is abnormal signal of the biceps tendon consistent with significant tendinosis in its intra-articular portion with  tenosynovitis.     The humeral head contour and cartilage appears intact.     The glenoid contour and cartilage appears intact.     The labrum is intact.     There is significant signal abnormality of the subacromial subdeltoid bursa with postcontrast enhancement suggestive of bursitis.     Moderate AC joint osseous hypertrophy.     Impression:     Subacromial subdeltoid bursitis.     Near complete tear of the supraspinatus tendon.     Significant tendinosis of the infraspinatus.     Partial tear of the superior fibers of the subscapularis tendon with medial subluxation of the biceps tendon.     Significant biceps tendinosis and tenosynovitis.    Shoulder xray-right    No fracture.  No malalignment.  Minimal spurring inferiorly about preserved glenohumeral articulation.  Preserved acromioclavicular articulation.  Mild degenerative irregularity about the tuberosity region.  No definite findings of calcific tendinitis.    10/2021    Left knee gives out on her  She is currently enrolled in a therapy program    Right shoulder continues to hurt  Physical therapy helps  In the past steroid injection didn't last long    Right hip -today woke up with pain    On orencia  On cymbalta 30 to 60 mg daily     11/2021    H/h 11.3/36.5  MCV 76  White count nml  plts nml  CMP nml  ESR,CRP nml    3/2022    Right outside of the hip feels sore  Arm muscle pain     On orencia  On cymbalta 30 to 60 mg daily     One night she woke up with an episode of thigh muscle pain which was intense and was associated with vomiting  It lasted for few hours  She stretched and walked and it resolved with time     Left knee doing ok  Right shoulder better     Ck,aldolase nml  ESR,CRP nml  GFR,LFTs nml  Glucose 300's  hba1c 11.6  H/h 11.6/37.8  nml white and plt count    8/2022    On orencia weekly    On cymbalta 30 to 60 mg daily  On gabapentin 100 mg x 2 daily    Off statins-muscle pain got better   She has not started anything else to replace     One  episode of lkou-lbcjyerjsa-bty fell forward on the right knee  She tried to catch herself and she has shoulder pain and arm pain,low back  Prior to the fall she was doing well  No fractures  No LOC    Low MCV  Mild anemia    On ozempic  Lost 20 pound weight loss      4/2023        On orencia weekly    On cymbalta 30 to 60 mg daily  On gabapentin 100 mg x 2 daily    She did well  But  :   Unfortunately had a major fall  She had a refracture of the left ankle  Now the right wrist and arm- hurting and swollen  Right shoulder hurting    No other joints hurt    On ozempic       Past history : dm,htn    Family history : mom has arthritis     Social history : former smoker,quit 20 years ago        Review of Systems   Constitutional:  Negative for activity change, appetite change, chills, diaphoresis, fatigue, fever and unexpected weight change.   HENT:  Negative for congestion, dental problem, drooling, ear discharge, ear pain, facial swelling, hearing loss, mouth sores, nosebleeds, postnasal drip, rhinorrhea, sinus pressure, sinus pain, sneezing, sore throat, tinnitus, trouble swallowing and voice change.    Eyes:  Negative for photophobia, pain, discharge, redness, itching and visual disturbance.   Respiratory:  Negative for apnea, cough, choking, chest tightness, shortness of breath, wheezing and stridor.    Cardiovascular:  Negative for chest pain, palpitations and leg swelling.   Gastrointestinal:  Negative for abdominal distention, abdominal pain, anal bleeding, blood in stool, constipation, diarrhea, nausea, rectal pain and vomiting.   Endocrine: Negative for cold intolerance, heat intolerance, polydipsia, polyphagia and polyuria.   Genitourinary:  Negative for decreased urine volume, difficulty urinating, dysuria, enuresis, flank pain, frequency, genital sores, hematuria and urgency.   Musculoskeletal:  Positive for arthralgias. Negative for back pain, gait problem, joint swelling, myalgias, neck pain and neck  stiffness.   Skin:  Negative for color change, pallor, rash and wound.   Allergic/Immunologic: Negative for environmental allergies, food allergies and immunocompromised state.   Neurological:  Negative for dizziness, tremors, seizures, syncope, facial asymmetry, speech difficulty, weakness, light-headedness, numbness and headaches.   Hematological:  Negative for adenopathy. Does not bruise/bleed easily.   Psychiatric/Behavioral:  Negative for agitation, behavioral problems, confusion, decreased concentration, dysphoric mood, hallucinations, self-injury, sleep disturbance and suicidal ideas. The patient is not nervous/anxious and is not hyperactive.    Physical Exam  HOMUNCULUS UPDATED     Widespread pain index  Note the areas which the patient has had pain over the last week:                   Shoulder-girdle, left               Shoulder-girdle, right                         Upper arm left                       Upper arm right                         Lower arm left                       Lower arm right    Hip (buttock, trochanter) left  Hip (buttock, trochanter) right                           Upper leg, left                         Upper leg, right                           Lower leg, left                         Lower leg, right                                     Jaw, left                                   Jaw, right                                        Chest                                  Abdomen                               Upper back                              Lower back                                        Neck  Score will be from 0-19: 12/19                                         Symptom severity score  Fatigue 2  Waking Unrefreshed 2  Cognitive Symptoms 0   0 = no problem, 1=slight or mild problem 2= moderate; considerable problems often present and/or at a moderate level, 3 = severe, pervasive, continuous, life disturbing problem  For each of the 3 symptoms, indicate the level of severity over  the past week using the Scale.  The symptom severity score is the sum of the severity of the 3 symptoms (fatigue, waking unrefreshed, and cognitive symptoms) plus the number of the following symptoms occurring during the previous 6 months:   Headaches 0  Pain or cramps in the lower abdomen 1  Depression 0  The final score is between 0 and 12 : 5/12                                        Criteria  Patient has fibromyalgia if the following 3 conditions are met:  1.  Widespread pain index greater than or equal to 7 and symptom severity score greater than or equal to 5 or widespread pain index between 3- 6, and symptom severity score greater than or equal to 9.    2.  Symptoms have been present in a similar level for at least 3 months  3.  The patient does not have a disorder that would otherwise sufficiently explain the pain        Physical Exam   Constitutional: She is oriented to person, place, and time. No distress.   HENT:   Head: Normocephalic.   Mouth/Throat: Oropharynx is clear and moist.   Eyes: Pupils are equal, round, and reactive to light. Conjunctivae are normal. Right eye exhibits no discharge. Left eye exhibits no discharge. No scleral icterus.   Neck: No thyromegaly present.   Cardiovascular: Normal rate, regular rhythm and normal heart sounds.   Pulmonary/Chest: Effort normal and breath sounds normal. No stridor.   Abdominal: Soft. Bowel sounds are normal.   Musculoskeletal:         General: Normal range of motion.      Cervical back: Normal range of motion.   Lymphadenopathy:     She has no cervical adenopathy.   Neurological: She is alert and oriented to person, place, and time.   Skin: Skin is warm. No rash noted. She is not diaphoretic.   Psychiatric: Affect and judgment normal.     Assessment     62 year old black female with DM,HTN comes in with a new diagnosis of rheumatoid arthritis    She has joint pains for 2 years    Feet hurt  Hands hurt  Neck hurts    Elbows,shoulders,wrists can hurt  some  Back can hurt some    Pain,swelling,stiffness+  EMS+    Right 2nd finger is stiff and doesn't bend    A PCP diagnosed her with rheumatoid arthritis  Sent her to rheumatology on napolean : got mtx : 4 pills weekly/folic acid  She has headaches and she cant tolerate it and has a rash with it     RF positive  CCP neg    She was on humira and she had some headaches   She saw ad on the tv and got scared of the side effects  She then tried ssz and plaquenil and that didn't work    Finally she agreed to start enbrel  She went on a drug trial for eczema and we couldn't give enbrel   She started enbrel later -didn't like the side effects    She also has   Right hip bursitis  Right shoulder tendinitis  Left knee OA  CPPD  RA      On orencia    On cymbalta 30 to 60 mg daily -for fibromyalgia  gabapentin      8/2022    On orencia weekly    On cymbalta 30 to 60 mg daily  On gabapentin 100 mg x 2 daily    Off statins-muscle pain got better   She has not started anything else to replace     One episode of sbgt-wlixfopfkk-lbx fell forward on the right knee  She tried to catch herself and she has shoulder pain and arm pain,low back  Prior to the fall she was doing well  No fractures  No LOC    Low MCV  Mild anemia    On ozempic  Lost 20 pound weight loss  /76-only in the office visits she says      4/2023        On orencia weekly    On cymbalta 30 to 60 mg daily  On gabapentin 100 mg x 2 daily    She did well  But  :   Unfortunately had a major fall-this time it was not because her knees gave out   She had a refracture of the left ankle  Now the right wrist and arm- hurting and swollen  Right shoulder hurting- rotator cuff surgery pending     No other joints hurt    On ozempic       She has right sided dequervains tenosynovitis        1. Rheumatoid arthritis involving both hands with positive rheumatoid factor    2. Right forearm pain    3. De Quervain's tenosynovitis, right              F/u problem     Plan      Orencia  to continue -weekly   Labs today      Inject the first compartment tendons    Verbal consent taken  First compartment tendons idenitified along the right compartment   Site cleaned  Local anesthestic sprayed  0.2 ml lidocaine and 20 mg methylprednisolone injected  She had no complications    Also send her for right hand and foerarm xray    Then medrol dose pack if injection doesn't work and if the xray has no fracture      Right shoulder RC tendinopathy-needing surgery    Knee PT    Falls    We have suggested :  Knee brace -to see if that gives her some support  Also she has some leg length discrepancy-hence suggested heel lift    Pain management :  Cymbalta 60 mg is ok  Gabapentin 100 bid  voltaren gel topical      Tylenol 650 mg prn use     Eczema -better     Vaccines : flu,pneumonia and shingles,covid UTD     Offered acqua therapy-no appointments yet  Suggested knee therapy    Talk to PCP about replacing atorvastatin with a different drug since sugars are very high  Diabetes control    Anemia -iron studies and tsh    dexa osteopenia  Faizan 1200 mg and vit d 1000 IU suggested    Marisol was seen today for disease management.    Diagnoses and all orders for this visit:    Rheumatoid arthritis involving both hands with positive rheumatoid factor  -     CBC Auto Differential; Future  -     Comprehensive Metabolic Panel; Future  -     C-Reactive Protein; Future  -     Sedimentation rate; Future  -     Quantiferon Gold TB; Future  -     Hepatitis B Surface Antigen; Future  -     Hepatitis B Surface Ab, Qualitative; Future  -     Hepatitis B Core Antibody, Total; Future  -     X-Ray Forearm Right; Future    Right forearm pain  -     X-Ray Hand Complete Right; Future  -     X-Ray Forearm Right; Future    De Quervain's tenosynovitis, right  -     LIDOcaine HCL 20 mg/ml (2%) injection 0.2 mL  -     methylPREDNISolone acetate injection 20 mg    Other orders  -     abatacept (ORENCIA CLICKJECT) 125 mg/mL AtIn; Inject 125 mg into  the skin every 7 days.  -     DULoxetine (CYMBALTA) 30 MG capsule; two capsules daily  -     gabapentin (NEURONTIN) 100 MG capsule; One to two capsules daily  -     methylPREDNISolone (MEDROL DOSEPACK) 4 mg tablet; use as directed          rtc in 3 months

## 2023-04-21 NOTE — TELEPHONE ENCOUNTER
New order received for Orencia. No PA required. Already on file until 12/31/23.     Test claim pays $0.00    Ok to proceed with refill.     Outgoing call to pt to schedule refill. No answer, LVM.

## 2023-04-24 NOTE — TELEPHONE ENCOUNTER
"Specialty Pharmacy - Refill Coordination    Specialty Medication Orders Linked to Encounter      Flowsheet Row Most Recent Value   Medication #1 abatacept (ORENCIA CLICKJECT) 125 mg/mL AtIn (Order#[id number], Rx#[id number])            Refill Questions - Documented Responses      Flowsheet Row Most Recent Value   Patient Availability and HIPAA Verification    Does patient want to proceed with activity? Yes   HIPAA/medical authority confirmed? Yes   Relationship to patient of person spoken to? Self   Refill Screening Questions    Changes to allergies? No   Changes to medications? No   New conditions since last clinic visit? No   Unplanned office visit, urgent care, ED, or hospital admission in the last 4 weeks? No   How does patient/caregiver feel medication is working? Good   Financial problems or insurance changes? No   How many doses of your specialty medications were missed in the last 4 weeks? 0   Would patient like to speak to a pharmacist? No   When does the patient need to receive the medication? 04/28/23   Refill Delivery Questions    How will the patient receive the medication? Pickup   When does the patient need to receive the medication? 04/28/23   Expected Copay ($) 0   Is the patient able to afford the medication copay? Yes   Payment Method zero copay   Days supply of Refill 28   Supplies needed? No supplies needed   Refill activity completed? Yes   Refill activity plan Refill scheduled   Shipment/Pickup Date: 04/24/23            Current Outpatient Medications   Medication Sig    abatacept (ORENCIA CLICKJECT) 125 mg/mL AtIn Inject 125 mg into the skin every 7 days.    albuterol (PROVENTIL) 2.5 mg /3 mL (0.083 %) nebulizer solution     amLODIPine (NORVASC) 10 MG tablet     ascorbic acid, vitamin C, (VITAMIN C) 1000 MG tablet Take 1,000 mg by mouth once daily.    aspirin (ECOTRIN) 81 MG EC tablet Take 81 mg by mouth once daily.    BD ULTRA-FINE SHORT PEN NEEDLE 31 gauge x 5/16" Ndle Use 1 needles with each " insulin injection, 4 times a day, ICD10: 11.9    betamethasone dipropionate (DIPROLENE) 0.05 % cream     blood sugar diagnostic Strp One test strip use 3 times a day to check blood glucose,  ICD-10: E11.9, compatible with insurance/glucometer    blood-glucose meter kit One glucometer, use to check 3 times a day.   ICD-10: E11.9, Dispense machine covered by insurance    cetirizine (ZYRTEC) 10 MG tablet Take 10 mg by mouth once daily.    clobetasol 0.05% (TEMOVATE) 0.05 % Oint Apply topically 2 (two) times daily. To rash on hands for flares up to 2 weeks    cyanocobalamin, vitamin B-12, (VITAMIN B-12 ORAL) Take by mouth.    DEXCOM G6  Misc 1 , use as directed    DEXCOM G6 SENSOR Nika 1 sensor, change every 10 days    DEXCOM G6 TRANSMITTER Nika 1 Transmitter, use as directed    diclofenac sodium (VOLTAREN) 1 % Gel Apply 2 g topically 4 (four) times daily.    diphenhydrAMINE (BENADRYL) 25 mg capsule Take 1 capsule (25 mg total) by mouth nightly as needed (As needed for tinnitus).    DULoxetine (CYMBALTA) 30 MG capsule two capsules daily    ELDERBERRY FRUIT ORAL Take by mouth.    enalapril (VASOTEC) 20 MG tablet     fluticasone propionate (FLONASE) 50 mcg/actuation nasal spray     gabapentin (NEURONTIN) 100 MG capsule One to two capsules daily    HUMALOG KWIKPEN INSULIN 100 unit/mL pen Give 18 units before each meals, three times a day. Give 90 day supply    hydroCHLOROthiazide (HYDRODIURIL) 25 MG tablet     ketoconazole (NIZORAL) 2 % cream APPLY CREAM TOPICALLY TO AFFECTED AREA TWICE DAILY FOR 14 DAYS    lancing device Misc One device, used to check blood glucose, ICD-10: E11.9    LANTUS SOLOSTAR U-100 INSULIN glargine 100 units/mL SubQ pen Inject 45 Units into the skin every evening. Give 90 day supply    loratadine (CLARITIN) 10 mg tablet Take 1 tablet (10 mg total) by mouth once daily.    metFORMIN (GLUCOPHAGE) 1000 MG tablet Take 1 tablet (1,000 mg total) by mouth 2 (two) times daily with meals.     "methylPREDNISolone (MEDROL DOSEPACK) 4 mg tablet use as directed    nebulizer and compressor Nika USE TID UTD    omeprazole (PRILOSEC) 40 MG capsule     PROVENTIL HFA 90 mcg/actuation inhaler     semaglutide (OZEMPIC) 1 mg/dose (4 mg/3 mL) Inject 1 mg into the skin every 7 days.    sodium chloride (OCEAN NASAL) 0.65 % nasal spray 1 spray by Nasal route every 3 (three) hours as needed for Congestion.    triamcinolone acetonide 0.1% (KENALOG) 0.1 % cream Apply topically.    TRUEPLUS LANCETS 33 gauge Misc Apply topically 2 (two) times daily.   Last reviewed on 4/19/2023  8:11 AM by Curly Esparza MD    Review of patient's allergies indicates:   Allergen Reactions    Codeine Other (See Comments)     "it makes me feel crazy"    Prednisone Palpitations     "it makes my heart beat fast. I can take the shot"    Last reviewed on  4/19/2023 7:53 AM by Alysia Wilcox      Tasks added this encounter   4/25/2023 - Pickup Reminder   Tasks due within next 3 months   No tasks due.     Mi Guerra, PharmD  [facility]  [address] [place] [address]  Phone: [phone]  Fax: [phone]        "

## 2023-05-15 ENCOUNTER — SPECIALTY PHARMACY (OUTPATIENT)
Dept: PHARMACY | Facility: CLINIC | Age: 63
End: 2023-05-15
Payer: MEDICARE

## 2023-05-15 NOTE — TELEPHONE ENCOUNTER
"Specialty Pharmacy - Refill Coordination    Specialty Medication Orders Linked to Encounter      Flowsheet Row Most Recent Value   Medication #1 abatacept (ORENCIA CLICKJECT) 125 mg/mL AtIn (Order#[id number], Rx#[id number])            Refill Questions - Documented Responses      Flowsheet Row Most Recent Value   Patient Availability and HIPAA Verification    Does patient want to proceed with activity? Yes   HIPAA/medical authority confirmed? Yes   Relationship to patient of person spoken to? Self   Refill Screening Questions    Changes to allergies? No   Changes to medications? No   New conditions since last clinic visit? No   Unplanned office visit, urgent care, ED, or hospital admission in the last 4 weeks? No   How does patient/caregiver feel medication is working? Good   Financial problems or insurance changes? No   How many doses of your specialty medications were missed in the last 4 weeks? 0   Would patient like to speak to a pharmacist? No   When does the patient need to receive the medication? 05/26/23   Refill Delivery Questions    How will the patient receive the medication? Pickup   When does the patient need to receive the medication? 05/26/23   Expected Copay ($) 0   Is the patient able to afford the medication copay? Yes   Payment Method zero copay   Days supply of Refill 28   Supplies needed? No supplies needed   Refill activity completed? Yes   Refill activity plan Refill scheduled   Shipment/Pickup Date: 05/23/23            Current Outpatient Medications   Medication Sig    abatacept (ORENCIA CLICKJECT) 125 mg/mL AtIn Inject 125 mg into the skin every 7 days.    albuterol (PROVENTIL) 2.5 mg /3 mL (0.083 %) nebulizer solution     amLODIPine (NORVASC) 10 MG tablet     ascorbic acid, vitamin C, (VITAMIN C) 1000 MG tablet Take 1,000 mg by mouth once daily.    aspirin (ECOTRIN) 81 MG EC tablet Take 81 mg by mouth once daily.    BD ULTRA-FINE SHORT PEN NEEDLE 31 gauge x 5/16" Ndle Use 1 needles with each " insulin injection, 4 times a day, ICD10: 11.9    betamethasone dipropionate (DIPROLENE) 0.05 % cream     blood sugar diagnostic Strp One test strip use 3 times a day to check blood glucose,  ICD-10: E11.9, compatible with insurance/glucometer    blood-glucose meter kit One glucometer, use to check 3 times a day.   ICD-10: E11.9, Dispense machine covered by insurance    cetirizine (ZYRTEC) 10 MG tablet Take 10 mg by mouth once daily.    clobetasol 0.05% (TEMOVATE) 0.05 % Oint Apply topically 2 (two) times daily. To rash on hands for flares up to 2 weeks    cyanocobalamin, vitamin B-12, (VITAMIN B-12 ORAL) Take by mouth.    DEXCOM G6  Misc 1 , use as directed    DEXCOM G6 SENSOR Nika 1 sensor, change every 10 days    DEXCOM G6 TRANSMITTER Nika 1 Transmitter, use as directed    diclofenac sodium (VOLTAREN) 1 % Gel Apply 2 g topically 4 (four) times daily.    diphenhydrAMINE (BENADRYL) 25 mg capsule Take 1 capsule (25 mg total) by mouth nightly as needed (As needed for tinnitus).    DULoxetine (CYMBALTA) 30 MG capsule two capsules daily    ELDERBERRY FRUIT ORAL Take by mouth.    enalapril (VASOTEC) 20 MG tablet     fluticasone propionate (FLONASE) 50 mcg/actuation nasal spray     gabapentin (NEURONTIN) 100 MG capsule One to two capsules daily    HUMALOG KWIKPEN INSULIN 100 unit/mL pen Give 18 units before each meals, three times a day. Give 90 day supply    hydroCHLOROthiazide (HYDRODIURIL) 25 MG tablet     ketoconazole (NIZORAL) 2 % cream APPLY CREAM TOPICALLY TO AFFECTED AREA TWICE DAILY FOR 14 DAYS    lancing device Misc One device, used to check blood glucose, ICD-10: E11.9    LANTUS SOLOSTAR U-100 INSULIN glargine 100 units/mL SubQ pen Inject 45 Units into the skin every evening. Give 90 day supply    loratadine (CLARITIN) 10 mg tablet Take 1 tablet (10 mg total) by mouth once daily.    metFORMIN (GLUCOPHAGE) 1000 MG tablet Take 1 tablet (1,000 mg total) by mouth 2 (two) times daily with meals.     "methylPREDNISolone (MEDROL DOSEPACK) 4 mg tablet use as directed    nebulizer and compressor Nika USE TID UTD    omeprazole (PRILOSEC) 40 MG capsule     PROVENTIL HFA 90 mcg/actuation inhaler     semaglutide (OZEMPIC) 1 mg/dose (4 mg/3 mL) Inject 1 mg into the skin every 7 days.    sodium chloride (OCEAN NASAL) 0.65 % nasal spray 1 spray by Nasal route every 3 (three) hours as needed for Congestion.    triamcinolone acetonide 0.1% (KENALOG) 0.1 % cream Apply topically.    TRUEPLUS LANCETS 33 gauge Misc Apply topically 2 (two) times daily.   Last reviewed on 4/19/2023  8:11 AM by Curly Esparza MD    Review of patient's allergies indicates:   Allergen Reactions    Codeine Other (See Comments)     "it makes me feel crazy"    Prednisone Palpitations     "it makes my heart beat fast. I can take the shot"    Last reviewed on  4/19/2023 7:53 AM by Alysia Wilcox      Tasks added this encounter   5/24/2023 - Pickup Reminder   Tasks due within next 3 months   No tasks due.     Lashaun Carey, Patient Care Assistant  [facility]  [address]  [facility] [address]  Phone: [phone]  Fax: [phone]        "

## 2023-05-31 ENCOUNTER — TELEPHONE (OUTPATIENT)
Dept: PHARMACY | Facility: CLINIC | Age: 63
End: 2023-05-31
Payer: MEDICARE

## 2023-05-31 NOTE — TELEPHONE ENCOUNTER
Incoming call from pt stating she was on her way to  her med. Wanted to ensure it was ready for . Informed her it was available. Voiced understanding.

## 2023-06-01 ENCOUNTER — LAB VISIT (OUTPATIENT)
Dept: LAB | Facility: HOSPITAL | Age: 63
End: 2023-06-01
Attending: HOSPITALIST
Payer: MEDICARE

## 2023-06-01 DIAGNOSIS — Z79.4 UNCONTROLLED TYPE 2 DIABETES MELLITUS WITH HYPERGLYCEMIA, WITH LONG-TERM CURRENT USE OF INSULIN: ICD-10-CM

## 2023-06-01 DIAGNOSIS — E11.65 UNCONTROLLED TYPE 2 DIABETES MELLITUS WITH HYPERGLYCEMIA, WITH LONG-TERM CURRENT USE OF INSULIN: ICD-10-CM

## 2023-06-01 LAB
ANION GAP SERPL CALC-SCNC: 7 MMOL/L (ref 8–16)
BUN SERPL-MCNC: 16 MG/DL (ref 8–23)
CALCIUM SERPL-MCNC: 9.5 MG/DL (ref 8.7–10.5)
CHLORIDE SERPL-SCNC: 105 MMOL/L (ref 95–110)
CO2 SERPL-SCNC: 28 MMOL/L (ref 23–29)
CREAT SERPL-MCNC: 0.8 MG/DL (ref 0.5–1.4)
EST. GFR  (NO RACE VARIABLE): >60 ML/MIN/1.73 M^2
ESTIMATED AVG GLUCOSE: 212 MG/DL (ref 68–131)
GLUCOSE SERPL-MCNC: 195 MG/DL (ref 70–110)
HBA1C MFR BLD: 9 % (ref 4–5.6)
POTASSIUM SERPL-SCNC: 4.2 MMOL/L (ref 3.5–5.1)
SODIUM SERPL-SCNC: 140 MMOL/L (ref 136–145)

## 2023-06-01 PROCEDURE — 83036 HEMOGLOBIN GLYCOSYLATED A1C: CPT | Performed by: HOSPITALIST

## 2023-06-01 PROCEDURE — 80048 BASIC METABOLIC PNL TOTAL CA: CPT | Performed by: HOSPITALIST

## 2023-06-01 PROCEDURE — 36415 COLL VENOUS BLD VENIPUNCTURE: CPT | Performed by: HOSPITALIST

## 2023-06-08 ENCOUNTER — OFFICE VISIT (OUTPATIENT)
Dept: ENDOCRINOLOGY | Facility: CLINIC | Age: 63
End: 2023-06-08
Payer: MEDICARE

## 2023-06-08 VITALS
BODY MASS INDEX: 30.38 KG/M2 | SYSTOLIC BLOOD PRESSURE: 108 MMHG | DIASTOLIC BLOOD PRESSURE: 76 MMHG | WEIGHT: 177 LBS | TEMPERATURE: 98 F | HEART RATE: 78 BPM

## 2023-06-08 DIAGNOSIS — E66.09 CLASS 1 OBESITY DUE TO EXCESS CALORIES WITH SERIOUS COMORBIDITY AND BODY MASS INDEX (BMI) OF 30.0 TO 30.9 IN ADULT: ICD-10-CM

## 2023-06-08 DIAGNOSIS — E11.65 UNCONTROLLED TYPE 2 DIABETES MELLITUS WITH HYPERGLYCEMIA, WITH LONG-TERM CURRENT USE OF INSULIN: ICD-10-CM

## 2023-06-08 DIAGNOSIS — I10 ESSENTIAL HYPERTENSION: Primary | ICD-10-CM

## 2023-06-08 DIAGNOSIS — Z79.4 UNCONTROLLED TYPE 2 DIABETES MELLITUS WITH HYPERGLYCEMIA, WITH LONG-TERM CURRENT USE OF INSULIN: ICD-10-CM

## 2023-06-08 PROCEDURE — 3074F PR MOST RECENT SYSTOLIC BLOOD PRESSURE < 130 MM HG: ICD-10-PCS | Mod: CPTII,S$GLB,, | Performed by: HOSPITALIST

## 2023-06-08 PROCEDURE — 3074F SYST BP LT 130 MM HG: CPT | Mod: CPTII,S$GLB,, | Performed by: HOSPITALIST

## 2023-06-08 PROCEDURE — 3078F DIAST BP <80 MM HG: CPT | Mod: CPTII,S$GLB,, | Performed by: HOSPITALIST

## 2023-06-08 PROCEDURE — 1160F RVW MEDS BY RX/DR IN RCRD: CPT | Mod: CPTII,S$GLB,, | Performed by: HOSPITALIST

## 2023-06-08 PROCEDURE — 3052F HG A1C>EQUAL 8.0%<EQUAL 9.0%: CPT | Mod: CPTII,S$GLB,, | Performed by: HOSPITALIST

## 2023-06-08 PROCEDURE — 4010F ACE/ARB THERAPY RXD/TAKEN: CPT | Mod: CPTII,S$GLB,, | Performed by: HOSPITALIST

## 2023-06-08 PROCEDURE — 1160F PR REVIEW ALL MEDS BY PRESCRIBER/CLIN PHARMACIST DOCUMENTED: ICD-10-PCS | Mod: CPTII,S$GLB,, | Performed by: HOSPITALIST

## 2023-06-08 PROCEDURE — 3008F BODY MASS INDEX DOCD: CPT | Mod: CPTII,S$GLB,, | Performed by: HOSPITALIST

## 2023-06-08 PROCEDURE — 3078F PR MOST RECENT DIASTOLIC BLOOD PRESSURE < 80 MM HG: ICD-10-PCS | Mod: CPTII,S$GLB,, | Performed by: HOSPITALIST

## 2023-06-08 PROCEDURE — 99999 PR PBB SHADOW E&M-EST. PATIENT-LVL III: ICD-10-PCS | Mod: PBBFAC,,, | Performed by: HOSPITALIST

## 2023-06-08 PROCEDURE — 1159F MED LIST DOCD IN RCRD: CPT | Mod: CPTII,S$GLB,, | Performed by: HOSPITALIST

## 2023-06-08 PROCEDURE — 1159F PR MEDICATION LIST DOCUMENTED IN MEDICAL RECORD: ICD-10-PCS | Mod: CPTII,S$GLB,, | Performed by: HOSPITALIST

## 2023-06-08 PROCEDURE — 4010F PR ACE/ARB THEARPY RXD/TAKEN: ICD-10-PCS | Mod: CPTII,S$GLB,, | Performed by: HOSPITALIST

## 2023-06-08 PROCEDURE — 3008F PR BODY MASS INDEX (BMI) DOCUMENTED: ICD-10-PCS | Mod: CPTII,S$GLB,, | Performed by: HOSPITALIST

## 2023-06-08 PROCEDURE — 99999 PR PBB SHADOW E&M-EST. PATIENT-LVL III: CPT | Mod: PBBFAC,,, | Performed by: HOSPITALIST

## 2023-06-08 PROCEDURE — 3052F PR MOST RECENT HEMOGLOBIN A1C LEVEL 8.0 - < 9.0%: ICD-10-PCS | Mod: CPTII,S$GLB,, | Performed by: HOSPITALIST

## 2023-06-08 PROCEDURE — 99214 OFFICE O/P EST MOD 30 MIN: CPT | Mod: S$GLB,,, | Performed by: HOSPITALIST

## 2023-06-08 PROCEDURE — 99214 PR OFFICE/OUTPT VISIT, EST, LEVL IV, 30-39 MIN: ICD-10-PCS | Mod: S$GLB,,, | Performed by: HOSPITALIST

## 2023-06-08 RX ORDER — BLOOD-GLUCOSE SENSOR
EACH MISCELLANEOUS
Qty: 3 EACH | Refills: 11 | Status: SHIPPED | OUTPATIENT
Start: 2023-06-08 | End: 2024-03-05 | Stop reason: SDUPTHER

## 2023-06-08 RX ORDER — SEMAGLUTIDE 1.34 MG/ML
1 INJECTION, SOLUTION SUBCUTANEOUS
Qty: 3 ML | Refills: 12 | Status: SHIPPED | OUTPATIENT
Start: 2023-06-08 | End: 2023-12-29 | Stop reason: SDUPTHER

## 2023-06-08 RX ORDER — BLOOD-GLUCOSE,RECEIVER,CONT
EACH MISCELLANEOUS
Qty: 1 EACH | Refills: 0 | Status: SHIPPED | OUTPATIENT
Start: 2023-06-08

## 2023-06-08 NOTE — PROGRESS NOTES
Subjective:      Patient ID: Marisol Rosales is a 62 y.o. female presented to [facility] on 6/8/2023.  Chief Complaint:  No chief complaint on file.      History of Present Illness: Marisol Rosales is a 62 y.o. female here for type 2 diabetes  Other significant past medical history: RA (follow with rheum), HTN, Obesity    With regards to Diabetes Mellitus Type 2  - Known diabetic complications: peripheral neuropathy  - Diagnosed w/ DM: >15 years ago, on insulin for >8 years    Interval history:  Here for follow-up type 2 diabetes.  A1c improved to 9.0%   Compliant with MDI insulin.  Patient did throw away her Dexcom G6 transmitter.  Currently not on Dexcom for the last month   Does have new transmitter now  Reports glucose this morning to be 90, denies any hypoglycemia  Current weight: 177, previous 171<168  Inconsistent insulin administration reported.  High carbohydrate diet reported.  Did not bring diet log for review      Current meds:    Lantus 45 units nightly, advised patient to given at 8:00 p.m. nightly  Humalog 18 units pending carbohydrates intake, 3 times a day before meals  Ozempic 1.0mg once a week injection  meformin 1000mg twice a day with food  Previous meds:              Glipizide  Home glucose checks: checks 2x a day, Logs reviewed/Unavailable: oral recall: 200s-300s   Hypoglycemia: denies  Diet/Exercise:               Eating 2x meals per day               Snacking, craving sweets              Drink: water, coke zero  Weight trend: decreasing steadily  Diabetes Education: No  Diabetes Related Hospitalization:  No  Hx of pancreatitis, hx of thyroid cancer: No  Family history of diabetes: Yes, grandmother and brother  Occupation: not working, disabled     Eye exam current (within one year): yes, DR: no  Reports cuts or ulcers on feet: yes 4/4/2022, Denies  Statin: Not taking, ACE/ARB: Taking    Diabetes lab work  Lab Results   Component Value Date    HGBA1C 9.0 (H) 06/01/2023    HGBA1C 9.8  (H) 11/28/2022    HGBA1C 10.5 (H) 08/22/2022    HGBA1C 11.6 (H) 05/16/2022     Lab Results   Component Value Date    CPEPTIDE 4.62 11/28/2022      No results found for: FRUCTOSAMINE  Lab Results   Component Value Date    MICALBCREAT 9.6 08/22/2022     No results found for: [id number]    Diabetes Management Status: Reviewed this office visit  Screening or Prevention Patient's value Goal Complete/Controlled?   Lipid profile : 08/22/2022 Annually Yes     Dilated retinal exam : 03/02/2022 Annually Yes     Foot exam   : 06/27/2022 Annually Yes        Reviewed past surgical, medical, family, social history and updated as appropriate.  Review of Systems: see HPI above  Objective:   /76   Pulse 78   Temp 98 °F (36.7 °C)   Wt 80.3 kg (177 lb)   BMI 30.38 kg/m²     Body mass index is 30.38 kg/m².  Vital signs reviewed    Physical Exam  Vitals and nursing note reviewed.   Constitutional:       Appearance: Normal appearance. She is well-developed. She is not ill-appearing.   Neck:      Thyroid: No thyromegaly.   Pulmonary:      Effort: Pulmonary effort is normal. No respiratory distress.   Musculoskeletal:         General: Normal range of motion.      Cervical back: Normal range of motion.   Neurological:      General: No focal deficit present.      Mental Status: She is alert. Mental status is at baseline.   Psychiatric:         Mood and Affect: Mood normal.         Behavior: Behavior normal.     Lab Reviewed:   Lab Results   Component Value Date    HGBA1C 9.0 (H) 06/01/2023     Lab Results   Component Value Date    CHOL 195 08/22/2022    HDL 57 08/22/2022    LDLCALC 124.2 08/22/2022    TRIG 69 08/22/2022    CHOLHDL 29.2 08/22/2022     Lab Results   Component Value Date     06/01/2023    K 4.2 06/01/2023     06/01/2023    CO2 28 06/01/2023     (H) 06/01/2023    BUN 16 06/01/2023    CREATININE 0.8 06/01/2023    CALCIUM 9.5 06/01/2023    PROT 7.3 08/15/2022    ALBUMIN 3.7 08/15/2022    BILITOT 0.3  08/15/2022    ALKPHOS 116 08/15/2022    AST 15 08/15/2022    ALT 18 08/15/2022    ANIONGAP 7 (L) 06/01/2023    ESTGFRAFRICA >60 05/16/2022    EGFRNONAA >60 05/16/2022    TSH 1.524 08/15/2022    [id number] 31 11/28/2022     Assessment     1. Essential hypertension        2. Uncontrolled type 2 diabetes mellitus with hyperglycemia, with long-term current use of insulin  semaglutide (OZEMPIC) 1 mg/dose (4 mg/3 mL)    Hemoglobin A1C    Basic Metabolic Panel    DEXCOM G7 SENSOR Nika    DEXCOM G7  Misc      3. Class 1 obesity due to excess calories with serious comorbidity and body mass index (BMI) of 30.0 to 30.9 in adult            Plan     Uncontrolled type 2 diabetes mellitus with hyperglycemia, with long-term current use of insulin  - Diabetes is not at goal, but with improvement given given most current A1C , Goal A1C for patient is 7%  - Complicated by hyperglycemia, dietary indiscretion, missed insulin dose  - Rare hypoglycemia noted.  Due to too much insulin administration reported, not seen on Dexcom  - Diabetic supplies/medications reviewed this visit to ensure continue refills and compliance  - Advised patient to get periodic eye and feet exam.     Plan  - currently not using Dexcom due to transmitter issue.  Advised patient to restart soon as possible  - due to the lack of data, we will continue Lantus 45 units nightly, advised patient to given at 8:00 p.m. nightly  - continue Humalog 18 units pending carbohydrates intake, 3 times a day before meals  - continue Ozempic 1.0mg once a week injection  - Continue meformin 1000mg twice a day with food  - Advised pt to check glucoses regularly, asked to filled glucose log and bring back for review at next office visit  - Follow up as scheduled with lab work prior    Class 1 obesity due to excess calories with serious comorbidity and body mass index (BMI) of 30.0 to 30.9 in adult  - Body mass index is 30.38 kg/m².  - dietary discussion as above  - continue  to monitor weight, weight loss noted  - continue Ozempic 1.0 mg once a week    Essential hypertension  - blood pressure review in clinic today  - manage by PCP    Advised patient to follow up with PCP for routine health maintenance care.   RTC in 3-4 months    Daquan Isaac M.D.  Endocrinology  [facility]  6/8/2023      Disclaimer: This note has been generated in part with the use of voice-recognition software. There may be typographical errors that have been missed during proof-reading.  -------------------------------------------------------------------------------------------------  Indications for CGMs:  Patent with diagnosed: Diabetes Mellitus that required frequent glucose monitoring (4 + times a day and 4x/day testing), frequent adjustments to insulin regiment based on BG or CGM results. Patent requires a therapeutic CGM and is willing to use therapeutic CGM/SMBG for Necessary frequent adjustments in insulin therapy, patient demonstrated an understanding of the technology (can use and recognize alerts and alarms). I, the physician, have assessed adherence to CGM regimen and to the plan, patient will have close follow up in clinic as indicated, every 3 months to 6 months.     Patient experiences (including but not limited to):  [x]   Discrepancies between records and A1C, at risk severe hypoglycemia episode and hospitalization  [x]   Recurrent, unexplained, severe hypoglycemic episodes  [x]   Postprandial hyperglycemia  [x]   Unexplained blood glucose excursions  []   Impaired awareness of hypoglycemia    []   Patient uses insulin pump for diabetes management: will need frequent adjustments to insulin regiment based on BG: including adjustment to  insulin pump setting, sliding scale, correction factor, additional bolusing/correction    I believe that the patient will greatly benefit from wearing CGM because this will allow for better glucose control, help to avoid high/lows and  prevent hospitalization.  This also is safer for patient in using CGM during the COVID public health emergency.

## 2023-06-08 NOTE — ASSESSMENT & PLAN NOTE
- Diabetes is not at goal, but with improvement given given most current A1C , Goal A1C for patient is 7%  - Complicated by hyperglycemia, dietary indiscretion, missed insulin dose  - Rare hypoglycemia noted.  Due to too much insulin administration reported, not seen on Dexcom  - Diabetic supplies/medications reviewed this visit to ensure continue refills and compliance  - Advised patient to get periodic eye and feet exam.     Plan  - currently not using Dexcom due to transmitter issue.  Advised patient to restart soon as possible  - due to the lack of data, we will continue Lantus 45 units nightly, advised patient to given at 8:00 p.m. nightly  - continue Humalog 18 units pending carbohydrates intake, 3 times a day before meals  - continue Ozempic 1.0mg once a week injection  - Continue meformin 1000mg twice a day with food  - Advised pt to check glucoses regularly, asked to filled glucose log and bring back for review at next office visit  - Follow up as scheduled with lab work prior

## 2023-06-08 NOTE — ASSESSMENT & PLAN NOTE
- Body mass index is 30.38 kg/m².  - dietary discussion as above  - continue to monitor weight, weight loss noted  - continue Ozempic 1.0 mg once a week

## 2023-06-12 ENCOUNTER — TELEPHONE (OUTPATIENT)
Dept: ENDOCRINOLOGY | Facility: CLINIC | Age: 63
End: 2023-06-12
Payer: MEDICARE

## 2023-06-12 NOTE — TELEPHONE ENCOUNTER
Spoke to Etelvina at , was advised that they would not be able to send the patient a  for G7 at this time since they sent the G6  last year    Spoke to patient, states that she will stay with G6 at this time because her phone is not reliable     advised

## 2023-06-12 NOTE — TELEPHONE ENCOUNTER
----- Message from Sebas Owen sent at 6/9/2023  2:21 PM CDT -----  Type: Patient Call Back    Who called: Etelvina/ brian Johnson    What is the request in detail: CGM order that she received < Asking for a call about it     Can the clinic reply by [facility]? No     Would the patient rather a call back or a response via [facility]? CALL     Best call back number:[phone] (Direct Line to Etelvina)

## 2023-06-21 ENCOUNTER — SPECIALTY PHARMACY (OUTPATIENT)
Dept: PHARMACY | Facility: CLINIC | Age: 63
End: 2023-06-21
Payer: MEDICARE

## 2023-06-21 NOTE — TELEPHONE ENCOUNTER
"Specialty Pharmacy - Refill Coordination    Specialty Medication Orders Linked to Encounter      Flowsheet Row Most Recent Value   Medication #1 abatacept (ORENCIA CLICKJECT) 125 mg/mL AtIn (Order#[id number], Rx#[id number])            Refill Questions - Documented Responses      Flowsheet Row Most Recent Value   Patient Availability and HIPAA Verification    Does patient want to proceed with activity? Yes   HIPAA/medical authority confirmed? Yes   Relationship to patient of person spoken to? Self   Refill Screening Questions    Changes to allergies? No   Changes to medications? No   New conditions since last clinic visit? No   Unplanned office visit, urgent care, ED, or hospital admission in the last 4 weeks? No   How does patient/caregiver feel medication is working? Good   Financial problems or insurance changes? No   How many doses of your specialty medications were missed in the last 4 weeks? 0   Would patient like to speak to a pharmacist? No   When does the patient need to receive the medication? 06/30/23   Refill Delivery Questions    How will the patient receive the medication? Pickup   When does the patient need to receive the medication? 06/30/23   Expected Copay ($) 0   Is the patient able to afford the medication copay? Yes   Payment Method zero copay   Days supply of Refill 28   Supplies needed? No supplies needed   Refill activity completed? Yes   Refill activity plan Refill scheduled   Shipment/Pickup Date: 06/29/23            Current Outpatient Medications   Medication Sig    abatacept (ORENCIA CLICKJECT) 125 mg/mL AtIn Inject 125 mg into the skin every 7 days.    albuterol (PROVENTIL) 2.5 mg /3 mL (0.083 %) nebulizer solution     amLODIPine (NORVASC) 10 MG tablet     ascorbic acid, vitamin C, (VITAMIN C) 1000 MG tablet Take 1,000 mg by mouth once daily.    aspirin (ECOTRIN) 81 MG EC tablet Take 81 mg by mouth once daily.    BD ULTRA-FINE SHORT PEN NEEDLE 31 gauge x 5/16" Ndle Use 1 needles with each " insulin injection, 4 times a day, ICD10: 11.9    betamethasone dipropionate (DIPROLENE) 0.05 % cream     blood sugar diagnostic Strp One test strip use 3 times a day to check blood glucose,  ICD-10: E11.9, compatible with insurance/glucometer    blood-glucose meter kit One glucometer, use to check 3 times a day.   ICD-10: E11.9, Dispense machine covered by insurance    cetirizine (ZYRTEC) 10 MG tablet Take 10 mg by mouth once daily.    clobetasol 0.05% (TEMOVATE) 0.05 % Oint Apply topically 2 (two) times daily. To rash on hands for flares up to 2 weeks    cyanocobalamin, vitamin B-12, (VITAMIN B-12 ORAL) Take by mouth.    DEXCOM G6  Misc 1 , use as directed (Patient not taking: Reported on 6/8/2023)    DEXCOM G6 SENSOR Nika 1 sensor, change every 10 days (Patient not taking: Reported on 6/8/2023)    DEXCOM G6 TRANSMITTER Nika 1 Transmitter, use as directed (Patient not taking: Reported on 6/8/2023)    DEXCOM G7  Misc Use 1  to track blood glucose, ICD10: E11.65    DEXCOM G7 SENSOR Nika Use 1 sensor every 10 days to track blood glucose, ICD10: E11.65, okay with 90 day supply if possible    diclofenac sodium (VOLTAREN) 1 % Gel Apply 2 g topically 4 (four) times daily.    diphenhydrAMINE (BENADRYL) 25 mg capsule Take 1 capsule (25 mg total) by mouth nightly as needed (As needed for tinnitus).    DULoxetine (CYMBALTA) 30 MG capsule two capsules daily    ELDERBERRY FRUIT ORAL Take by mouth.    enalapril (VASOTEC) 20 MG tablet     fluticasone propionate (FLONASE) 50 mcg/actuation nasal spray     gabapentin (NEURONTIN) 100 MG capsule One to two capsules daily    HUMALOG KWIKPEN INSULIN 100 unit/mL pen Give 18 units before each meals, three times a day. Give 90 day supply    hydroCHLOROthiazide (HYDRODIURIL) 25 MG tablet     ketoconazole (NIZORAL) 2 % cream APPLY CREAM TOPICALLY TO AFFECTED AREA TWICE DAILY FOR 14 DAYS    lancing device Misc One device, used to check blood glucose, ICD-10:  "E11.9    LANTUS SOLOSTAR U-100 INSULIN glargine 100 units/mL SubQ pen Inject 45 Units into the skin every evening. Give 90 day supply    loratadine (CLARITIN) 10 mg tablet Take 1 tablet (10 mg total) by mouth once daily.    metFORMIN (GLUCOPHAGE) 1000 MG tablet Take 1 tablet (1,000 mg total) by mouth 2 (two) times daily with meals.    methylPREDNISolone (MEDROL DOSEPACK) 4 mg tablet use as directed    nebulizer and compressor Nika USE TID UTD    omeprazole (PRILOSEC) 40 MG capsule     PROVENTIL HFA 90 mcg/actuation inhaler     semaglutide (OZEMPIC) 1 mg/dose (4 mg/3 mL) Inject 1 mg into the skin every 7 days.    sodium chloride (OCEAN NASAL) 0.65 % nasal spray 1 spray by Nasal route every 3 (three) hours as needed for Congestion.    triamcinolone acetonide 0.1% (KENALOG) 0.1 % cream Apply topically.    TRUEPLUS LANCETS 33 gauge Misc Apply topically 2 (two) times daily.   Last reviewed on 6/8/2023 12:37 PM by Daquan Isaac MD    Review of patient's allergies indicates:   Allergen Reactions    Codeine Other (See Comments)     "it makes me feel crazy"    Prednisone Palpitations     "it makes my heart beat fast. I can take the shot"    Last reviewed on  6/8/2023 12:37 PM by Daquan Isaac      Tasks added this encounter   No tasks added.   Tasks due within next 3 months   6/21/2023 - Refill Coordination Outreach (1 time occurrence)     Nakita Gusman [facility]  [address] [facility] [address]  Phone: [phone]  Fax: [phone]        "

## 2023-07-28 ENCOUNTER — PATIENT MESSAGE (OUTPATIENT)
Dept: PHARMACY | Facility: CLINIC | Age: 63
End: 2023-07-28
Payer: MEDICARE

## 2023-08-03 ENCOUNTER — SPECIALTY PHARMACY (OUTPATIENT)
Dept: PHARMACY | Facility: CLINIC | Age: 63
End: 2023-08-03
Payer: MEDICARE

## 2023-08-03 NOTE — TELEPHONE ENCOUNTER
"Specialty Pharmacy - Refill Coordination    Specialty Medication Orders Linked to Encounter      Flowsheet Row Most Recent Value   Medication #1 abatacept (ORENCIA CLICKJECT) 125 mg/mL AtIn (Order#[id number], Rx#[id number])            Refill Questions - Documented Responses      Flowsheet Row Most Recent Value   Refill Screening Questions    Changes to allergies? No   Changes to medications? No   New conditions since last clinic visit? No   Unplanned office visit, urgent care, ED, or hospital admission in the last 4 weeks? No   How does patient/caregiver feel medication is working? Good   Financial problems or insurance changes? No   How many doses of your specialty medications were missed in the last 4 weeks? 0   Would patient like to speak to a pharmacist? No   When does the patient need to receive the medication? 08/11/23   Refill Delivery Questions    How will the patient receive the medication? Pickup   When does the patient need to receive the medication? 08/11/23   Shipping Address Home   Address in [facility] confirmed and updated if neccessary? Yes   Expected Copay ($) 0   Is the patient able to afford the medication copay? Yes   Payment Method zero copay   Days supply of Refill 28   Supplies needed? No supplies needed   Refill activity completed? Yes   Refill activity plan Refill scheduled   Shipment/Pickup Date: 08/07/23            Current Outpatient Medications   Medication Sig    abatacept (ORENCIA CLICKJECT) 125 mg/mL AtIn Inject 125 mg into the skin every 7 days.    albuterol (PROVENTIL) 2.5 mg /3 mL (0.083 %) nebulizer solution     amLODIPine (NORVASC) 10 MG tablet     ascorbic acid, vitamin C, (VITAMIN C) 1000 MG tablet Take 1,000 mg by mouth once daily.    aspirin (ECOTRIN) 81 MG EC tablet Take 81 mg by mouth once daily.    BD ULTRA-FINE SHORT PEN NEEDLE 31 gauge x 5/16" Ndle Use 1 needles with each insulin injection, 4 times a day, ICD10: 11.9    betamethasone dipropionate (DIPROLENE) 0.05 " % cream     blood sugar diagnostic Strp One test strip use 3 times a day to check blood glucose,  ICD-10: E11.9, compatible with insurance/glucometer    blood-glucose meter kit One glucometer, use to check 3 times a day.   ICD-10: E11.9, Dispense machine covered by insurance    cetirizine (ZYRTEC) 10 MG tablet Take 10 mg by mouth once daily.    clobetasol 0.05% (TEMOVATE) 0.05 % Oint Apply topically 2 (two) times daily. To rash on hands for flares up to 2 weeks    cyanocobalamin, vitamin B-12, (VITAMIN B-12 ORAL) Take by mouth.    DEXCOM G6  Misc 1 , use as directed (Patient not taking: Reported on 6/8/2023)    DEXCOM G6 SENSOR Nika 1 sensor, change every 10 days (Patient not taking: Reported on 6/8/2023)    DEXCOM G6 TRANSMITTER Nika 1 Transmitter, use as directed (Patient not taking: Reported on 6/8/2023)    DEXCOM G7  Misc Use 1  to track blood glucose, ICD10: E11.65    DEXCOM G7 SENSOR Nika Use 1 sensor every 10 days to track blood glucose, ICD10: E11.65, okay with 90 day supply if possible    diclofenac sodium (VOLTAREN) 1 % Gel Apply 2 g topically 4 (four) times daily.    diphenhydrAMINE (BENADRYL) 25 mg capsule Take 1 capsule (25 mg total) by mouth nightly as needed (As needed for tinnitus).    DULoxetine (CYMBALTA) 30 MG capsule two capsules daily    ELDERBERRY FRUIT ORAL Take by mouth.    enalapril (VASOTEC) 20 MG tablet     fluticasone propionate (FLONASE) 50 mcg/actuation nasal spray     gabapentin (NEURONTIN) 100 MG capsule One to two capsules daily    HUMALOG KWIKPEN INSULIN 100 unit/mL pen Give 18 units before each meals, three times a day. Give 90 day supply    hydroCHLOROthiazide (HYDRODIURIL) 25 MG tablet     ketoconazole (NIZORAL) 2 % cream APPLY CREAM TOPICALLY TO AFFECTED AREA TWICE DAILY FOR 14 DAYS    lancing device Misc One device, used to check blood glucose, ICD-10: E11.9    LANTUS SOLOSTAR U-100 INSULIN glargine 100 units/mL SubQ pen Inject 45 Units into the  "skin every evening. Give 90 day supply    loratadine (CLARITIN) 10 mg tablet Take 1 tablet (10 mg total) by mouth once daily.    metFORMIN (GLUCOPHAGE) 1000 MG tablet Take 1 tablet (1,000 mg total) by mouth 2 (two) times daily with meals.    methylPREDNISolone (MEDROL DOSEPACK) 4 mg tablet use as directed    nebulizer and compressor Nika USE TID UTD    omeprazole (PRILOSEC) 40 MG capsule     PROVENTIL HFA 90 mcg/actuation inhaler     semaglutide (OZEMPIC) 1 mg/dose (4 mg/3 mL) Inject 1 mg into the skin every 7 days.    sodium chloride (OCEAN NASAL) 0.65 % nasal spray 1 spray by Nasal route every 3 (three) hours as needed for Congestion.    triamcinolone acetonide 0.1% (KENALOG) 0.1 % cream Apply topically.    TRUEPLUS LANCETS 33 gauge Misc Apply topically 2 (two) times daily.   Last reviewed on 6/8/2023 12:37 PM by Daquan Isaac MD    Review of patient's allergies indicates:   Allergen Reactions    Codeine Other (See Comments)     "it makes me feel crazy"    Prednisone Palpitations     "it makes my heart beat fast. I can take the shot"    Last reviewed on  6/8/2023 12:37 PM by Daquan Isaac      Tasks added this encounter   8/8/2023 - Pickup Reminder   Tasks due within next 3 months   No tasks due.     Mi Miguel, PharmD  [facility]  [address] [place] [address]  Phone: [phone]  Fax: [phone]        "

## 2023-08-21 ENCOUNTER — PATIENT MESSAGE (OUTPATIENT)
Dept: PHARMACY | Facility: CLINIC | Age: 63
End: 2023-08-21
Payer: MEDICARE

## 2023-08-21 NOTE — TELEPHONE ENCOUNTER
Outgoing call to pt for Orencia refill. No answer, no option to LVM. Sent [employer] message.

## 2023-09-13 ENCOUNTER — LAB VISIT (OUTPATIENT)
Dept: LAB | Facility: HOSPITAL | Age: 63
End: 2023-09-13
Attending: HOSPITALIST
Payer: MEDICARE

## 2023-09-13 DIAGNOSIS — E11.65 UNCONTROLLED TYPE 2 DIABETES MELLITUS WITH HYPERGLYCEMIA, WITH LONG-TERM CURRENT USE OF INSULIN: ICD-10-CM

## 2023-09-13 DIAGNOSIS — Z79.4 UNCONTROLLED TYPE 2 DIABETES MELLITUS WITH HYPERGLYCEMIA, WITH LONG-TERM CURRENT USE OF INSULIN: ICD-10-CM

## 2023-09-13 LAB
ANION GAP SERPL CALC-SCNC: 4 MMOL/L (ref 8–16)
BUN SERPL-MCNC: 12 MG/DL (ref 8–23)
CALCIUM SERPL-MCNC: 8.9 MG/DL (ref 8.7–10.5)
CHLORIDE SERPL-SCNC: 108 MMOL/L (ref 95–110)
CO2 SERPL-SCNC: 28 MMOL/L (ref 23–29)
CREAT SERPL-MCNC: 0.8 MG/DL (ref 0.5–1.4)
EST. GFR  (NO RACE VARIABLE): >60 ML/MIN/1.73 M^2
ESTIMATED AVG GLUCOSE: 252 MG/DL (ref 68–131)
GLUCOSE SERPL-MCNC: 181 MG/DL (ref 70–110)
HBA1C MFR BLD: 10.4 % (ref 4–5.6)
POTASSIUM SERPL-SCNC: 4.2 MMOL/L (ref 3.5–5.1)
SODIUM SERPL-SCNC: 140 MMOL/L (ref 136–145)

## 2023-09-13 PROCEDURE — 80048 BASIC METABOLIC PNL TOTAL CA: CPT | Performed by: HOSPITALIST

## 2023-09-13 PROCEDURE — 36415 COLL VENOUS BLD VENIPUNCTURE: CPT | Performed by: HOSPITALIST

## 2023-09-13 PROCEDURE — 83036 HEMOGLOBIN GLYCOSYLATED A1C: CPT | Performed by: HOSPITALIST

## 2023-09-22 ENCOUNTER — TELEPHONE (OUTPATIENT)
Dept: ENDOCRINOLOGY | Facility: CLINIC | Age: 63
End: 2023-09-22
Payer: MEDICARE

## 2023-09-22 NOTE — TELEPHONE ENCOUNTER
----- Message from Negrito Barney sent at 9/20/2023 10:47 AM CDT -----  Type:  Sooner Apoointment Request    Caller is requesting a sooner appointment.  Caller declined first available appointment listed below.  Caller will not accept being placed on the waitlist and is requesting a message be sent to doctor.  Name of Caller:pt  When is the first available appointment?none  Symptoms:3 month follow up   Would the patient rather a call back or a response via [facility]? Call  Best Call Back Number:[phone]  Additional Information:

## 2023-12-28 DIAGNOSIS — M05.742 RHEUMATOID ARTHRITIS INVOLVING BOTH HANDS WITH POSITIVE RHEUMATOID FACTOR: Primary | ICD-10-CM

## 2023-12-28 DIAGNOSIS — M05.741 RHEUMATOID ARTHRITIS INVOLVING BOTH HANDS WITH POSITIVE RHEUMATOID FACTOR: Primary | ICD-10-CM

## 2023-12-28 RX ORDER — ABATACEPT 125 MG/ML
125 INJECTION, SOLUTION SUBCUTANEOUS
Qty: 4 ML | Refills: 11 | OUTPATIENT
Start: 2023-12-28 | End: 2023-12-29

## 2023-12-28 NOTE — TELEPHONE ENCOUNTER
----- Message from Jolie Cardenas sent at 12/28/2023 10:02 AM CST -----  Regarding: pt adivce  Contact: [phone]  Teresa calling from [facility] in regards to needing pt script send over for abatacept (ORENCIA CLICKJECT) 125 mg/mL AtIn. Pls call           [facility] [place] [address] [address]  Phone: [phone]  Fax: [phone]      ..     1. Start a daily fiber supplement such as Citrucel or Metamucil. Start with once a day and slowly increase up to three times a day, if needed, over the next 4-6 weeks    2. If bleeding returns after one month of fiber supplement, I would recommend a colonoscopy

## 2023-12-29 DIAGNOSIS — E11.65 UNCONTROLLED TYPE 2 DIABETES MELLITUS WITH HYPERGLYCEMIA, WITH LONG-TERM CURRENT USE OF INSULIN: ICD-10-CM

## 2023-12-29 DIAGNOSIS — Z79.4 UNCONTROLLED TYPE 2 DIABETES MELLITUS WITH HYPERGLYCEMIA, WITH LONG-TERM CURRENT USE OF INSULIN: ICD-10-CM

## 2023-12-29 DIAGNOSIS — M05.742 RHEUMATOID ARTHRITIS INVOLVING BOTH HANDS WITH POSITIVE RHEUMATOID FACTOR: ICD-10-CM

## 2023-12-29 DIAGNOSIS — M05.741 RHEUMATOID ARTHRITIS INVOLVING BOTH HANDS WITH POSITIVE RHEUMATOID FACTOR: ICD-10-CM

## 2023-12-29 RX ORDER — DULOXETIN HYDROCHLORIDE 30 MG/1
CAPSULE, DELAYED RELEASE ORAL
Qty: 30 CAPSULE | Refills: 3 | OUTPATIENT
Start: 2023-12-29 | End: 2024-01-29

## 2023-12-29 RX ORDER — ABATACEPT 125 MG/ML
125 INJECTION, SOLUTION SUBCUTANEOUS
Qty: 4 ML | Refills: 11 | Status: ACTIVE | OUTPATIENT
Start: 2023-12-29 | End: 2024-01-30

## 2024-01-02 ENCOUNTER — TELEPHONE (OUTPATIENT)
Dept: ENDOCRINOLOGY | Facility: CLINIC | Age: 64
End: 2024-01-02
Payer: MEDICARE

## 2024-01-02 RX ORDER — SEMAGLUTIDE 1.34 MG/ML
1 INJECTION, SOLUTION SUBCUTANEOUS
Qty: 9 ML | Refills: 0 | Status: SHIPPED | OUTPATIENT
Start: 2024-01-02 | End: 2025-01-01

## 2024-01-02 NOTE — TELEPHONE ENCOUNTER
----- Message from Liberty Mars MA sent at 1/2/2024 10:09 AM CST -----  Name of Who is Calling:JERZY MCCOLLUM [[id number]]                What is the request in detail: Pt is requesting a call back to get scheduled for a 3mth f/u sooner than next available. Please assist.                 Can the clinic reply by [facility]: No                [facility] [phone]

## 2024-01-04 ENCOUNTER — TELEPHONE (OUTPATIENT)
Dept: DIABETES | Facility: CLINIC | Age: 64
End: 2024-01-04
Payer: MEDICARE

## 2024-01-04 DIAGNOSIS — Z79.4 UNCONTROLLED TYPE 2 DIABETES MELLITUS WITH HYPERGLYCEMIA, WITH LONG-TERM CURRENT USE OF INSULIN: Primary | ICD-10-CM

## 2024-01-04 DIAGNOSIS — E11.65 UNCONTROLLED TYPE 2 DIABETES MELLITUS WITH HYPERGLYCEMIA, WITH LONG-TERM CURRENT USE OF INSULIN: Primary | ICD-10-CM

## 2024-01-04 NOTE — TELEPHONE ENCOUNTER
Pt has appointment for DM Education  on 1/11/24, but there is no referral.  Can you please place a  for this appointment?  Thank you.

## 2024-01-05 ENCOUNTER — PATIENT OUTREACH (OUTPATIENT)
Dept: DIABETES | Facility: CLINIC | Age: 64
End: 2024-01-05
Payer: MEDICARE

## 2024-01-05 NOTE — PATIENT INSTRUCTIONS
Called pt about appt on 1/11/24.  Pt had previously used Dexcom G6 but has not been using for a number of months due to it being uncomfortable on abdomen.  Now has Dexcom G7 and wants to come from training.  Pt will use  (used with G6) again.  Provided pt with info on location.  Pt knows to bring supplies and charge .  
EOAE (evoked otoacoustic emission)

## 2024-01-18 ENCOUNTER — NUTRITION (OUTPATIENT)
Dept: DIABETES | Facility: CLINIC | Age: 64
End: 2024-01-18
Payer: MEDICARE

## 2024-01-18 DIAGNOSIS — Z79.4 UNCONTROLLED TYPE 2 DIABETES MELLITUS WITH HYPERGLYCEMIA, WITH LONG-TERM CURRENT USE OF INSULIN: ICD-10-CM

## 2024-01-18 DIAGNOSIS — E11.65 UNCONTROLLED TYPE 2 DIABETES MELLITUS WITH HYPERGLYCEMIA, WITH LONG-TERM CURRENT USE OF INSULIN: ICD-10-CM

## 2024-01-18 PROCEDURE — G0108 DIAB MANAGE TRN  PER INDIV: HCPCS | Mod: S$GLB,,, | Performed by: NUTRITIONIST

## 2024-01-18 PROCEDURE — 99999 PR PBB SHADOW E&M-EST. PATIENT-LVL III: CPT | Mod: PBBFAC,,, | Performed by: NUTRITIONIST

## 2024-01-18 NOTE — PROGRESS NOTES
Diabetes Care Specialist Progress Note  Author: Raina Lara RD  Date: 1/18/2024    Referral 1/4/24    Program Intake  Reason for Diabetes Program Visit:: Intervention  Type of Intervention:: Individual  Individual: Device Training  Device Training: Personal CGM (Start Dexcom G7 with )  Current diabetes risk level:: moderate (T2DM Outcomes Risk=1.5)  In the last 12 months, have you:: none  Permission to speak with others about care:: no    Lab Results   Component Value Date    HGBA1C 10.4 (H) 09/13/2023       Clinical  Patient Health Rating  Compared to other people your age, how would you rate your health?: Good    Problem Review  Reviewed Problem List with Patient: yes  Active comorbidities affecting diabetes self-care.: yes  Comorbidities: Hypertension  Reviewed health maintenance: yes    Clinical Assessment  Current Diabetes Treatment: Oral Medication, Injectable, Insulin (Humalog 18 units before meals (eats twice daily; taking 20 units); Lantus 85 unitsHS (taking 80); metformin 1000 mg BID: Ozempic 1 mg on FRI)  Have you ever experienced hypoglycemia (low blood sugar)?: no  Have you ever experienced hyperglycemia (high blood sugar)?: yes  In the last month, how often have you experienced high blood sugar?: more than once a day  Are you able to tell when your blood sugar is high?: No (comment)  Have you ever been hospitalized because your blood sugar was high?: no    Medication Information  How do you obtain your medications?: Patient drives  Do you sometimes have difficulty refilling your medications?: No  Medication adherence impacting ability to self-manage diabetes?: Yes (Pt does miss doses of medicine)    Labs  Do you have regular lab work to monitor your medications?: Yes  Type of Regular Lab Work: A1c, Cholesterol, CBC, Other  Where do you get your labs drawn?: [facility]  Lab Compliance Barriers: No         Additional Social History    Support  Does anyone support you with your diabetes care?:  yes  Who supports you?: self  Who takes you to your medical appointments?: self  Does the current support meet the patient's needs?: Yes  Is Support an area impacting ability to self-manage diabetes?: No    Access to Mass Media & Technology  Does the patient have access to any of the following devices or technologies?: Smart phone  Media or technology needs impacting ability to self-manage diabetes?: No    Cognitive/Behavioral Health  Alert and Oriented: Yes  Difficulty Thinking: No  Requires Prompting: No  Requires assistance for routine expression?: No  Cognitive or behavioral barriers impacting ability to self-manage diabetes?: No    Culture/Pent[employer]alism  Culture or Holiness beliefs that may impact ability to access healthcare: No    Communication  Language preference: English  Hearing Problems: No  Vision Problems: Yes  Vision problem type:: Decreased Vision  Vision Assistance: Glasses  Communication needs impacting ability to self-manage diabetes?: No    Health Literacy  Preferred Learning Method: Face to Face, Demonstration, Reading Materials  How often do you need to have someone help you read instructions, pamphlets, or written material from your doctor or pharmacy?: Never  Health literacy needs impacting ability to self-manage diabetes?: No      Diabetes Self-Management Skills Assessment    Diabetes Disease Process/Treatment Options  Patient/caregiver able to state what happens when someone has diabetes.: yes  Patient/caregiver knows what type of diabetes they have.: yes  Diabetes Type : Type II  Patient/caregiver able to identify at least three signs and symptoms of diabetes.: yes  Identified signs and symptoms:: blurred vision, fatigue, frequent infections, frequent urination, increased thirst  Patient able to identify at least three risk factors for diabetes.: yes  Identified risk factors:: age over 40, being overweight, ethnicity, family history, reduced activity  Diabetes Disease Process/Treatment  Options: Skills Assessment Completed: Yes  Assessment indicates:: Adequate understanding  Area of need?: No    Nutrition/Healthy Eating  Nutrition/Healthy Eating Skills Assessment Completed:: No  Deffered due to:: Time  Area of need?: Yes    Physical Activity/Exercise  Physical Activity/Exercise Skills Assessment Completed: : No  Deffered due to:: Time  Area of need?: Yes    Medications  Patient is able to describe current diabetes management routine.: yes  Diabetes management routine:: injectable medications, insulin, oral medications (Humalog 18 units before meals (eats twice daily; taking 20 units); Lantus 85 unitsHS (taking 80); metformin 1000 mg BID: Ozempic 1 mg on FRI)  Patient is able to identify current diabetes medications, dosages, and appropriate timing of medications.: no  Patient understands the purpose of the medications taken for diabetes.: yes  Patient reports problems or concerns with current medication regimen.: no  Medication Skills Assessment Completed:: Yes  Assessment indicates:: Instruction Needed  Area of need?: Yes    Home Blood Glucose Monitoring  Patient states that blood sugar is checked at home daily.: yes  Monitoring Method:: personal continuous glucose monitor (start/train today)  Personal CGM type::  (Dexcom G7 via )  Home Blood Glucose Monitoring Skills Assessment Completed: : Yes  Assessment indicates:: Instruction Needed  Area of need?: Yes    Acute Complications  Acute Complications Skills Assessment Completed: : No  Deffered due to:: Time  Area of need?: Yes    Chronic Complications  Patient can identify major chronic complications of diabetes.: yes  Stated chronic complications:: heart disease/heart attack, kidney disease, neuropathy/nerve damage, stroke, retinopathy  Patient can identify ways to prevent or delay diabetes complications.: yes  Stated ways to prevent complications:: healthy eating and regular activity, controlling blood sugar  Patient is aware that having  diabetes increases risk of heart disease?: Yes  Patient is aware that heart disease is the leading cause of death and disability in people with diabetes?: Yes  Patient able to state risk factors for heart disease?: Yes  Patient stated risk factors for heart disease:: High blood pressure, High cholesterol, Diet, Limited activity, Medication non-adherance, Having diabetes  Patient is taking statin?: No  Do you want more information on Statins?: No  Chronic Complications Skills Assessment Completed: : Yes  Assessment indicates:: Adequate understanding  Area of need?: No    Psychosocial/Coping  Patient can identify ways of coping with chronic disease.: yes  Patient-stated ways of coping with chronic disease:: support from loved ones  Psychosocial/Coping Skills Assessment Completed: : Yes  Assessment indicates:: Adequate understanding  Area of need?: No      Assessment Summary and Plan    Based on today's diabetes care assessment, the following areas of need were identified:          1/18/2024    12:01 AM   Social   Support No   Access to Mass Media/Tech No   Cognitive/Behavioral Health No   Culture/[place] No   Communication No   Health Literacy No            1/18/2024    12:01 AM   Clinical   Medication Adherence Yes   Lab Compliance No            1/18/2024    12:01 AM   Diabetes Self-Management Skills   Diabetes Disease Process/Treatment Options No   Nutrition/Healthy Eating Yes--to discuss at next appt   Physical Activity/Exercise Yes--discuss at next appt   Medication Yes--see Care Plan   Home Blood Glucose Monitoring Yes--see Care Plan   Acute Complications Yes--review in more detail at next appt; briefly discussed how to treat hypo   Chronic Complications No   Psychosocial/Coping No          Today's interventions were provided through individual discussion, instruction, and written materials were provided.      Patient verbalized understanding of instruction and written materials.  Pt was able to return back  "demonstration of instructions today. Patient understood key points, needs reinforcement and further instruction.     Diabetes Self-Management Care Plan:    Today's Diabetes Self-Management Care Plan was developed with Marisol's input. Marisol has agreed to work toward the following goal(s) to improve his/her overall diabetes control.      Care Plan: Diabetes Management   Updates made since 12/19/2023 12:00 AM        Problem: Blood Glucose Self-Monitoring         Goal: : Patient will use Dexcom G7 CGM to continuously monitor blood glucose levels daily    Start Date: 1/18/2024   Expected End Date: 4/18/2024   Priority: Medium   Barriers: No Barriers Identified   Note:    Pt had used Dexcom G6 in the past but has been without it for some time.  Here today to upgrade to G7 and train for its use.    Task: Patient agrees to use and understands  the importance of changing sensor every 10 days (with 12 hour chris period). Pt will use  to follow steps for reinsertion and activation     Task:   Pt aware that 30 minute warm up period required before CGM will provide BG levels and will check BG as needed manually      DEXCOM G7 CGM TRAINING  Dexcom G7 mobile apps downloaded to phone. Education was provided using "Quick Start Guide" and demo device per Dexcom protocol.     Patient will use Dexcom G7  to receive continuous glucose data.        Overview:  5 minute glucose reading updates, trending arrows, BG graph screens, reports screen,  connectivity and functions.   Menus: Trend graph, start sensor, enter BG, events, alerts, settings, replace sensor, stop sensor, and shutdown  Dexcom G7 mobile ghassan/ Settings:                          * Urgent Low: 55 mg/dL                          * Urgent Low Soon: On                          * Low Alert: 100 mg/dL; Snooze 15 mins                          * High Alert: 300 mg/dL; Snooze 2 hours                           * Signal Loss: on                          * " Brief Sensor Issue: off     Reviewed additional setting options.  Patient paired sensor using 4-digit code listed on sensor cap..    Reviewed where to find sensor insertion time and expiration date.   Reviewed appropriate calibration techniques.  Reviewed sensor site selection. Patient selected and prepped site using aseptic technique, inserted sensor, applied overlay tape and started session.   Reviewed sensor removal from site. Discussed times to remove sensor per  guidelines include MRI or diatherapy.   Patient able to demonstrate without difficulty. Encouraged to review manual and/or training videos prior to starting another sensor.   Reviewed problem solving aspects of sensor transmission/variables that can disrupt RF transmission.  range 20 feet, but the first 3 hrs keep within 3 feet of transmitter.  Patient instructed on lag time of interstitial fluid from CBG and was advised to treat hypoglycemia and dose insulin based on SMBG values.  Dexcom technical support contact number given and examples of when to contact them discussed.     SUPPLIES FROM: [employer] [phone]              Problem: Medications         Goal: Patient Agrees to take Diabetes Medication(s) as prescribed.    Start Date: 1/18/2024   Expected End Date: 4/18/2024   Priority: High   Barriers: No Barriers Identified   Note:    Pt admits that she does miss doses of insulin at times.  Did not take any insulin today (9 AM); BG running high; states she only had a PRO drink.  Do not know carb content of PRO drink. Pt feels her high BG readings are due to SF cookies she ate last night.  Pt does not have insulin with her; does not know if she has a correction to add to her base meal time insulin of 18 units.  She will bring this up to her Endocrinologist.  Pt states her PCP had increased Lantus from 45 to 80 units (documentation states 85 units).  Wrote down the most recent med info for pt  Lantus 85 units HS (about 8 PM)  This  increase was from Marcia Sweeney on 12/28/23  Humalog 18 units 5-15 mins before eating--pt states she only takes twice daily as she eats only twice.  Metformin 1000 mg BID  Ozempic 1 mg on FRI  Wrote all meds down for pt.  Advised pt to make sure when she leaves home and travels a distance, she should always have an insulin pen, alcohol wipes, back up glucometer with her AND healthy snacks as well as 4 oz juice or glucose tabs         Task: Reviewed with patient all current diabetes medications and provided basic review of the purpose, dosage, frequency, side effects, and storage of both oral and injectable diabetes medications. Completed 1/18/2024        Task: Instructed patient on how to self-administer insulin Completed 1/18/2024        Task: Discussed guidelines for preventing, detecting and treating hypoglycemia and hyperglycemia and reviewed the importance of meal and medication timing with diabetes mediations for prevention of hypoglycemia and maximum drug benefit. Completed 1/18/2024          Follow Up Plan     Follow up in about 1 week (around 1/25/2024) for pt to come on 1/9/24 for assistance changing G7 sensor for the first time; download  for the first time AND complete assessment including diet and exercise.  Pt had lots of diet questions and could benefit from diet instruction; took photos of food products in office.    Today's care plan and follow up schedule was discussed with patient.  Marisol verbalized understanding of the care plan, goals, and agrees to follow up plan.        The patient was encouraged to communicate with his/her health care provider/physician and care team regarding his/her condition(s) and treatment.  I provided the patient with my contact information today and encouraged to contact me via phone or [facility] Patient Portal as needed.     Length of Visit   Total Time: 70 Minutes

## 2024-01-29 ENCOUNTER — OFFICE VISIT (OUTPATIENT)
Dept: RHEUMATOLOGY | Facility: CLINIC | Age: 64
End: 2024-01-29
Payer: MEDICARE

## 2024-01-29 ENCOUNTER — DOCUMENTATION ONLY (OUTPATIENT)
Dept: RHEUMATOLOGY | Facility: CLINIC | Age: 64
End: 2024-01-29

## 2024-01-29 ENCOUNTER — NUTRITION (OUTPATIENT)
Dept: DIABETES | Facility: CLINIC | Age: 64
End: 2024-01-29
Payer: MEDICARE

## 2024-01-29 ENCOUNTER — LAB VISIT (OUTPATIENT)
Dept: LAB | Facility: HOSPITAL | Age: 64
End: 2024-01-29
Attending: INTERNAL MEDICINE
Payer: MEDICARE

## 2024-01-29 VITALS — BODY MASS INDEX: 29.45 KG/M2 | HEIGHT: 65 IN

## 2024-01-29 VITALS — WEIGHT: 181.19 LBS | BODY MASS INDEX: 30.16 KG/M2

## 2024-01-29 DIAGNOSIS — M05.741 RHEUMATOID ARTHRITIS INVOLVING BOTH HANDS WITH POSITIVE RHEUMATOID FACTOR: ICD-10-CM

## 2024-01-29 DIAGNOSIS — Z79.4 TYPE 2 DIABETES MELLITUS WITH HYPERGLYCEMIA, WITH LONG-TERM CURRENT USE OF INSULIN: ICD-10-CM

## 2024-01-29 DIAGNOSIS — M05.742 RHEUMATOID ARTHRITIS INVOLVING BOTH HANDS WITH POSITIVE RHEUMATOID FACTOR: Primary | ICD-10-CM

## 2024-01-29 DIAGNOSIS — M05.741 RHEUMATOID ARTHRITIS INVOLVING BOTH HANDS WITH POSITIVE RHEUMATOID FACTOR: Primary | ICD-10-CM

## 2024-01-29 DIAGNOSIS — D84.821 DRUG-INDUCED IMMUNODEFICIENCY: ICD-10-CM

## 2024-01-29 DIAGNOSIS — Z79.899 DRUG-INDUCED IMMUNODEFICIENCY: ICD-10-CM

## 2024-01-29 DIAGNOSIS — Z79.4 UNCONTROLLED TYPE 2 DIABETES MELLITUS WITH HYPERGLYCEMIA, WITH LONG-TERM CURRENT USE OF INSULIN: Primary | ICD-10-CM

## 2024-01-29 DIAGNOSIS — E11.65 UNCONTROLLED TYPE 2 DIABETES MELLITUS WITH HYPERGLYCEMIA, WITH LONG-TERM CURRENT USE OF INSULIN: Primary | ICD-10-CM

## 2024-01-29 DIAGNOSIS — E11.65 TYPE 2 DIABETES MELLITUS WITH HYPERGLYCEMIA, WITH LONG-TERM CURRENT USE OF INSULIN: ICD-10-CM

## 2024-01-29 DIAGNOSIS — M05.742 RHEUMATOID ARTHRITIS INVOLVING BOTH HANDS WITH POSITIVE RHEUMATOID FACTOR: ICD-10-CM

## 2024-01-29 LAB
ALBUMIN SERPL BCP-MCNC: 3.8 G/DL (ref 3.5–5.2)
ALP SERPL-CCNC: 113 U/L (ref 55–135)
ALT SERPL W/O P-5'-P-CCNC: 25 U/L (ref 10–44)
ANION GAP SERPL CALC-SCNC: 10 MMOL/L (ref 8–16)
AST SERPL-CCNC: 21 U/L (ref 10–40)
BASOPHILS # BLD AUTO: 0.03 K/UL (ref 0–0.2)
BASOPHILS NFR BLD: 0.6 % (ref 0–1.9)
BILIRUB SERPL-MCNC: 0.3 MG/DL (ref 0.1–1)
BUN SERPL-MCNC: 11 MG/DL (ref 8–23)
CALCIUM SERPL-MCNC: 9.8 MG/DL (ref 8.7–10.5)
CHLORIDE SERPL-SCNC: 107 MMOL/L (ref 95–110)
CO2 SERPL-SCNC: 28 MMOL/L (ref 23–29)
CREAT SERPL-MCNC: 0.7 MG/DL (ref 0.5–1.4)
CRP SERPL-MCNC: 1.2 MG/L (ref 0–8.2)
DIFFERENTIAL METHOD BLD: ABNORMAL
EOSINOPHIL # BLD AUTO: 0.1 K/UL (ref 0–0.5)
EOSINOPHIL NFR BLD: 1.8 % (ref 0–8)
ERYTHROCYTE [DISTWIDTH] IN BLOOD BY AUTOMATED COUNT: 16 % (ref 11.5–14.5)
ERYTHROCYTE [SEDIMENTATION RATE] IN BLOOD BY PHOTOMETRIC METHOD: 6 MM/HR (ref 0–36)
EST. GFR  (NO RACE VARIABLE): >60 ML/MIN/1.73 M^2
GLUCOSE SERPL-MCNC: 41 MG/DL (ref 70–110)
HBV CORE AB SERPL QL IA: NORMAL
HBV SURFACE AB SER-ACNC: <3 MIU/ML
HBV SURFACE AB SER-ACNC: NORMAL M[IU]/ML
HBV SURFACE AG SERPL QL IA: NORMAL
HCT VFR BLD AUTO: 38.9 % (ref 37–48.5)
HGB BLD-MCNC: 12.1 G/DL (ref 12–16)
IMM GRANULOCYTES # BLD AUTO: 0.01 K/UL (ref 0–0.04)
IMM GRANULOCYTES NFR BLD AUTO: 0.2 % (ref 0–0.5)
LYMPHOCYTES # BLD AUTO: 2.2 K/UL (ref 1–4.8)
LYMPHOCYTES NFR BLD: 40.2 % (ref 18–48)
MCH RBC QN AUTO: 24.1 PG (ref 27–31)
MCHC RBC AUTO-ENTMCNC: 31.1 G/DL (ref 32–36)
MCV RBC AUTO: 78 FL (ref 82–98)
MONOCYTES # BLD AUTO: 0.4 K/UL (ref 0.3–1)
MONOCYTES NFR BLD: 7.3 % (ref 4–15)
NEUTROPHILS # BLD AUTO: 2.7 K/UL (ref 1.8–7.7)
NEUTROPHILS NFR BLD: 49.9 % (ref 38–73)
NRBC BLD-RTO: 0 /100 WBC
PLATELET # BLD AUTO: 376 K/UL (ref 150–450)
PMV BLD AUTO: 8.9 FL (ref 9.2–12.9)
POTASSIUM SERPL-SCNC: 3.8 MMOL/L (ref 3.5–5.1)
PROT SERPL-MCNC: 7.7 G/DL (ref 6–8.4)
RBC # BLD AUTO: 5.02 M/UL (ref 4–5.4)
SODIUM SERPL-SCNC: 145 MMOL/L (ref 136–145)
WBC # BLD AUTO: 5.45 K/UL (ref 3.9–12.7)

## 2024-01-29 PROCEDURE — 86140 C-REACTIVE PROTEIN: CPT | Performed by: INTERNAL MEDICINE

## 2024-01-29 PROCEDURE — 1159F MED LIST DOCD IN RCRD: CPT | Mod: CPTII,S$GLB,, | Performed by: INTERNAL MEDICINE

## 2024-01-29 PROCEDURE — 99214 OFFICE O/P EST MOD 30 MIN: CPT | Mod: S$GLB,,, | Performed by: INTERNAL MEDICINE

## 2024-01-29 PROCEDURE — 36415 COLL VENOUS BLD VENIPUNCTURE: CPT | Performed by: INTERNAL MEDICINE

## 2024-01-29 PROCEDURE — 85025 COMPLETE CBC W/AUTO DIFF WBC: CPT | Performed by: INTERNAL MEDICINE

## 2024-01-29 PROCEDURE — 86704 HEP B CORE ANTIBODY TOTAL: CPT | Performed by: INTERNAL MEDICINE

## 2024-01-29 PROCEDURE — 86706 HEP B SURFACE ANTIBODY: CPT | Performed by: INTERNAL MEDICINE

## 2024-01-29 PROCEDURE — 87340 HEPATITIS B SURFACE AG IA: CPT | Performed by: INTERNAL MEDICINE

## 2024-01-29 PROCEDURE — 85652 RBC SED RATE AUTOMATED: CPT | Performed by: INTERNAL MEDICINE

## 2024-01-29 PROCEDURE — G0108 DIAB MANAGE TRN  PER INDIV: HCPCS | Mod: S$GLB,,, | Performed by: NUTRITIONIST

## 2024-01-29 PROCEDURE — 3008F BODY MASS INDEX DOCD: CPT | Mod: CPTII,S$GLB,, | Performed by: INTERNAL MEDICINE

## 2024-01-29 PROCEDURE — 99999 PR PBB SHADOW E&M-EST. PATIENT-LVL III: CPT | Mod: PBBFAC,,, | Performed by: INTERNAL MEDICINE

## 2024-01-29 PROCEDURE — 80053 COMPREHEN METABOLIC PANEL: CPT | Performed by: INTERNAL MEDICINE

## 2024-01-29 PROCEDURE — 99999 PR PBB SHADOW E&M-EST. PATIENT-LVL III: CPT | Mod: PBBFAC,,, | Performed by: NUTRITIONIST

## 2024-01-29 RX ORDER — INSULIN GLARGINE-YFGN 100 [IU]/ML
INJECTION, SOLUTION SUBCUTANEOUS
COMMUNITY
Start: 2023-10-06

## 2024-01-29 RX ORDER — HYDRALAZINE HYDROCHLORIDE 50 MG/1
50 TABLET, FILM COATED ORAL 2 TIMES DAILY
COMMUNITY

## 2024-01-29 RX ORDER — TRIAMCINOLONE ACETONIDE 40 MG/ML
40 INJECTION, SUSPENSION INTRA-ARTICULAR; INTRAMUSCULAR
Status: DISCONTINUED | OUTPATIENT
Start: 2024-01-29 | End: 2024-06-18

## 2024-01-29 RX ORDER — LINACLOTIDE 145 UG/1
145 CAPSULE, GELATIN COATED ORAL
COMMUNITY
Start: 2023-10-06

## 2024-01-29 RX ORDER — PROMETHAZINE HYDROCHLORIDE AND DEXTROMETHORPHAN HYDROBROMIDE 6.25; 15 MG/5ML; MG/5ML
SYRUP ORAL
COMMUNITY
Start: 2023-12-11

## 2024-01-29 RX ORDER — DULOXETIN HYDROCHLORIDE 30 MG/1
CAPSULE, DELAYED RELEASE ORAL
Qty: 60 CAPSULE | Refills: 5 | Status: SHIPPED | OUTPATIENT
Start: 2024-01-29 | End: 2024-04-19

## 2024-01-29 RX ORDER — IBUPROFEN 800 MG/1
800 TABLET ORAL EVERY 8 HOURS
COMMUNITY

## 2024-01-29 RX ORDER — NAPROXEN 500 MG/1
TABLET ORAL
Qty: 60 TABLET | Refills: 0 | Status: SHIPPED | OUTPATIENT
Start: 2024-01-29

## 2024-01-29 RX ORDER — GABAPENTIN 100 MG/1
CAPSULE ORAL
Qty: 60 CAPSULE | Refills: 5 | Status: SHIPPED | OUTPATIENT
Start: 2024-01-29 | End: 2024-04-19

## 2024-01-29 RX ORDER — METHYLPREDNISOLONE 4 MG/1
TABLET ORAL
Qty: 1 EACH | Refills: 0 | Status: SHIPPED | OUTPATIENT
Start: 2024-01-29 | End: 2024-01-29

## 2024-01-29 NOTE — PROGRESS NOTES
Chief Complaint   Patient presents with    Disease Management       Patient with rheumatoid arthritis for a follow up    History of presenting illness    63 year old black female comes in with a new diagnosis of rheumatoid arthritis    She has joint pains for 2 years    Feet hurt  Hands hurt  Neck hurts    Elbows,shoulders,wrists can hurt some  Back can hurt some    Pain,swelling,stiffness+  EMS+    Right 2nd finger is stiff and doesn't bend    No skin rashes,malar rash,photosensitivity  No telangiectasias  No calcinosis     No patchy alopecia  No oral and nasal ulcers  No sicca symptoms   No pleurisy or any cardiopulmonary complaints  No dysphagia,diplopia and dysphonia and muscle weakness  No n/v/d/c  No acid reflux+  No raynaud's+  No digital ulcers     No cytopenias  No renal issues  No blood clots     No fever,chills,night sweats,weight loss and loss of appetite     No pregnancy losses    A PCP diagnosed her with rheumatoid arthritis  Sent her to rheumatology on napolean : got mtx : 4 pills weekly/folic acid  She has headaches and she cant tolerate it   She had a rash    We did the whole panel    CBC nml except for low MCV  High glucose 293  ESR,CRP nml    CCP neg  Pre dmard panel neg  Uric acid nml  ULISES,SSA neg  HLA B27 neg    Arthritis survey    Mild deg changes in the neck  Deg changes in the hands and feet and knees  CXR nml    Humira offered   She stopped it due to fear of side effects    She started taking turmeric    Then we gave ssz and plaquenil since she didn't want to try biologics     She didn't like it as well    She had ongoing pain and swelling in the hands and feet    She was in a study for eczema difelikefalin so we couldn't start the enbrel    She got covid in march  She had mild symptoms   Not hospitalised   Recovered     She then was on enbrel weekly     2/2021    Right shoulder > left shoulder hurts  Hands hurt  Arms hurt    This visit she had disease activity  So we gave  orencia    5/2021    She thinks orencia is working   Only complaint today  Right shoulder pain,we had injected this shoulder last time,the injection helped,but only lasted for a week or so,but yet to complete physical therapy  She thinks PT helps to loosen up but still hurts  Recent bronchitis,stopped orencia 2 weeks ago,will resume it today    Labs     5/2021    Hepatitis and tb neg    CRP nml  ESR nml  Nml Hb,white count and plts  Low MCV 76   CMP nml    Left hand xray  DIP and PIP joint spaces are preserved.  There is some subtle 2nd MCP joint space narrowing and marginal osteophytosis.  The remainder of the MCP joint spaces are preserved.  There are prominent subchondral cystic changes within the proximal carpal row without significant joint space narrowing.  No bone erosion or destruction.     Impression:     Proximal arthropathy with prominent subchondral cysts affecting the radiocarpal joint and MCP joints may reflect CPPD arthropathy although other etiologies are possible.       Left knee xray  Mild femorotibial DJD and joint space narrowing.  No significant subchondral sclerosis.  Tiny patellar marginal osteophytes.  No acute fracture, dislocation, or osseous destruction.  Enthesopathic change of the patella and tibia.  No suprapatellar effusion.  Scattered vascular calcification.      Right shoulder MRI      There is a high-grade mostly articular surface but also bursal surface tear of the supraspinatus tendon with severe tendinosis.  I suspect underlying calcific tendinosis, to correlate with plain film radiographs.     There is tendinosis of the infraspinatus tendon without a definite tear.     The teres minor muscle and tendon appear intact.     There is tendinosis and partial tear of the superior fibers of the subscapularis tendon with partial biceps tendon medial subluxation.     There is abnormal signal of the biceps tendon consistent with significant tendinosis in its intra-articular portion with  tenosynovitis.     The humeral head contour and cartilage appears intact.     The glenoid contour and cartilage appears intact.     The labrum is intact.     There is significant signal abnormality of the subacromial subdeltoid bursa with postcontrast enhancement suggestive of bursitis.     Moderate AC joint osseous hypertrophy.     Impression:     Subacromial subdeltoid bursitis.     Near complete tear of the supraspinatus tendon.     Significant tendinosis of the infraspinatus.     Partial tear of the superior fibers of the subscapularis tendon with medial subluxation of the biceps tendon.     Significant biceps tendinosis and tenosynovitis.    Shoulder xray-right    No fracture.  No malalignment.  Minimal spurring inferiorly about preserved glenohumeral articulation.  Preserved acromioclavicular articulation.  Mild degenerative irregularity about the tuberosity region.  No definite findings of calcific tendinitis.    10/2021    Left knee gives out on her  She is currently enrolled in a therapy program    Right shoulder continues to hurt  Physical therapy helps  In the past steroid injection didn't last long    Right hip -today woke up with pain    On orencia  On cymbalta 30 to 60 mg daily     11/2021    H/h 11.3/36.5  MCV 76  White count nml  plts nml  CMP nml  ESR,CRP nml    3/2022    Right outside of the hip feels sore  Arm muscle pain     On orencia  On cymbalta 30 to 60 mg daily     One night she woke up with an episode of thigh muscle pain which was intense and was associated with vomiting  It lasted for few hours  She stretched and walked and it resolved with time     Left knee doing ok  Right shoulder better     Ck,aldolase nml  ESR,CRP nml  GFR,LFTs nml  Glucose 300's  hba1c 11.6  H/h 11.6/37.8  nml white and plt count    8/2022    On orencia weekly    On cymbalta 30 to 60 mg daily  On gabapentin 100 mg x 2 daily    Off statins-muscle pain got better   She has not started anything else to replace     One  episode of sbhk-nmnkegkzen-cvs fell forward on the right knee  She tried to catch herself and she has shoulder pain and arm pain,low back  Prior to the fall she was doing well  No fractures  No LOC    Low MCV  Mild anemia    On ozempic  Lost 20 pound weight loss      4/2023        On orencia weekly    On cymbalta 30 to 60 mg daily  On gabapentin 100 mg x 2 daily    She did well  But  :   Unfortunately had a major fall  She had a refracture of the left ankle  Now the right wrist and arm- hurting and swollen  Right shoulder hurting    No other joints hurt    On ozempic         1/2024    New swelling in bilateral lower extremities- on HCTZ  Started a couple days ago  No SOB, CP    Still taking Orencia weekly- no issues  Cymbalta 60 mg daily- sometimes takes 2 for bad pain  Gabapentin 100 BID    Tenderness over left ankle fracture site    Pain in left knee, both upper arms  Mild swelling in hands  Morning stiffness lasts about 1 hour    New glucose meter- Dexcom G7  On Ozempic    Went to urgent care 2x in past month and a half for pneumonia  Improved on antibiotics     Feeling like she hasn't gotten all her energy back s/p covid in 2020    Got flu vaccine, no covid booster    Signed up for gym recently    WOULD like naprosyn vs tizanidine      Past history : dm,htn    Family history : mom has arthritis     Social history : former smoker,quit 20 years ago        Review of Systems   Constitutional:  Negative for activity change, appetite change, chills, diaphoresis, fatigue, fever and unexpected weight change.   HENT:  Negative for congestion, dental problem, drooling, ear discharge, ear pain, facial swelling, hearing loss, mouth sores, nosebleeds, postnasal drip, rhinorrhea, sinus pressure, sinus pain, sneezing, sore throat, tinnitus, trouble swallowing and voice change.    Eyes:  Negative for photophobia, pain, discharge, redness, itching and visual disturbance.   Respiratory:  Negative for apnea, cough, choking, chest  tightness, shortness of breath, wheezing and stridor.    Cardiovascular:  Negative for chest pain, palpitations and leg swelling.   Gastrointestinal:  Negative for abdominal distention, abdominal pain, anal bleeding, blood in stool, constipation, diarrhea, nausea, rectal pain and vomiting.   Endocrine: Negative for cold intolerance, heat intolerance, polydipsia, polyphagia and polyuria.   Genitourinary:  Negative for decreased urine volume, difficulty urinating, dysuria, enuresis, flank pain, frequency, genital sores, hematuria and urgency.   Musculoskeletal:  Positive for arthralgias. Negative for back pain, gait problem, joint swelling, myalgias, neck pain and neck stiffness.   Skin:  Negative for color change, pallor, rash and wound.   Allergic/Immunologic: Negative for environmental allergies, food allergies and immunocompromised state.   Neurological:  Negative for dizziness, tremors, seizures, syncope, facial asymmetry, speech difficulty, weakness, light-headedness, numbness and headaches.   Hematological:  Negative for adenopathy. Does not bruise/bleed easily.   Psychiatric/Behavioral:  Negative for agitation, behavioral problems, confusion, decreased concentration, dysphoric mood, hallucinations, self-injury, sleep disturbance and suicidal ideas. The patient is not nervous/anxious and is not hyperactive.      Physical Exam  HOMUNCULUS UPDATED     Widespread pain index  Note the areas which the patient has had pain over the last week:                   Shoulder-girdle, left               Shoulder-girdle, right                         Upper arm left                       Upper arm right                         Lower arm left                       Lower arm right    Hip (buttock, trochanter) left  Hip (buttock, trochanter) right                           Upper leg, left                         Upper leg, right                           Lower leg, left                         Lower leg, right                                      Jaw, left                                   Jaw, right                                        Chest                                  Abdomen                               Upper back                              Lower back                                        Neck  Score will be from 0-19: 12/19                                         Symptom severity score  Fatigue 2  Waking Unrefreshed 2  Cognitive Symptoms 0   0 = no problem, 1=slight or mild problem 2= moderate; considerable problems often present and/or at a moderate level, 3 = severe, pervasive, continuous, life disturbing problem  For each of the 3 symptoms, indicate the level of severity over the past week using the Scale.  The symptom severity score is the sum of the severity of the 3 symptoms (fatigue, waking unrefreshed, and cognitive symptoms) plus the number of the following symptoms occurring during the previous 6 months:   Headaches 0  Pain or cramps in the lower abdomen 1  Depression 0  The final score is between 0 and 12 : 5/12                                        Criteria  Patient has fibromyalgia if the following 3 conditions are met:  1.  Widespread pain index greater than or equal to 7 and symptom severity score greater than or equal to 5 or widespread pain index between 3- 6, and symptom severity score greater than or equal to 9.    2.  Symptoms have been present in a similar level for at least 3 months  3.  The patient does not have a disorder that would otherwise sufficiently explain the pain        Physical Exam   Constitutional: She is oriented to person, place, and time. No distress.   HENT:   Head: Normocephalic.   Mouth/Throat: Oropharynx is clear and moist.   Eyes: Pupils are equal, round, and reactive to light. Conjunctivae are normal. Right eye exhibits no discharge. Left eye exhibits no discharge. No scleral icterus.   Neck: No thyromegaly present.   Cardiovascular: Normal rate, regular rhythm and normal heart  sounds.   Pulmonary/Chest: Effort normal and breath sounds normal. No stridor.   Abdominal: Soft. Bowel sounds are normal.   Musculoskeletal:         General: Normal range of motion.      Cervical back: Normal range of motion.   Lymphadenopathy:     She has no cervical adenopathy.   Neurological: She is alert and oriented to person, place, and time.   Skin: Skin is warm. No rash noted. She is not diaphoretic.   Psychiatric: Affect and judgment normal.       Assessment     62 year old black female with DM,HTN comes in with a new diagnosis of rheumatoid arthritis    She has joint pains for 2 years    Feet hurt  Hands hurt  Neck hurts    Elbows,shoulders,wrists can hurt some  Back can hurt some    Pain,swelling,stiffness+  EMS+    Right 2nd finger is stiff and doesn't bend    A PCP diagnosed her with rheumatoid arthritis  Sent her to rheumatology on napolean : got mtx : 4 pills weekly/folic acid  She has headaches and she cant tolerate it and has a rash with it     RF positive  CCP neg    She was on humira and she had some headaches   She saw ad on the tv and got scared of the side effects  She then tried ssz and plaquenil and that didn't work    Finally she agreed to start enbrel  She went on a drug trial for eczema and we couldn't give enbrel   She started enbrel later -didn't like the side effects    She also has   Right hip bursitis  Right shoulder tendinitis  Left knee OA  CPPD  RA      On orencia    On cymbalta 30 to 60 mg daily -for fibromyalgia  gabapentin      8/2022    On orencia weekly    On cymbalta 30 to 60 mg daily  On gabapentin 100 mg x 2 daily    Off statins-muscle pain got better   She has not started anything else to replace     One episode of pbbm-ipuqibenbb-mgb fell forward on the right knee  She tried to catch herself and she has shoulder pain and arm pain,low back  Prior to the fall she was doing well  No fractures  No LOC    Low MCV  Mild anemia    On ozempic  Lost 20 pound weight loss  BP  149/76-only in the office visits she says      4/2023        On orencia weekly    On cymbalta 30 to 60 mg daily  On gabapentin 100 mg x 2 daily    She did well  But  :   Unfortunately had a major fall-this time it was not because her knees gave out   She had a refracture of the left ankle  Now the right wrist and arm- hurting and swollen  Right shoulder hurting- rotator cuff surgery pending     No other joints hurt    On ozempic       She has right sided dequervains tenosynovitis      1/2024    New swelling in bilateral lower extremities- on HCTZ  Started a couple days ago  No SOB, CP    Still taking Orencia weekly- no issues  Cymbalta 60 mg daily- sometimes takes 2 for bad pain  Gabapentin 100 BID    Tenderness over left ankle to mid foot fracture site    Pain in left knee, both upper arms  Mild swelling in hands  Morning stiffness lasts about 1 hour    New glucose meter- Dexcom G7  On Ozempic    Went to urgent care 2x in past month and a half for pneumonia  Improved on antibiotics     Feeling like she hasn't gotten all her energy back s/p covid in 2020    Got flu vaccine, no covid booster    Signed up for gym recently    WOULD like naprosyn vs tizanidine    Last time we injected her dequervains    CDAI 29    1. Rheumatoid arthritis involving both hands with positive rheumatoid factor    2. Drug-induced immunodeficiency    3. Type 2 diabetes mellitus with hyperglycemia, with long-term current use of insulin                F/u problem     Plan      Labs today    OFFERED steroids  She doesn't want pills  She prefers injections    Kenalog today    Re examine the 3 months  If still active then change orencia    Orencia to continue -weekly   Labs today    As needed naprosyn only     Right shoulder RC tendinopathy-needing surgery    Knee PT    Falls    Leg edema management as per PCP     We have suggested :  Knee brace -to see if that gives her some support  Also she has some leg length discrepancy-hence suggested heel  lift    Pain management :    Cymbalta 60 mg is ok  Gabapentin 100 bid    voltaren gel topical      Tylenol 650 mg prn use     Eczema -better     Vaccines : flu,pneumonia and shingles,covid UTD     Offered acqua therapy-when ready  Suggested knee therapy    Talk to PCP about replacing atorvastatin with a different drug since sugars are very high  Diabetes control    Anemia -iron studies and tsh    dexa osteopenia  Faizan 1200 mg and vit d 1000 IU suggested    Marisol was seen today for disease management.    Diagnoses and all orders for this visit:    Rheumatoid arthritis involving both hands with positive rheumatoid factor  -     CBC Auto Differential; Future  -     Comprehensive Metabolic Panel; Future  -     Sedimentation rate; Future  -     C-Reactive Protein; Future  -     Quantiferon Gold TB; Future  -     Hepatitis B Surface Antigen; Future  -     Hepatitis B Surface Ab, Qualitative; Future  -     Hepatitis B Core Antibody, Total; Future  -     triamcinolone acetonide injection 40 mg    Drug-induced immunodeficiency    Type 2 diabetes mellitus with hyperglycemia, with long-term current use of insulin    Other orders  -     Discontinue: methylPREDNISolone (MEDROL DOSEPACK) 4 mg tablet; use as directed  -     naproxen (EC NAPROSYN) 500 MG EC tablet; 500 mg bid prn  -     DULoxetine (CYMBALTA) 30 MG capsule; One to two tabs daily  -     gabapentin (NEURONTIN) 100 MG capsule; One to two capsules daily            rtc in 3 months

## 2024-01-29 NOTE — PROGRESS NOTES
Called and alerted her,she is feeling ok,she ate after she did this lab,she will go home and check again

## 2024-01-29 NOTE — PROGRESS NOTES
Diabetes Care Specialist Progress Note  Author: Raina Lara RD  Date: 1/29/2024    Referral 1-4-24  Start/train Dexcom G7 w/:  1/18/24    Program Intake  Reason for Diabetes Program Visit:: Intervention  Type of Intervention:: Individual  Individual: Education  Education: Nutrition and Meal Planning, Self-Management Skill Review, Other (download Dexcom G7  first time)  Current diabetes risk level:: moderate (T2DM Outcomes Risk=1.5)  Permission to speak with others about care:: no    Lab Results   Component Value Date    HGBA1C 10.4 (H) 09/13/2023       Clinical    Weight: 82.2 kg (181 lb 3.5 oz)       Body mass index is 30.16 kg/m².    Clinical Assessment  Current Diabetes Treatment: Oral Medication, Injectable, Insulin (Lantus 80 units HS (8 pm); Humalog 18 units before meals; Metformin 1000 mg BID; Ozempic 1 mg on FRI)  Have you ever experienced hypoglycemia (low blood sugar)?: yes  In the last month, how often have you experienced low blood sugar?: once a day  Are you able to tell when your blood sugar is low?: Yes  What symptoms do you experience?: Other, Tiredness (CGM alarm; tired; tingling in hands)  Have you ever been hospitalized because your blood sugar was too low?: no  How do you treat hypoglycemia (low blood sugar)?: 1/2 can soda/fruit juice, 1 tablespoon sugar/honey, 1 tube glucose gel, 4 glucose tablets, 5-6 pieces of hard candy  Have you ever experienced hyperglycemia (high blood sugar)?: yes  In the last month, how often have you experienced high blood sugar?: more than once a day  Are you able to tell when your blood sugar is high?: No (comment)  Have you ever been hospitalized because your blood sugar was high?: no    Medication Information  How do you obtain your medications?: Patient drives  How many days a week do you miss your medications?: 2  Do you sometimes have difficulty refilling your medications?: No  Medication adherence impacting ability to self-manage diabetes?: Yes  (missing doses of Lantus and not taking Humalog correctly)    Labs  Do you have regular lab work to monitor your medications?: Yes  Type of Regular Lab Work: A1c, Cholesterol, CBC, Other  Where do you get your labs drawn?: [facility]  Lab Compliance Barriers: No    Diabetes Self-Management Skills Assessment      Medications  Patient is able to describe current diabetes management routine.: yes  Diabetes management routine:: injectable medications, insulin, oral medications (Lantus 80 units HS (8 pm); Humalog 18 units before meals; Metformin 1000 mg BID; Ozempic 1 mg on FRI)  Patient is able to identify current diabetes medications, dosages, and appropriate timing of medications.: no  Patient understands the purpose of the medications taken for diabetes.: no  Patient reports problems or concerns with current medication regimen.: yes  Medication regimen problems/concerns:: unsure of doses, other (see comments) (concerns about going low)  Medication Skills Assessment Completed:: Yes  Assessment indicates:: Instruction Needed  Area of need?: Yes    Home Blood Glucose Monitoring  Patient states that blood sugar is checked at home daily.: yes  Monitoring Method:: personal continuous glucose monitor  Personal CGM type::  (Dexcom G7 w/)  Patient is able to use personal CGM appropriately.: yes  CGM Report reviewed?: yes  Home Blood Glucose Monitoring Skills Assessment Completed: : Yes  Assessment indicates:: Adequate understanding  Area of need?: No      Dexcom G7 DOWNLOAD: See media file for details. 40% glucoses are usually in target range.    Pt is having global hyperglycemia with episodes of hypo; the latter can be attributed to taking mealtime insulin after or during eating.     Average glucose: 220 mg/dL  Sensor Usage:   86%    39% CGM readings greater than 250 mg/dL  19% CGM readings between 181-250 mg/dL  40% CGM readings in target glucose range of  mg/dL   1% CGM readings between 54-79%  <1% CGM readings  less than 54 mg/dL    Acute Complications  Patient is able to identify types of acute complications: Yes  Patient Identified:: Hypoglycemia  Able to state the blood sugar range for hypoglycemia?: yes  Patient stated range::  (<70)  Patient can identify general symptoms of hypoglycemia: yes  Patient identified:: fatigue, other (see comments) (tingling in hands)  Able to state proper treatment of hypoglycemia?: no (see comments)  Acute Complications Skills Assessment Completed: : Yes  Assessment indicates:: Instruction Needed  Area of need?: Yes    Assessment Summary and Plan    Based on today's diabetes care assessment, the following areas of need were identified:          1/18/2024    12:01 AM   Social   Support No   Access to Mass Media/Tech No   Cognitive/Behavioral Health No   Culture/Holiness No   Communication No   Health Literacy No            1/29/2024    12:04 AM   Clinical   Medication Adherence Yes   Lab Compliance No            1/29/2024    12:04 AM   Diabetes Self-Management Skills   Medication Yes--see Care Plan   Home Blood Glucose Monitoring No   Acute Complications Yes--reviewed correct way to treat hypo; handout provided.  But stressed best way to avoid hypo is to take mealtime insulin correclty          Today's interventions were provided through individual discussion, instruction, and written materials were provided.      Patient verbalized understanding of instruction and written materials.  Pt was able to return back demonstration of instructions today. Patient understood key points, needs reinforcement and further instruction.     Diabetes Self-Management Care Plan:    Today's Diabetes Self-Management Care Plan was developed with Marisol's input. Marisol has agreed to work toward the following goal(s) to improve his/her overall diabetes control.      Care Plan: Diabetes Management   Updates made since 12/30/2023 12:00 AM        Problem: Blood Glucose Self-Monitoring         Goal: Patient will use  Dexcom G7 CGM to continuously monitor blood glucose levels daily    Start Date: 1/18/2024   Expected End Date: 4/18/2024   This Visit's Progress: On track   Priority: Medium   Barriers: No Barriers Identified   Note:    Pt had used Dexcom G6 in the past but has been without it for some time.  Here today to upgrade to G7 and train for its use.    Task: Patient agrees to use and understands  the importance of changing sensor every 10 days (with 12 hour chris period). Pt will use  to follow steps for reinsertion and activation     Task:   Pt aware that 30 minute warm up period required before CGM will provide BG levels and will check BG as needed manually     1-29-24:  Pt was able to change sensor yesterday without any assistance.  Pt did indicate having a small issue but she was able to work it out and get sensor on and working.  Still using reciever         Problem: Medications         Goal: Patient Agrees to take Diabetes Medication(s) as prescribed.    Start Date: 1/18/2024   Expected End Date: 4/18/2024   This Visit's Progress: No change   Priority: High   Barriers: No Barriers Identified   Note:    Pt admits that she does miss doses of insulin at times.  Did not take any insulin today (9 AM); BG running high; states she only had a PRO drink.  Do not know carb content of PRO drink. Pt feels her high BG readings are due to SF cookies she ate last night.  Pt does not have insulin with her; does not know if she has a correction to add to her base meal time insulin of 18 units.  She will bring this up to her Endocrinologist.  Pt states her PCP had increased Lantus from 45 to 80 units (documentation states 85 units).  Wrote down the most recent med info for pt  Lantus 85 units HS (about 8 PM)  This increase was from Marciajoseph Sweeney on 12/28/23  Humalog 18 units 5-15 mins before eating--pt states she only takes twice daily as she eats only twice.  Metformin 1000 mg BID  Ozempic 1 mg on FRI  Wrote all meds down for  pt.  Advised pt to make sure when she leaves home and travels a distance, she should always have an insulin pen, alcohol wipes, back up glucometer with her AND healthy snacks as well as 4 oz juice or glucose tabs      1-29-24:  Pt is still not taking meds accurately--missed doses of Lantus when BG was around 100; doesn't take mealtime Humalog BEFORE eating; may take during or after eating or may not take at all.  Reinforced importance of taking these meds accurately and showed pt on Dexcom report what happens when taking Humalog during or after eating---low BG.  Reviewed proper way to treat hypo; handout provided.   Wrote following out for pt and encouraged pt to keep near meds and eating area:    Lantus--  *ALWAYS take at a 8 pm  Humalog--  *take 5-15 minutes BEFORE eating  (don't take if you don't eat a meal)                                                  -take after taking a shower but before eating meal           Task: Reviewed with patient all current diabetes medications and provided basic review of the purpose, dosage, frequency, side effects, and storage of both oral and injectable diabetes medications. Completed 1/18/2024        Task: Instructed patient on how to self-administer insulin.  Again, explained how long and rapid acting insulin work; how to take correctly.  Printed instructions on 8 1/2 X 11 piece of paper so pt can keep in area where she will see it  Completed 1/18/2024        Task: Discussed guidelines for preventing, detecting and treating hypoglycemia and hyperglycemia and reviewed the importance of meal and medication timing with diabetes mediations for prevention of hypoglycemia and maximum drug benefit. Completed 1/18/2024          Follow Up Plan     Follow up in about 3 weeks (around 2/19/2024) for f/u on 2/21/24 at 9 AM to review meds; look at Dexcom report to see if taking meds accurately; discuss diet.  Encouraged pt to call to reschedule appt with Dr Butt because she missed a  virtual today due to other appts.    Today's care plan and follow up schedule was discussed with patient.  Marisol verbalized understanding of the care plan, goals, and agrees to follow up plan.        The patient was encouraged to communicate with his/her health care provider/physician and care team regarding his/her condition(s) and treatment.  I provided the patient with my contact information today and encouraged to contact me via phone or [facility] Patient Portal as needed.     Length of Visit   Total Time: 45 Minutes

## 2024-02-05 ENCOUNTER — PATIENT OUTREACH (OUTPATIENT)
Dept: DIABETES | Facility: CLINIC | Age: 64
End: 2024-02-05
Payer: MEDICARE

## 2024-02-05 NOTE — PATIENT INSTRUCTIONS
Returned call to patient who has 2 questions/issues:  *Dexcom G7 has not been sticking to her arm well; had 2 sensors fall off.  REC use an adhesive before putting sensor on, I.e. Skin tac.  Pt did call Dexcom for replacements  *Pt contacted [facility] about her supplies and was told that they no longer provide her supplies. Pt does not know what to do.  REC pt contact her insurance company ([employer]) to see who they are approving for Dexcom supplies.  Then contact Christine MUNOZ if a new Rx is needed.   Pt will let Educator know how all of this goes.

## 2024-03-05 DIAGNOSIS — Z79.4 UNCONTROLLED TYPE 2 DIABETES MELLITUS WITH HYPERGLYCEMIA, WITH LONG-TERM CURRENT USE OF INSULIN: ICD-10-CM

## 2024-03-05 DIAGNOSIS — E11.65 UNCONTROLLED TYPE 2 DIABETES MELLITUS WITH HYPERGLYCEMIA, WITH LONG-TERM CURRENT USE OF INSULIN: ICD-10-CM

## 2024-03-05 RX ORDER — BLOOD-GLUCOSE SENSOR
EACH MISCELLANEOUS
Qty: 3 EACH | Refills: 11 | Status: SHIPPED | OUTPATIENT
Start: 2024-03-05 | End: 2024-05-01 | Stop reason: SDUPTHER

## 2024-03-05 RX ORDER — SEMAGLUTIDE 1.34 MG/ML
1 INJECTION, SOLUTION SUBCUTANEOUS
Qty: 9 ML | Refills: 0 | Status: SHIPPED | OUTPATIENT
Start: 2024-03-05 | End: 2024-05-09

## 2024-03-05 NOTE — TELEPHONE ENCOUNTER
----- Message from Nancy Ross sent at 3/5/2024  4:07 PM CST -----  Regarding: Self .[phone]   Type: RX Refill Request    Who Called: Self     Have you contacted your pharmacy:    Refill 90 day     RX Name and Strength: DEXCOM G7 SENSOR Nika, semaglutide (OZEMPIC) 1 mg/dose (4 mg/3 mL)      Preferred Pharmacy with phone number: .    [facility] - [facility] [address]  [address] [address]  Phone: [phone] Fax: [phone]        Local or Mail Order: local     Would the patient rather a call back or a response via [facility]? Call back     Best Call Back Number:.[phone]      Additional Information: Please call pt back with appt options she would like to be seen first appt isnt until May     Thank you.

## 2024-03-05 NOTE — TELEPHONE ENCOUNTER
Informed pt our next available is in may. Advised pt she can check the portal daily for any cancellations. Understanding verbalized.

## 2024-03-05 NOTE — TELEPHONE ENCOUNTER
----- Message from Susie Simpson sent at 3/5/2024  4:28 PM CST -----  Regarding: appt  Type:  Patient Returning Call      Name of who is calling: Marisol        What is request in detail: Patient is requesting a call back to set up appointment earlier than May 2024        Can clinic reply by [facility]:no        What number to call back if not in [facility]:[phone]

## 2024-03-13 ENCOUNTER — PATIENT OUTREACH (OUTPATIENT)
Dept: DIABETES | Facility: CLINIC | Age: 64
End: 2024-03-13
Payer: MEDICARE

## 2024-03-13 NOTE — PATIENT INSTRUCTIONS
Called pt about missed f/u appt for DM Education from 2/21/24.  Pt currently using Dexcom G7 via .  Pt would like to reschedule that appt but is not at home currently to check her schedule.  She will return call to Educator.  Phone number provided.

## 2024-03-18 ENCOUNTER — PATIENT OUTREACH (OUTPATIENT)
Dept: DIABETES | Facility: CLINIC | Age: 64
End: 2024-03-18
Payer: MEDICARE

## 2024-04-01 ENCOUNTER — PATIENT OUTREACH (OUTPATIENT)
Dept: DIABETES | Facility: CLINIC | Age: 64
End: 2024-04-01
Payer: MEDICARE

## 2024-04-01 NOTE — PATIENT INSTRUCTIONS
Pt called requesting F/u appt for DM Education be changed from 4/3/24.  Pt will come on 4/8/24 at 11 AM per request.

## 2024-04-19 ENCOUNTER — OFFICE VISIT (OUTPATIENT)
Dept: RHEUMATOLOGY | Facility: CLINIC | Age: 64
End: 2024-04-19
Payer: MEDICARE

## 2024-04-19 ENCOUNTER — LAB VISIT (OUTPATIENT)
Dept: LAB | Facility: HOSPITAL | Age: 64
End: 2024-04-19
Attending: INTERNAL MEDICINE
Payer: MEDICARE

## 2024-04-19 VITALS
SYSTOLIC BLOOD PRESSURE: 157 MMHG | HEIGHT: 65 IN | WEIGHT: 169.75 LBS | HEART RATE: 74 BPM | DIASTOLIC BLOOD PRESSURE: 70 MMHG | BODY MASS INDEX: 28.28 KG/M2

## 2024-04-19 DIAGNOSIS — M85.89 OSTEOPENIA OF MULTIPLE SITES: ICD-10-CM

## 2024-04-19 DIAGNOSIS — M05.741 RHEUMATOID ARTHRITIS INVOLVING BOTH HANDS WITH POSITIVE RHEUMATOID FACTOR: Primary | ICD-10-CM

## 2024-04-19 DIAGNOSIS — M05.741 RHEUMATOID ARTHRITIS INVOLVING BOTH HANDS WITH POSITIVE RHEUMATOID FACTOR: ICD-10-CM

## 2024-04-19 DIAGNOSIS — M05.742 RHEUMATOID ARTHRITIS INVOLVING BOTH HANDS WITH POSITIVE RHEUMATOID FACTOR: Primary | ICD-10-CM

## 2024-04-19 DIAGNOSIS — Z79.4 UNCONTROLLED TYPE 2 DIABETES MELLITUS WITH HYPERGLYCEMIA, WITH LONG-TERM CURRENT USE OF INSULIN: ICD-10-CM

## 2024-04-19 DIAGNOSIS — M05.742 RHEUMATOID ARTHRITIS INVOLVING BOTH HANDS WITH POSITIVE RHEUMATOID FACTOR: ICD-10-CM

## 2024-04-19 DIAGNOSIS — E11.65 UNCONTROLLED TYPE 2 DIABETES MELLITUS WITH HYPERGLYCEMIA, WITH LONG-TERM CURRENT USE OF INSULIN: ICD-10-CM

## 2024-04-19 LAB
ALBUMIN SERPL BCP-MCNC: 3.6 G/DL (ref 3.5–5.2)
ALP SERPL-CCNC: 128 U/L (ref 55–135)
ALT SERPL W/O P-5'-P-CCNC: 19 U/L (ref 10–44)
ANION GAP SERPL CALC-SCNC: 8 MMOL/L (ref 8–16)
AST SERPL-CCNC: 15 U/L (ref 10–40)
BASOPHILS # BLD AUTO: 0.03 K/UL (ref 0–0.2)
BASOPHILS NFR BLD: 0.5 % (ref 0–1.9)
BILIRUB SERPL-MCNC: 0.5 MG/DL (ref 0.1–1)
BUN SERPL-MCNC: 16 MG/DL (ref 8–23)
CALCIUM SERPL-MCNC: 9.7 MG/DL (ref 8.7–10.5)
CHLORIDE SERPL-SCNC: 103 MMOL/L (ref 95–110)
CO2 SERPL-SCNC: 28 MMOL/L (ref 23–29)
CREAT SERPL-MCNC: 0.8 MG/DL (ref 0.5–1.4)
CRP SERPL-MCNC: 0.9 MG/L (ref 0–8.2)
DIFFERENTIAL METHOD BLD: ABNORMAL
EOSINOPHIL # BLD AUTO: 0.1 K/UL (ref 0–0.5)
EOSINOPHIL NFR BLD: 1.8 % (ref 0–8)
ERYTHROCYTE [DISTWIDTH] IN BLOOD BY AUTOMATED COUNT: 15.1 % (ref 11.5–14.5)
ERYTHROCYTE [SEDIMENTATION RATE] IN BLOOD BY PHOTOMETRIC METHOD: 11 MM/HR (ref 0–36)
EST. GFR  (NO RACE VARIABLE): >60 ML/MIN/1.73 M^2
GLUCOSE SERPL-MCNC: 212 MG/DL (ref 70–110)
HCT VFR BLD AUTO: 40.3 % (ref 37–48.5)
HGB BLD-MCNC: 12.6 G/DL (ref 12–16)
IMM GRANULOCYTES # BLD AUTO: 0.01 K/UL (ref 0–0.04)
IMM GRANULOCYTES NFR BLD AUTO: 0.2 % (ref 0–0.5)
LYMPHOCYTES # BLD AUTO: 2.8 K/UL (ref 1–4.8)
LYMPHOCYTES NFR BLD: 47.1 % (ref 18–48)
MCH RBC QN AUTO: 24.6 PG (ref 27–31)
MCHC RBC AUTO-ENTMCNC: 31.3 G/DL (ref 32–36)
MCV RBC AUTO: 79 FL (ref 82–98)
MONOCYTES # BLD AUTO: 0.4 K/UL (ref 0.3–1)
MONOCYTES NFR BLD: 6.9 % (ref 4–15)
NEUTROPHILS # BLD AUTO: 2.6 K/UL (ref 1.8–7.7)
NEUTROPHILS NFR BLD: 43.5 % (ref 38–73)
NRBC BLD-RTO: 0 /100 WBC
PLATELET # BLD AUTO: 356 K/UL (ref 150–450)
PMV BLD AUTO: 9.3 FL (ref 9.2–12.9)
POTASSIUM SERPL-SCNC: 4.1 MMOL/L (ref 3.5–5.1)
PROT SERPL-MCNC: 7.3 G/DL (ref 6–8.4)
RBC # BLD AUTO: 5.13 M/UL (ref 4–5.4)
SODIUM SERPL-SCNC: 139 MMOL/L (ref 136–145)
WBC # BLD AUTO: 5.97 K/UL (ref 3.9–12.7)

## 2024-04-19 PROCEDURE — 3078F DIAST BP <80 MM HG: CPT | Mod: CPTII,S$GLB,, | Performed by: INTERNAL MEDICINE

## 2024-04-19 PROCEDURE — 3077F SYST BP >= 140 MM HG: CPT | Mod: CPTII,S$GLB,, | Performed by: INTERNAL MEDICINE

## 2024-04-19 PROCEDURE — 86140 C-REACTIVE PROTEIN: CPT | Performed by: INTERNAL MEDICINE

## 2024-04-19 PROCEDURE — 85652 RBC SED RATE AUTOMATED: CPT | Performed by: INTERNAL MEDICINE

## 2024-04-19 PROCEDURE — 4010F ACE/ARB THERAPY RXD/TAKEN: CPT | Mod: CPTII,S$GLB,, | Performed by: INTERNAL MEDICINE

## 2024-04-19 PROCEDURE — 85025 COMPLETE CBC W/AUTO DIFF WBC: CPT | Performed by: INTERNAL MEDICINE

## 2024-04-19 PROCEDURE — 99999 PR PBB SHADOW E&M-EST. PATIENT-LVL V: CPT | Mod: PBBFAC,,, | Performed by: INTERNAL MEDICINE

## 2024-04-19 PROCEDURE — 3008F BODY MASS INDEX DOCD: CPT | Mod: CPTII,S$GLB,, | Performed by: INTERNAL MEDICINE

## 2024-04-19 PROCEDURE — 1160F RVW MEDS BY RX/DR IN RCRD: CPT | Mod: CPTII,S$GLB,, | Performed by: INTERNAL MEDICINE

## 2024-04-19 PROCEDURE — 80053 COMPREHEN METABOLIC PANEL: CPT | Performed by: INTERNAL MEDICINE

## 2024-04-19 PROCEDURE — 1159F MED LIST DOCD IN RCRD: CPT | Mod: CPTII,S$GLB,, | Performed by: INTERNAL MEDICINE

## 2024-04-19 PROCEDURE — 36415 COLL VENOUS BLD VENIPUNCTURE: CPT | Performed by: INTERNAL MEDICINE

## 2024-04-19 PROCEDURE — 99214 OFFICE O/P EST MOD 30 MIN: CPT | Mod: S$GLB,,, | Performed by: INTERNAL MEDICINE

## 2024-04-19 RX ORDER — TOCILIZUMAB 180 MG/ML
162 INJECTION, SOLUTION SUBCUTANEOUS
Qty: 3.6 ML | Refills: 11 | Status: ACTIVE | OUTPATIENT
Start: 2024-04-19 | End: 2024-04-26

## 2024-04-19 RX ORDER — ABATACEPT 125 MG/ML
125 INJECTION, SOLUTION SUBCUTANEOUS
Qty: 4 ML | Refills: 11 | Status: SHIPPED | OUTPATIENT
Start: 2024-04-19 | End: 2024-04-19

## 2024-04-19 RX ORDER — FERROUS SULFATE 325(65) MG
325 TABLET ORAL
Qty: 90 TABLET | Refills: 1 | Status: SHIPPED | OUTPATIENT
Start: 2024-04-19 | End: 2024-10-16

## 2024-04-19 RX ORDER — DULOXETIN HYDROCHLORIDE 30 MG/1
CAPSULE, DELAYED RELEASE ORAL
Qty: 60 CAPSULE | Refills: 5 | Status: SHIPPED | OUTPATIENT
Start: 2024-04-19

## 2024-04-19 RX ORDER — GABAPENTIN 100 MG/1
CAPSULE ORAL
Qty: 60 CAPSULE | Refills: 5 | Status: SHIPPED | OUTPATIENT
Start: 2024-04-19 | End: 2024-04-19

## 2024-04-19 RX ORDER — GABAPENTIN 100 MG/1
CAPSULE ORAL
Qty: 60 CAPSULE | Refills: 5 | Status: SHIPPED | OUTPATIENT
Start: 2024-04-19

## 2024-04-19 RX ORDER — ATORVASTATIN CALCIUM 20 MG/1
20 TABLET, FILM COATED ORAL
COMMUNITY
Start: 2024-04-12

## 2024-04-19 RX ORDER — CYCLOBENZAPRINE HCL 5 MG
5-10 TABLET ORAL NIGHTLY
Qty: 60 TABLET | Refills: 5 | Status: SHIPPED | OUTPATIENT
Start: 2024-04-19 | End: 2024-10-16

## 2024-04-19 RX ORDER — DULOXETIN HYDROCHLORIDE 30 MG/1
CAPSULE, DELAYED RELEASE ORAL
Qty: 60 CAPSULE | Refills: 5 | Status: SHIPPED | OUTPATIENT
Start: 2024-04-19 | End: 2024-04-19

## 2024-04-19 NOTE — PROGRESS NOTES
Chief Complaint   Patient presents with    Disease Management    Pain       Patient with rheumatoid arthritis for a follow up    History of presenting illness    63 year old black female comes in with a new diagnosis of rheumatoid arthritis    She has joint pains for 2 years    Feet hurt  Hands hurt  Neck hurts    Elbows,shoulders,wrists can hurt some  Back can hurt some    Pain,swelling,stiffness+  EMS+    Right 2nd finger is stiff and doesn't bend    No skin rashes,malar rash,photosensitivity  No telangiectasias  No calcinosis     No patchy alopecia  No oral and nasal ulcers  No sicca symptoms   No pleurisy or any cardiopulmonary complaints  No dysphagia,diplopia and dysphonia and muscle weakness  No n/v/d/c  No acid reflux+  No raynaud's+  No digital ulcers     No cytopenias  No renal issues  No blood clots     No fever,chills,night sweats,weight loss and loss of appetite     No pregnancy losses    A PCP diagnosed her with rheumatoid arthritis  Sent her to rheumatology on napolean : got mtx : 4 pills weekly/folic acid  She has headaches and she cant tolerate it   She had a rash    We did the whole panel    CBC nml except for low MCV  High glucose 293  ESR,CRP nml    CCP neg  Pre dmard panel neg  Uric acid nml  ULISES,SSA neg  HLA B27 neg    Arthritis survey    Mild deg changes in the neck  Deg changes in the hands and feet and knees  CXR nml    Humira offered   She stopped it due to fear of side effects    She started taking turmeric    Then we gave ssz and plaquenil since she didn't want to try biologics     She didn't like it as well    She had ongoing pain and swelling in the hands and feet    She was in a study for eczema difelikefalin so we couldn't start the enbrel    She got covid in march  She had mild symptoms   Not hospitalised   Recovered     She then was on enbrel weekly     2/2021    Right shoulder > left shoulder hurts  Hands hurt  Arms hurt    This visit she had disease activity  So we gave  orencia    5/2021    She thinks orencia is working   Only complaint today  Right shoulder pain,we had injected this shoulder last time,the injection helped,but only lasted for a week or so,but yet to complete physical therapy  She thinks PT helps to loosen up but still hurts  Recent bronchitis,stopped orencia 2 weeks ago,will resume it today    Labs     5/2021    Hepatitis and tb neg    CRP nml  ESR nml  Nml Hb,white count and plts  Low MCV 76   CMP nml    Left hand xray  DIP and PIP joint spaces are preserved.  There is some subtle 2nd MCP joint space narrowing and marginal osteophytosis.  The remainder of the MCP joint spaces are preserved.  There are prominent subchondral cystic changes within the proximal carpal row without significant joint space narrowing.  No bone erosion or destruction.     Impression:     Proximal arthropathy with prominent subchondral cysts affecting the radiocarpal joint and MCP joints may reflect CPPD arthropathy although other etiologies are possible.       Left knee xray  Mild femorotibial DJD and joint space narrowing.  No significant subchondral sclerosis.  Tiny patellar marginal osteophytes.  No acute fracture, dislocation, or osseous destruction.  Enthesopathic change of the patella and tibia.  No suprapatellar effusion.  Scattered vascular calcification.      Right shoulder MRI      There is a high-grade mostly articular surface but also bursal surface tear of the supraspinatus tendon with severe tendinosis.  I suspect underlying calcific tendinosis, to correlate with plain film radiographs.     There is tendinosis of the infraspinatus tendon without a definite tear.     The teres minor muscle and tendon appear intact.     There is tendinosis and partial tear of the superior fibers of the subscapularis tendon with partial biceps tendon medial subluxation.     There is abnormal signal of the biceps tendon consistent with significant tendinosis in its intra-articular portion with  tenosynovitis.     The humeral head contour and cartilage appears intact.     The glenoid contour and cartilage appears intact.     The labrum is intact.     There is significant signal abnormality of the subacromial subdeltoid bursa with postcontrast enhancement suggestive of bursitis.     Moderate AC joint osseous hypertrophy.     Impression:     Subacromial subdeltoid bursitis.     Near complete tear of the supraspinatus tendon.     Significant tendinosis of the infraspinatus.     Partial tear of the superior fibers of the subscapularis tendon with medial subluxation of the biceps tendon.     Significant biceps tendinosis and tenosynovitis.    Shoulder xray-right    No fracture.  No malalignment.  Minimal spurring inferiorly about preserved glenohumeral articulation.  Preserved acromioclavicular articulation.  Mild degenerative irregularity about the tuberosity region.  No definite findings of calcific tendinitis.    10/2021    Left knee gives out on her  She is currently enrolled in a therapy program    Right shoulder continues to hurt  Physical therapy helps  In the past steroid injection didn't last long    Right hip -today woke up with pain    On orencia  On cymbalta 30 to 60 mg daily     11/2021    H/h 11.3/36.5  MCV 76  White count nml  plts nml  CMP nml  ESR,CRP nml    3/2022    Right outside of the hip feels sore  Arm muscle pain     On orencia  On cymbalta 30 to 60 mg daily     One night she woke up with an episode of thigh muscle pain which was intense and was associated with vomiting  It lasted for few hours  She stretched and walked and it resolved with time     Left knee doing ok  Right shoulder better     Ck,aldolase nml  ESR,CRP nml  GFR,LFTs nml  Glucose 300's  hba1c 11.6  H/h 11.6/37.8  nml white and plt count    8/2022    On orencia weekly    On cymbalta 30 to 60 mg daily  On gabapentin 100 mg x 2 daily    Off statins-muscle pain got better   She has not started anything else to replace     One  episode of jhwx-kcbopclfky-htu fell forward on the right knee  She tried to catch herself and she has shoulder pain and arm pain,low back  Prior to the fall she was doing well  No fractures  No LOC    Low MCV  Mild anemia    On ozempic  Lost 20 pound weight loss      4/2023        On orencia weekly    On cymbalta 30 to 60 mg daily  On gabapentin 100 mg x 2 daily    She did well  But  :   Unfortunately had a major fall  She had a refracture of the left ankle  Now the right wrist and arm- hurting and swollen  Right shoulder hurting    No other joints hurt    On ozempic         1/2024    New swelling in bilateral lower extremities- on HCTZ  Started a couple days ago  No SOB, CP    Still taking Orencia weekly- no issues  Cymbalta 60 mg daily- sometimes takes 2 for bad pain  Gabapentin 100 BID    Tenderness over left ankle fracture site    Pain in left knee, both upper arms  Mild swelling in hands  Morning stiffness lasts about 1 hour    New glucose meter- Dexcom G7  On Ozempic    Went to urgent care 2x in past month and a half for pneumonia  Improved on antibiotics     Feeling like she hasn't gotten all her energy back s/p covid in 2020    Got flu vaccine, no covid booster    Signed up for gym recently    WOULD like naprosyn vs tizanidine      4/2024    Left knee hurts medially  Fingers still hurt and swell  Arms hurt    Morning stiffness 1/2 hour to 1 hour    On orencia     Tb,hepatitis UTD 1/2024    Low MCV 78  Was anemic for some time  Feels cold all the time  Will need iron   CBC nml  CMP nml  ESR,CRP nml      On cymbalta 30 to 60 mg daily  On gabapentin 100 mg x 2 daily      Past history : dm,htn    Family history : mom has arthritis     Social history : former smoker,quit 20 years ago        Review of Systems   Constitutional:  Negative for activity change, appetite change, chills, diaphoresis, fatigue, fever and unexpected weight change.   HENT:  Negative for congestion, dental problem, drooling, ear discharge,  ear pain, facial swelling, hearing loss, mouth sores, nosebleeds, postnasal drip, rhinorrhea, sinus pressure, sinus pain, sneezing, sore throat, tinnitus, trouble swallowing and voice change.    Eyes:  Negative for photophobia, pain, discharge, redness, itching and visual disturbance.   Respiratory:  Negative for apnea, cough, choking, chest tightness, shortness of breath, wheezing and stridor.    Cardiovascular:  Negative for chest pain, palpitations and leg swelling.   Gastrointestinal:  Negative for abdominal distention, abdominal pain, anal bleeding, blood in stool, constipation, diarrhea, nausea, rectal pain and vomiting.   Endocrine: Negative for cold intolerance, heat intolerance, polydipsia, polyphagia and polyuria.   Genitourinary:  Negative for decreased urine volume, difficulty urinating, dysuria, enuresis, flank pain, frequency, genital sores, hematuria and urgency.   Musculoskeletal:  Positive for arthralgias. Negative for back pain, gait problem, joint swelling, myalgias, neck pain and neck stiffness.   Skin:  Negative for color change, pallor, rash and wound.   Allergic/Immunologic: Negative for environmental allergies, food allergies and immunocompromised state.   Neurological:  Negative for dizziness, tremors, seizures, syncope, facial asymmetry, speech difficulty, weakness, light-headedness, numbness and headaches.   Hematological:  Negative for adenopathy. Does not bruise/bleed easily.   Psychiatric/Behavioral:  Negative for agitation, behavioral problems, confusion, decreased concentration, dysphoric mood, hallucinations, self-injury, sleep disturbance and suicidal ideas. The patient is not nervous/anxious and is not hyperactive.      Physical Exam  HOMUNCULUS UPDATED     Widespread pain index  Note the areas which the patient has had pain over the last week:                   Shoulder-girdle, left               Shoulder-girdle, right                         Upper arm left                        Upper arm right                         Lower arm left                       Lower arm right    Hip (buttock, trochanter) left  Hip (buttock, trochanter) right                           Upper leg, left                         Upper leg, right                           Lower leg, left                         Lower leg, right                                     Jaw, left                                   Jaw, right                                        Chest                                  Abdomen                               Upper back                              Lower back                                        Neck  Score will be from 0-19: 12/19                                         Symptom severity score  Fatigue 2  Waking Unrefreshed 2  Cognitive Symptoms 0   0 = no problem, 1=slight or mild problem 2= moderate; considerable problems often present and/or at a moderate level, 3 = severe, pervasive, continuous, life disturbing problem  For each of the 3 symptoms, indicate the level of severity over the past week using the Scale.  The symptom severity score is the sum of the severity of the 3 symptoms (fatigue, waking unrefreshed, and cognitive symptoms) plus the number of the following symptoms occurring during the previous 6 months:   Headaches 0  Pain or cramps in the lower abdomen 1  Depression 0  The final score is between 0 and 12 : 5/12                                        Criteria  Patient has fibromyalgia if the following 3 conditions are met:  1.  Widespread pain index greater than or equal to 7 and symptom severity score greater than or equal to 5 or widespread pain index between 3- 6, and symptom severity score greater than or equal to 9.    2.  Symptoms have been present in a similar level for at least 3 months  3.  The patient does not have a disorder that would otherwise sufficiently explain the pain        Physical Exam   Constitutional: She is oriented to person, place, and time. No  distress.   HENT:   Head: Normocephalic.   Mouth/Throat: Oropharynx is clear and moist.   Eyes: Pupils are equal, round, and reactive to light. Conjunctivae are normal. Right eye exhibits no discharge. Left eye exhibits no discharge. No scleral icterus.   Neck: No thyromegaly present.   Cardiovascular: Normal rate, regular rhythm and normal heart sounds.   Pulmonary/Chest: Effort normal and breath sounds normal. No stridor.   Abdominal: Soft. Bowel sounds are normal.   Musculoskeletal:         General: Normal range of motion.      Cervical back: Normal range of motion.   Lymphadenopathy:     She has no cervical adenopathy.   Neurological: She is alert and oriented to person, place, and time.   Skin: Skin is warm. No rash noted. She is not diaphoretic.   Psychiatric: Affect and judgment normal.       Assessment     62 year old black female with DM,HTN comes in with a new diagnosis of rheumatoid arthritis    She has joint pains for 2 years    Feet hurt  Hands hurt  Neck hurts    Elbows,shoulders,wrists can hurt some  Back can hurt some    Pain,swelling,stiffness+  EMS+    Right 2nd finger is stiff and doesn't bend    A PCP diagnosed her with rheumatoid arthritis  Sent her to rheumatology on napolean : got mtx : 4 pills weekly/folic acid  She has headaches and she cant tolerate it and has a rash with it     RF positive  CCP neg    She was on humira and she had some headaches   She saw ad on the tv and got scared of the side effects  She then tried ssz and plaquenil and that didn't work    Finally she agreed to start enbrel  She went on a drug trial for eczema and we couldn't give enbrel   She started enbrel later -didn't like the side effects    She also has   Right hip bursitis  Right shoulder tendinitis  Left knee OA  CPPD  RA      On orencia    On cymbalta 30 to 60 mg daily -for fibromyalgia  gabapentin      8/2022    On orencia weekly    On cymbalta 30 to 60 mg daily  On gabapentin 100 mg x 2 daily    Off  statins-muscle pain got better   She has not started anything else to replace     One episode of mtit-hokqkieptd-xyo fell forward on the right knee  She tried to catch herself and she has shoulder pain and arm pain,low back  Prior to the fall she was doing well  No fractures  No LOC    Low MCV  Mild anemia    On ozempic  Lost 20 pound weight loss  /76-only in the office visits she says      4/2023        On orencia weekly    On cymbalta 30 to 60 mg daily  On gabapentin 100 mg x 2 daily    She did well  But  :   Unfortunately had a major fall-this time it was not because her knees gave out   She had a refracture of the left ankle  Now the right wrist and arm- hurting and swollen  Right shoulder hurting- rotator cuff surgery pending     No other joints hurt    On ozempic       She has right sided dequervains tenosynovitis      1/2024    New swelling in bilateral lower extremities- on HCTZ  Started a couple days ago  No SOB, CP    Still taking Orencia weekly- no issues  Cymbalta 60 mg daily- sometimes takes 2 for bad pain  Gabapentin 100 BID    Tenderness over left ankle to mid foot fracture site    Pain in left knee, both upper arms  Mild swelling in hands  Morning stiffness lasts about 1 hour    New glucose meter- Dexcom G7  On Ozempic    Went to urgent care 2x in past month and a half for pneumonia  Improved on antibiotics     Feeling like she hasn't gotten all her energy back s/p covid in 2020    Got flu vaccine, no covid booster    Signed up for gym recently    WOULD like naprosyn vs tizanidine    Last time we injected her dequervains    CDAI 29      4/2024    Left knee hurts medially  Fingers still hurt and swell  Arms hurt    Morning stiffness 1/2 hour to 1 hour    On orencia     Tb,hepatitis UTD 1/2024    Low MCV 78  Was anemic for some time  Feels cold all the time  Will need iron   CBC nml  CMP nml  ESR,CRP nml      On cymbalta 30 to 60 mg daily  On gabapentin 100 mg x 2 daily    CDAI 23-High disease  activity     1. Rheumatoid arthritis involving both hands with positive rheumatoid factor    2. Uncontrolled type 2 diabetes mellitus with hyperglycemia, with long-term current use of insulin    3. Osteopenia of multiple sites                  F/u problem     Plan      Labs today    I will d/c orencia and change to actemra 162 mg weekly    As needed naprosyn only     Right shoulder RC tendinopathy-needing surgery    Knee PT    Falls    We have suggested :  Knee brace -to see if that gives her some support  Also she has some leg length discrepancy-hence suggested heel lift    Pain management :    Cymbalta 60 mg is ok  Gabapentin 100 bid  Flexeril 5 to 10 mg daily    voltaren gel topical      Tylenol 650 mg prn use     Eczema -better     Vaccines : flu,pneumonia and shingles,covid UTD     Offered acqua therapy-when ready    HLD,DM control    Iron daily sent     dexa osteopenia 2022,rpt ordered lapalco  Faizan 1200 mg and vit d 1000 IU suggested    Marisol was seen today for disease management and pain.    Diagnoses and all orders for this visit:    Rheumatoid arthritis involving both hands with positive rheumatoid factor  -     tocilizumab (ACTEMRA) 162 mg/0.9 mL injection; Inject 0.9 mLs (162 mg total) into the skin every 7 days.  -     CBC Auto Differential; Future  -     Comprehensive Metabolic Panel; Future  -     Sedimentation rate; Future  -     C-Reactive Protein; Future    Uncontrolled type 2 diabetes mellitus with hyperglycemia, with long-term current use of insulin    Osteopenia of multiple sites  -     DXA Bone Density Axial Skeleton 1 or more sites; Future    Other orders  -     Discontinue: abatacept (ORENCIA CLICKJECT) 125 mg/mL AtIn; Inject 125 mg into the skin every 7 days.  -     Discontinue: DULoxetine (CYMBALTA) 30 MG capsule; One to two tabs daily  -     Discontinue: gabapentin (NEURONTIN) 100 MG capsule; One to two capsules daily  -     DULoxetine (CYMBALTA) 30 MG capsule; One to two tabs daily  -      gabapentin (NEURONTIN) 100 MG capsule; One to two capsules daily  -     ferrous sulfate (FEOSOL) 325 mg (65 mg iron) Tab tablet; Take 1 tablet (325 mg total) by mouth daily with breakfast.  -     cyclobenzaprine (FLEXERIL) 5 MG tablet; Take 1-2 tablets (5-10 mg total) by mouth every evening.              rtc in 3 months

## 2024-04-26 DIAGNOSIS — M05.741 RHEUMATOID ARTHRITIS INVOLVING BOTH HANDS WITH POSITIVE RHEUMATOID FACTOR: Primary | ICD-10-CM

## 2024-04-26 DIAGNOSIS — M05.742 RHEUMATOID ARTHRITIS INVOLVING BOTH HANDS WITH POSITIVE RHEUMATOID FACTOR: Primary | ICD-10-CM

## 2024-04-26 RX ORDER — TOCILIZUMAB 180 MG/ML
162 INJECTION, SOLUTION SUBCUTANEOUS
Qty: 3.6 ML | Refills: 11 | Status: ACTIVE | OUTPATIENT
Start: 2024-04-26 | End: 2024-07-03

## 2024-05-01 DIAGNOSIS — E11.65 UNCONTROLLED TYPE 2 DIABETES MELLITUS WITH HYPERGLYCEMIA, WITH LONG-TERM CURRENT USE OF INSULIN: ICD-10-CM

## 2024-05-01 DIAGNOSIS — Z79.4 UNCONTROLLED TYPE 2 DIABETES MELLITUS WITH HYPERGLYCEMIA, WITH LONG-TERM CURRENT USE OF INSULIN: ICD-10-CM

## 2024-05-01 NOTE — TELEPHONE ENCOUNTER
----- Message from Dolores Meek sent at 5/1/2024 11:47 AM CDT -----  Regarding: self [phone]  .Type: Patient Call Back    Who called:self    What is the request in detail:patient requesting her G-7 sensors    [facility] - [place] - [address]  [address] [address]  Phone: [phone] Fax: [phone]    Can the clinic reply by [facility]?no    Would the patient rather a call back or a response via [facility]?call back    Best call back number [phone]

## 2024-05-02 RX ORDER — BLOOD-GLUCOSE SENSOR
EACH MISCELLANEOUS
Qty: 3 EACH | Refills: 11 | Status: SHIPPED | OUTPATIENT
Start: 2024-05-02 | End: 2024-05-16 | Stop reason: SDUPTHER

## 2024-05-08 DIAGNOSIS — E11.65 UNCONTROLLED TYPE 2 DIABETES MELLITUS WITH HYPERGLYCEMIA, WITH LONG-TERM CURRENT USE OF INSULIN: ICD-10-CM

## 2024-05-08 DIAGNOSIS — Z79.4 UNCONTROLLED TYPE 2 DIABETES MELLITUS WITH HYPERGLYCEMIA, WITH LONG-TERM CURRENT USE OF INSULIN: ICD-10-CM

## 2024-05-09 RX ORDER — SEMAGLUTIDE 1.34 MG/ML
INJECTION, SOLUTION SUBCUTANEOUS
Qty: 9 EACH | Refills: 3 | Status: SHIPPED | OUTPATIENT
Start: 2024-05-09 | End: 2024-05-16

## 2024-05-16 ENCOUNTER — OFFICE VISIT (OUTPATIENT)
Dept: ENDOCRINOLOGY | Facility: CLINIC | Age: 64
End: 2024-05-16
Payer: MEDICARE

## 2024-05-16 VITALS
DIASTOLIC BLOOD PRESSURE: 76 MMHG | WEIGHT: 168.63 LBS | HEART RATE: 83 BPM | SYSTOLIC BLOOD PRESSURE: 140 MMHG | BODY MASS INDEX: 28.06 KG/M2

## 2024-05-16 DIAGNOSIS — M05.742 RHEUMATOID ARTHRITIS INVOLVING BOTH HANDS WITH POSITIVE RHEUMATOID FACTOR: ICD-10-CM

## 2024-05-16 DIAGNOSIS — E66.09 CLASS 1 OBESITY DUE TO EXCESS CALORIES WITH SERIOUS COMORBIDITY AND BODY MASS INDEX (BMI) OF 30.0 TO 30.9 IN ADULT: ICD-10-CM

## 2024-05-16 DIAGNOSIS — M05.741 RHEUMATOID ARTHRITIS INVOLVING BOTH HANDS WITH POSITIVE RHEUMATOID FACTOR: ICD-10-CM

## 2024-05-16 DIAGNOSIS — I10 ESSENTIAL HYPERTENSION: ICD-10-CM

## 2024-05-16 DIAGNOSIS — E11.65 UNCONTROLLED TYPE 2 DIABETES MELLITUS WITH HYPERGLYCEMIA, WITH LONG-TERM CURRENT USE OF INSULIN: Primary | ICD-10-CM

## 2024-05-16 DIAGNOSIS — Z79.4 UNCONTROLLED TYPE 2 DIABETES MELLITUS WITH HYPERGLYCEMIA, WITH LONG-TERM CURRENT USE OF INSULIN: Primary | ICD-10-CM

## 2024-05-16 LAB — GLUCOSE SERPL-MCNC: 256 MG/DL (ref 70–110)

## 2024-05-16 PROCEDURE — 3008F BODY MASS INDEX DOCD: CPT | Mod: CPTII,S$GLB,, | Performed by: HOSPITALIST

## 2024-05-16 PROCEDURE — 99999 PR PBB SHADOW E&M-EST. PATIENT-LVL V: CPT | Mod: PBBFAC,,, | Performed by: HOSPITALIST

## 2024-05-16 PROCEDURE — 99215 OFFICE O/P EST HI 40 MIN: CPT | Mod: S$GLB,,, | Performed by: HOSPITALIST

## 2024-05-16 PROCEDURE — 82962 GLUCOSE BLOOD TEST: CPT | Mod: S$GLB,,, | Performed by: HOSPITALIST

## 2024-05-16 PROCEDURE — 3077F SYST BP >= 140 MM HG: CPT | Mod: CPTII,S$GLB,, | Performed by: HOSPITALIST

## 2024-05-16 PROCEDURE — 3078F DIAST BP <80 MM HG: CPT | Mod: CPTII,S$GLB,, | Performed by: HOSPITALIST

## 2024-05-16 PROCEDURE — 4010F ACE/ARB THERAPY RXD/TAKEN: CPT | Mod: CPTII,S$GLB,, | Performed by: HOSPITALIST

## 2024-05-16 PROCEDURE — 1159F MED LIST DOCD IN RCRD: CPT | Mod: CPTII,S$GLB,, | Performed by: HOSPITALIST

## 2024-05-16 RX ORDER — EMPAGLIFLOZIN 25 MG/1
25 TABLET, FILM COATED ORAL DAILY
Qty: 90 TABLET | Refills: 3 | Status: SHIPPED | OUTPATIENT
Start: 2024-05-16

## 2024-05-16 RX ORDER — EMPAGLIFLOZIN 25 MG/1
25 TABLET, FILM COATED ORAL
COMMUNITY
Start: 2024-04-17 | End: 2024-05-16 | Stop reason: SDUPTHER

## 2024-05-16 RX ORDER — INSULIN LISPRO 100 [IU]/ML
INJECTION, SOLUTION INTRAVENOUS; SUBCUTANEOUS
Qty: 45 ML | Refills: 6 | Status: SHIPPED | OUTPATIENT
Start: 2024-05-16

## 2024-05-16 RX ORDER — SEMAGLUTIDE 2.68 MG/ML
2 INJECTION, SOLUTION SUBCUTANEOUS
Qty: 9 ML | Refills: 3 | Status: SHIPPED | OUTPATIENT
Start: 2024-05-16 | End: 2025-05-16

## 2024-05-16 RX ORDER — PEN NEEDLE, DIABETIC 31 GX5/16"
NEEDLE, DISPOSABLE MISCELLANEOUS
Qty: 200 EACH | Refills: 6 | Status: SHIPPED | OUTPATIENT
Start: 2024-05-16

## 2024-05-16 RX ORDER — BLOOD-GLUCOSE SENSOR
EACH MISCELLANEOUS
Qty: 3 EACH | Refills: 11 | Status: SHIPPED | OUTPATIENT
Start: 2024-05-16

## 2024-05-16 RX ORDER — INSULIN GLARGINE 100 [IU]/ML
40 INJECTION, SOLUTION SUBCUTANEOUS NIGHTLY
Qty: 45 ML | Refills: 6 | Status: SHIPPED | OUTPATIENT
Start: 2024-05-16

## 2024-05-16 NOTE — ASSESSMENT & PLAN NOTE
- Body mass index is 28.06 kg/m².  - dietary discussion as above  - continue to monitor weight, weight loss noted  - increase Ozempic 2 mg once a week

## 2024-05-16 NOTE — PATIENT INSTRUCTIONS
__ Lantus 40 units nightly, 8 PM (EVERY DAY)   IF SUGAR BEFORE INJECTION IS LESS THAN 160, give 20 units  IF it is under 100, you can hold    __ Humalog (short acting) 24 units for each meals twice a day    IF SMALL Portion can give 18 units     __ Ozempic 2.0mg once a week injection    STOP  __ Meformin 1000mg twice a day with food      Goal for your blood sugar   In the morning, before breakfast: 100-140  Before going to bed: 100-140  Do not go to bed with glucose less than 100, have a small snack if lower than 100    Please check glucose 3 times a day (before breakfast, lunch, dinner and at bedtime).   You can keep track of the glucose with Glucose logs or any papers  Please filled out and bring back to next office visit for me to review  Document any (LOW BLOOD GLUCOSE) hypoglycemia  episode with date and time for me to review      Contact information:    Daquan Isaac M.D  [facility] [address] [facility] [address], [address]  [place] [address]    Office:  [phone]  Fax:  [phone]     ----------------------------------------------------------

## 2024-05-16 NOTE — ASSESSMENT & PLAN NOTE
- Diabetes is not at goal, given most current A1C. Chart review show A1c chronically above 9.5%   - Diabetes control is complicated by hyperglycemia, poor insight into diabetes care, lack of compliance and dietary indiscretion.  Patient is aware of this.  - Presented 5/16/2024 with A1c 13% she reports that she needs to get a surgery and needs her A1c below 7.5% for her surgeon to do her surgery.    - Discussed A1C goal and status, Discussed BP status and lipid goal/status.  - Discussed proper foot care monitoring and discussed routine eye exam  - Close adherence to lifestyle changes recommended.    - needs to follow up with endocrine regularly to ensure that she gets Dexcom G7    Plan  - I had a maged conversation with patient about the need for better glycemic control.  She verbalized understanding reports motivation now to get her glucose under control.  - Advised patient to continue Ozempic, increase to 2 mg once a week injection   - Advised patient the need for better insulin injection compliance, adjust insulin regimen:  Lantus  40 units nightly, Humalog before each meal: 18 units twice a day if eating 2 meals.  - Continue the use of Jardiance 25 mg daily, OK stop stop metformin  - Need to have strict low carbs for next 2 months if she want better glucose control for her surgery>> SHE is aware  - New prescription to be faxed to [facility] for Dexcom G7  - Discussed proper insulin injection technique: Including rotation of injection sites, using new insulin needles  - Diabetic supplies/medications were reviewed this visit to ensure continue steady supplies  - Follow up as scheduled: 3 months

## 2024-05-16 NOTE — PROGRESS NOTES
Subjective:      Patient ID: Marisol Rosales is a 63 y.o. female presented to [facility] on 5/16/2024.  Chief Complaint:  Diabetes      History of Present Illness: Marisol Rosales is a 63 y.o. female here for type 2 diabetes  Other significant past medical history: RA (follow with rheum), HTN, Obesity    With regards to Diabetes Mellitus Type 2  - Known diabetic complications: peripheral neuropathy  - Diagnosed w/ DM: >15 years ago, on insulin for >8 years  - history of poorly-controlled diabetes, with multiple no show.  Poor insulin compliance.  A1c has been above 9.5% since under my care 2022.  Lost to follow-up, moved to [place] 2023.  5/16/2024 presents for follow-up.  Longstanding history of not following diet changes.  Sugar craving leading to frequent snacking.    Interval history:  Here for follow-up type 2 diabetes.  Lost to follow-up, last office visit 06/2023.  Multiple cancellation/no-show afterwards.  A1c unfortunately it was 13% on 12/28/2023 outside of [facility]   Here it was 10.4 on 9/13/2023   Patient reports poor compliance with MDI insulin.  She reports that her providers have increase her insulin to Lantus 85 units nightly (patient admits that she only gives this injection twice a week.  Often goes without injecting Lantus) she also reports that she was told to give 18 units of Humalog twice a day with breakfast and with dinner.  She reports that she does miss dose at least 2 to 3 times a week   He she is currently on Ozempic 1 mg once a week.    Currently not on Dexcom due to supply issues.  And moving.  Most current weight:  168  previous 177,171<168  In clinic glucose check today:  256    Need to have elective surgery, need A1C to be <7  She reports her glucose at home running around   165-200  140-190  165-275    4 mo ago     Hemoglobin A1C 4.2 - 6.5 % 13.0 Abnormal    Specimen Collected: 12/28/23 10:42 Last Resulted: 12/28/23 10:42   Received From: [facility] [place]  [facility]  Result Received: 01/18/24 08:36       Current meds:    Lantus 85 units nightly, patient admits that she only gives this injection twice a week.    Humalog 18 units twice a day with meal, breakfast and dinner (does miss doses)  Jardiance 25 mg daily  Ozempic 1.0mg once a week injection  meformin 1000mg twice a day with food  Previous meds:              Glipizide  Home glucose checks: checks 2x a day, Logs reviewed/Unavailable: oral recall:  As above.  Hyperglycemia no hypoglycemia      Diet/Exercise:               Eating 2x meals per day               Snacking, craving sweets              Drink: water, coke zero  Weight trend: decreasing steadily  Diabetes Education: No  Diabetes Related Hospitalization:  No  Hx of pancreatitis, hx of thyroid cancer: No  Family history of diabetes: Yes, grandmother and brother  Occupation: not working, disabled     Eye exam current (within one year): yes, DR: no  Reports cuts or ulcers on feet: yes 4/4/2022, Denies  Statin: Not taking, ACE/ARB: Taking    Diabetes lab work  Lab Results   Component Value Date    HGBA1C 10.4 (H) 09/13/2023    HGBA1C 9.0 (H) 06/01/2023    HGBA1C 9.8 (H) 11/28/2022    HGBA1C 10.5 (H) 08/22/2022     Lab Results   Component Value Date    CPEPTIDE 4.62 11/28/2022      No results found for: "FRUCTOSAMINE"  Lab Results   Component Value Date    MICALBCREAT 9.6 08/22/2022     No results found for: "[id number]"    Diabetes Management Status: Reviewed this office visit  Screening or Prevention Patient's value Goal Complete/Controlled?   Lipid profile : 08/22/2022 Annually Yes     Dilated retinal exam : 03/02/2022 Annually Yes     Foot exam   : 06/27/2022 Annually Yes        Reviewed past surgical, medical, family, social history and updated as appropriate.  Review of Systems: see HPI above  Objective:   BP (!) 140/76   Pulse 83   Wt 76.5 kg (168 lb 9.6 oz)   BMI 28.06 kg/m²     Body mass index is 28.06 kg/m².  Vital signs reviewed    Physical " "Exam  Vitals and nursing note reviewed.   Constitutional:       Appearance: Normal appearance. She is well-developed. She is not ill-appearing.   Neck:      Thyroid: No thyromegaly.   Pulmonary:      Effort: Pulmonary effort is normal. No respiratory distress.   Musculoskeletal:         General: Normal range of motion.      Cervical back: Normal range of motion.   Neurological:      General: No focal deficit present.      Mental Status: She is alert. Mental status is at baseline.   Psychiatric:         Mood and Affect: Mood normal.         Behavior: Behavior normal.     Lab Reviewed:  See results in subjective  Lab Results   Component Value Date    HGBA1C 10.4 (H) 09/13/2023     Lab Results   Component Value Date    CHOL 195 08/22/2022    HDL 57 08/22/2022    LDLCALC 124.2 08/22/2022    TRIG 69 08/22/2022    CHOLHDL 29.2 08/22/2022     Lab Results   Component Value Date     04/19/2024    K 4.1 04/19/2024     04/19/2024    CO2 28 04/19/2024     (H) 04/19/2024    BUN 16 04/19/2024    CREATININE 0.8 04/19/2024    CALCIUM 9.7 04/19/2024    PROT 7.3 04/19/2024    ALBUMIN 3.6 04/19/2024    BILITOT 0.5 04/19/2024    ALKPHOS 128 04/19/2024    AST 15 04/19/2024    ALT 19 04/19/2024    ANIONGAP 8 04/19/2024    EGFRNORACEVR >60.0 04/19/2024    TSH 1.524 08/15/2022    TBZUTMZL27DF 31 11/28/2022     Assessment     1. Uncontrolled type 2 diabetes mellitus with hyperglycemia, with long-term current use of insulin  DEXCOM G7 SENSOR Nika    LANTUS SOLOSTAR U-100 INSULIN 100 unit/mL (3 mL) InPn pen    JARDIANCE 25 mg tablet    POCT Glucose, Hand-Held Device    HUMALOG KWIKPEN INSULIN 100 unit/mL pen    semaglutide (OZEMPIC) 2 mg/dose (8 mg/3 mL) PnIj    BD ULTRA-FINE SHORT PEN NEEDLE 31 gauge x 5/16" Ndle    HEMOGLOBIN A1C    Basic Metabolic Panel      2. Class 1 obesity due to excess calories with serious comorbidity and body mass index (BMI) of 30.0 to 30.9 in adult        3. Essential hypertension        4. " Rheumatoid arthritis involving both hands with positive rheumatoid factor          Plan     Uncontrolled type 2 diabetes mellitus with hyperglycemia, with long-term current use of insulin  - Diabetes is not at goal, given most current A1C. Chart review show A1c chronically above 9.5%   - Diabetes control is complicated by hyperglycemia, poor insight into diabetes care, lack of compliance and dietary indiscretion.  Patient is aware of this.  - Presented 5/16/2024 with A1c 13% she reports that she needs to get a surgery and needs her A1c below 7.5% for her surgeon to do her surgery.    - Discussed A1C goal and status, Discussed BP status and lipid goal/status.  - Discussed proper foot care monitoring and discussed routine eye exam  - Close adherence to lifestyle changes recommended.    - needs to follow up with endocrine regularly to ensure that she gets Dexcom G7    Plan  - I had a maged conversation with patient about the need for better glycemic control.  She verbalized understanding reports motivation now to get her glucose under control.  - Advised patient to continue Ozempic, increase to 2 mg once a week injection   - Advised patient the need for better insulin injection compliance, adjust insulin regimen:  Lantus  40 units nightly, Humalog before each meal: 18 units twice a day if eating 2 meals.  - Continue the use of Jardiance 25 mg daily, OK stop stop metformin  - Need to have strict low carbs for next 2 months if she want better glucose control for her surgery>> SHE is aware  - New prescription to be faxed to [facility] for Dexcom G7  - Discussed proper insulin injection technique: Including rotation of injection sites, using new insulin needles  - Diabetic supplies/medications were reviewed this visit to ensure continue steady supplies  - Follow up as scheduled: 3 months        Class 1 obesity due to excess calories with serious comorbidity and body mass index (BMI) of 30.0 to 30.9 in adult  - Body mass index is  28.06 kg/m².  - dietary discussion as above  - continue to monitor weight, weight loss noted  - increase Ozempic 2 mg once a week    Essential hypertension  - blood pressure review in clinic today  - manage by PCP    Rheumatoid arthritis involving both hands with positive rheumatoid factor  - continue management by Rheumatology, not on steroid    Advised patient to follow up with PCP for routine health maintenance care.   RTC in 3  months    Total Time for E/M Service preformed was greater than 40minutes: Including review of medical records, direct face-to-face time with patient with discussion multiple separate endocrine issues. Along with active medical decision-making in office today.     Daquan Isaac M.D.  Endocrinology  [facility]  5/16/2024      Disclaimer: This note has been generated in part with the use of voice-recognition software. There may be typographical errors that have been missed during proof-reading.  -------------------------------------------------------------------------------------------------  Indications for CGMs:  Patent with diagnosed: Diabetes Mellitus that required frequent glucose monitoring (4 + times a day and 4x/day testing), frequent adjustments to insulin regiment based on BG or CGM results. Patent requires a therapeutic CGM and is willing to use therapeutic CGM/SMBG for Necessary frequent adjustments in insulin therapy, patient demonstrated an understanding of the technology (can use and recognize alerts and alarms). I, the physician, have assessed adherence to CGM regimen and to the plan, patient will have close follow up in clinic as indicated, every 3 months to 6 months.     Patient experiences (including but not limited to):  [x]   Discrepancies between records and A1C, at risk severe hypoglycemia episode and hospitalization  [x]   Recurrent, unexplained, severe hypoglycemic episodes  [x]   Postprandial hyperglycemia  [x]   Unexplained blood  glucose excursions  []   Impaired awareness of hypoglycemia    []   Patient uses insulin pump for diabetes management: will need frequent adjustments to insulin regiment based on BG: including adjustment to  insulin pump setting, sliding scale, correction factor, additional bolusing/correction    I believe that the patient will greatly benefit from wearing CGM because this will allow for better glucose control, help to avoid high/lows and prevent hospitalization.  This also is safer for patient in using CGM during the COVID public health emergency.

## 2024-06-12 ENCOUNTER — PATIENT OUTREACH (OUTPATIENT)
Dept: DIABETES | Facility: CLINIC | Age: 64
End: 2024-06-12
Payer: MEDICARE

## 2024-06-12 NOTE — PATIENT INSTRUCTIONS
Returned call to patient who wants to come back in to restart her Dexcom G7 via .  Pt has been without her G7 supplies for quite some time; states needed MD to reorder  AND where supplies come from has changed. No longer [employer]; pt thinks its [facility].  Appt made for 6/21/24 at 9 AM per pt request.

## 2024-06-14 ENCOUNTER — TELEPHONE (OUTPATIENT)
Dept: OTOLARYNGOLOGY | Facility: CLINIC | Age: 64
End: 2024-06-14
Payer: MEDICARE

## 2024-06-14 NOTE — TELEPHONE ENCOUNTER
Attempted to call back number listed. No answer. Tried preferred number in chart, no answer there either. I was able to reschedule pt to 6/18/24 at 2 pm. That is the soonest available appt. Will also send My Chart message to pt. Thanks, Mellissa

## 2024-06-14 NOTE — TELEPHONE ENCOUNTER
----- Message from Silvina Davis sent at 6/14/2024  3:13 PM CDT -----  Contact: self  Type:  Sooner Appointment Request    Caller is requesting a sooner appointment.  Caller declined first available appointment listed below.  Caller will not accept being placed on the waitlist and is requesting a message be sent to doctor.    Name of Caller:  the patient  When is the first available appointment?  N/a  Symptoms:  nodule in nose  Would the patient rather a call back or a response via [facility]? call  Best Call Back Number:  [phone]  Additional Information:  pt inadvertently missed her appt 6/13 ([phone]) she also has this number for today/Friday [phone]. She would really like to reschedule asap.

## 2024-06-17 NOTE — TELEPHONE ENCOUNTER
"Called pt again. Spoke to her this morning. Advised that I have rescheduled the appt to tomorrow at 2 pm. Advised pt that if that does not work, the next appts are in Aug/Sept. Pt accepted the appt tomorrow. Pt wanted to know if Dr. Parra would remove the "polyp" tomorrow? Advised most likely not, Dr. Parra would evaluate her and if appropriate, may set up removal at a later date. ThanksMellissa  "

## 2024-06-18 ENCOUNTER — OFFICE VISIT (OUTPATIENT)
Dept: OTOLARYNGOLOGY | Facility: CLINIC | Age: 64
End: 2024-06-18
Payer: MEDICARE

## 2024-06-18 VITALS
BODY MASS INDEX: 26.79 KG/M2 | SYSTOLIC BLOOD PRESSURE: 137 MMHG | HEART RATE: 83 BPM | HEIGHT: 66 IN | DIASTOLIC BLOOD PRESSURE: 74 MMHG | WEIGHT: 166.69 LBS

## 2024-06-18 DIAGNOSIS — J34.89 NASAL VESTIBULITIS: ICD-10-CM

## 2024-06-18 DIAGNOSIS — D49.1: Primary | ICD-10-CM

## 2024-06-18 PROCEDURE — 4010F ACE/ARB THERAPY RXD/TAKEN: CPT | Mod: CPTII,S$GLB,, | Performed by: OTOLARYNGOLOGY

## 2024-06-18 PROCEDURE — 3078F DIAST BP <80 MM HG: CPT | Mod: CPTII,S$GLB,, | Performed by: OTOLARYNGOLOGY

## 2024-06-18 PROCEDURE — 99999 PR PBB SHADOW E&M-EST. PATIENT-LVL V: CPT | Mod: PBBFAC,,, | Performed by: OTOLARYNGOLOGY

## 2024-06-18 PROCEDURE — 99204 OFFICE O/P NEW MOD 45 MIN: CPT | Mod: S$GLB,,, | Performed by: OTOLARYNGOLOGY

## 2024-06-18 PROCEDURE — 1160F RVW MEDS BY RX/DR IN RCRD: CPT | Mod: CPTII,S$GLB,, | Performed by: OTOLARYNGOLOGY

## 2024-06-18 PROCEDURE — 3008F BODY MASS INDEX DOCD: CPT | Mod: CPTII,S$GLB,, | Performed by: OTOLARYNGOLOGY

## 2024-06-18 PROCEDURE — 3075F SYST BP GE 130 - 139MM HG: CPT | Mod: CPTII,S$GLB,, | Performed by: OTOLARYNGOLOGY

## 2024-06-18 PROCEDURE — 1159F MED LIST DOCD IN RCRD: CPT | Mod: CPTII,S$GLB,, | Performed by: OTOLARYNGOLOGY

## 2024-06-18 RX ORDER — MUPIROCIN 20 MG/G
OINTMENT TOPICAL 3 TIMES DAILY
Qty: 22 G | Refills: 0 | Status: SHIPPED | OUTPATIENT
Start: 2024-06-18 | End: 2024-06-25

## 2024-06-18 NOTE — PATIENT INSTRUCTIONS
Mupirocin ointment into the right nostril twice daily or even 3 times daily for no more than a week then switch to Aquaphor.  General dry nose care as outlined.      Follow up in the coming 3-4 weeks for shave biopsy of this intranasal nodule.  As discussed, additional treatment may prove necessary depending upon the results of the biopsy and how well it heals from that biopsy.    DRY NOSE CARE    Use lots of saline throughout the day to keep the nose moist.  Consider using saline gel for longer-term moisturizing.  Aquaphor or Vaseline should be applied to the nostrils at night when needed for crusting/more severe dryness. Antibiotic ointment can be used up to a week for tender dryness/crusting.  Follow-up for further evaluation treatment if symptoms of are not resolved.    Voice recognition software was used in the creation of this note/communication and any sound-alike errors which may have occurred from its use should be taken in context when interpreting.  If such errors prevent a clear understanding of the note/communication, please contact the office for clarification.

## 2024-06-18 NOTE — PROGRESS NOTES
[facility]    Subjective:      Patient: Marisol Rosales Patient PCP: Caridad Selby FNP         :  1960     Sex:  female      MRN:  [id number]          Date of Visit: 2024      Chief Complaint: Mass ( has a growth in her right nostril.  has been there for many years. States feels that it is scab over growth right now due to blowing nose a lot. [facility] did see ENT in [place] recently, and was told would have to go to [place] to get them to remove it. Wanted to see someone closer to home. )      Patient ID: Marisol Rosales is a 63 y.o. female     Patient is a  former smoker with a past medical history of RA on Actemra, type 2 diabetes, obesity, HTN self-referred for right nasal growth scabbing irritation for several years.  No prior biopsy but excisional biopsy was recommended by prior ENT.  No history of skin cancer.  No maged epistaxis facial pain or nasal obstruction.  Believes it 1st came up when the COVID pandemic 1st started 2020.  No real notable change since then.    Labs:  WBC   Date Value Ref Range Status   2024 5.97 3.90 - 12.70 K/uL Final     Hemoglobin   Date Value Ref Range Status   2024 12.6 12.0 - 16.0 g/dL Final     Platelets   Date Value Ref Range Status   2024 356 150 - 450 K/uL Final     Creatinine   Date Value Ref Range Status   2024 0.8 0.5 - 1.4 mg/dL Final     TSH   Date Value Ref Range Status   08/15/2022 1.524 0.400 - 4.000 uIU/mL Final     Hemoglobin A1C   Date Value Ref Range Status   2023 10.4 (H) 4.0 - 5.6 % Final     Comment:     ADA Screening Guidelines:  5.7-6.4%  Consistent with prediabetes  >or=6.5%  Consistent with diabetes    High levels of fetal hemoglobin interfere with the HbA1C  assay. Heterozygous hemoglobin variants (HbS, HgC, etc)do  not significantly interfere with this assay.   However, presence of multiple variants may affect accuracy.         Past Medical History  She has a past medical history of Allergy,  "Asthma, Back pain, Diabetes mellitus, and Hypertension.    Family / Surgical / Social History  Her family history is not on file.    Past Surgical History:   Procedure Laterality Date    HYSTERECTOMY         Social History     Tobacco Use    Smoking status: Former    Smokeless tobacco: Never   Substance and Sexual Activity    Alcohol use: No    Drug use: No    Sexual activity: Not on file       Medications  She has a current medication list which includes the following prescription(s): albuterol, amlodipine, ascorbic acid (vitamin c), aspirin, atorvastatin, bd ultra-fine short pen needle, betamethasone dipropionate, blood sugar diagnostic, blood-glucose meter, cetirizine, clobetasol 0.05%, cyanocobalamin (vitamin b-12), cyclobenzaprine, dexcom g7 , dexcom g7 sensor, diclofenac sodium, diphenhydramine, duloxetine, elderberry fruit, enalapril, ferrous sulfate, fluticasone propionate, gabapentin, humalog kwikpen insulin, hydralazine, hydrochlorothiazide, ibu, insulin glargine-yfgn, jardiance, ketoconazole, lancing device, lantus solostar u-100 insulin, linzess, naproxen, nebulizer and compressor, promethazine-dextromethorphan, proventil hfa, ozempic, sodium chloride, actemra actpen, triamcinolone acetonide 0.1%, trueplus lancets, and [DISCONTINUED] methotrexate, and the following Facility-Administered Medications: triamcinolone acetonide.      Allergies  Review of patient's allergies indicates:   Allergen Reactions    Codeine Other (See Comments) and Anxiety     "it makes me feel crazy"    Prednisone Palpitations and Anxiety     "it makes my heart beat fast. I can take the shot"       All medications, allergies, and past history have been reviewed.    Objective:      Vitals:      4/19/2024    10:39 AM 5/16/2024     3:16 PM 6/18/2024     2:04 PM   Vitals - 1 value per visit   SYSTOLIC 157 140 137   DIASTOLIC 70 76 74   Pulse 74 83 83   Weight (lb) 169.75 168.6 166.67   Weight (kg) 77 76.476 75.6   Height 5' 5" " "(1.651 m)  5' 6" (1.676 m)   BMI (Calculated) 28.2  26.9   Pain Score Six  Zero       Body surface area is 1.88 meters squared.    Physical Exam:    GENERAL  APPEARANCE -  alert, appears stated age, and cooperative  BARRIER(S) TO COMMUNICATION -  none VOICE - appropriate for age and gender    INTEGUMENTARY  no suspicious head and neck lesions    HEENT  HEAD: Normocephalic, without obvious abnormality, atraumatic  FACE: INSPECTION - Symmetric, no signs of trauma, no suspicious lesion(s)      STRENGTH - facial symmetry intact     PALPATION -  No masses     SALIVARY GLANDS - non-tender with no appreciable mass    NECK/THYROID: normal atraumatic, no neck masses, normal thyroid, no jvd    EYES  Normal occular alignment and mobility with no visible nystagmus at rest    EARS/NOSE/MOUTH/THROAT  EARS  PINNAE AND EXTERNAL EARS - no suspicious lesion OTOSCOPIC EXAM (surgical microscopy was not used for visualization/instrumentation): EAR EXAM - Normal ear canals, tympanic membranes and mobility, and middle ear spaces bilaterally.  HEARING - grossly intact to voice/finger rub    NOSE AND SINUSES  EXTERNAL NOSE - inside the right nasal ala (see photo is a rounded smooth pink mass.  There is some crusting and ulceration inferior to this in the inferolateral nasal ala/sill.  No other suspicious findings.  SEPTUM - normal/no obstruction on anterior exam without decongestion TURBINATES - within normal limits MUCOSA - within normal limits           MOUTH AND THROAT   ORAL CAVITY, LIPS, TEETH, GUMS & TONGUE - moist, no suspicious lesions  OROPHARYNX /TONSILS/PHARYNGEAL WALLS/HYPOPHARYNX - no erythema or exudates  NASOPHARYNX - limited mirror exam - unable to visualize due to anatomy/gag  LARYNX -  - limited mirror exam - unable to visualize due to anatomy/gag      CHEST AND LUNG   INSPECTION & AUSCULTATION - normal effort, no stridor    CARDIOVASCULAR  AUSCULTATION & PERIPHERAL VASCULAR - regular rate and rhythm.    NEUROLOGIC  MENTAL " STATUS - alert, interactive CRANIAL NERVES - normal    LYMPHATIC  HEAD AND NECK - non-palpable; SUPRACLAVICULAR - deferred      Procedure(s):  None          Assessment:      Problem List Items Addressed This Visit    None  Visit Diagnoses       Neoplasm of nasal vestibule    -  Primary    Nasal vestibulitis                     Plan:      Mupirocin ointment into the right nostril twice daily or even 3 times daily for no more than a week then switch to Aquaphor.  General dry nose care as outlined.      Follow up in the coming 3-4 weeks for shave biopsy of this intranasal nodule.  As discussed, additional treatment may prove necessary depending upon the results of the biopsy and how well it heals from that biopsy.          Voice recognition software was used in the creation of this note/communication and any sound-alike errors which may have occurred from its use should be taken in context when interpreting.  If such errors prevent a clear understanding of the note/communication, please contact the office for clarification.

## 2024-06-21 ENCOUNTER — PATIENT OUTREACH (OUTPATIENT)
Dept: DIABETES | Facility: CLINIC | Age: 64
End: 2024-06-21
Payer: MEDICARE

## 2024-06-21 NOTE — PATIENT INSTRUCTIONS
Returned call to pt.  She did not realize that she did not have any DexcomG7 sensors. She will contact [facility] to reorder supplies.  Rescheduled appt to restart CGM for July 1 at 8:30 AM

## 2024-07-01 ENCOUNTER — PATIENT OUTREACH (OUTPATIENT)
Dept: DIABETES | Facility: CLINIC | Age: 64
End: 2024-07-01
Payer: MEDICARE

## 2024-07-01 NOTE — PATIENT INSTRUCTIONS
[facility] DM Education.  She needed phone number to contact [facility] about sensors.  She will call back to reschedule.

## 2024-07-22 ENCOUNTER — PATIENT OUTREACH (OUTPATIENT)
Dept: DIABETES | Facility: CLINIC | Age: 64
End: 2024-07-22
Payer: MEDICARE

## 2024-07-22 NOTE — PATIENT INSTRUCTIONS
Pt had appt for DM Education to restart her Dexcom G7 on her ; appt was for 7/1/24 but pt did not keep appt.  Called today to see if pt would like to reschedule that appt. Pt states she needs to find her ; since moving she has not found it but she does believe the  is in a container that she has not gone through yet.  Pt will look for it and then call to make appt to restart the device.

## 2024-07-31 ENCOUNTER — PROCEDURE VISIT (OUTPATIENT)
Dept: OTOLARYNGOLOGY | Facility: CLINIC | Age: 64
End: 2024-07-31
Payer: MEDICARE

## 2024-07-31 VITALS
BODY MASS INDEX: 26.96 KG/M2 | WEIGHT: 167.75 LBS | SYSTOLIC BLOOD PRESSURE: 161 MMHG | HEART RATE: 70 BPM | RESPIRATION RATE: 18 BRPM | HEIGHT: 66 IN | DIASTOLIC BLOOD PRESSURE: 77 MMHG

## 2024-07-31 DIAGNOSIS — D38.5 NEOPLASM OF UNCERTAIN BEHAVIOR OF NASAL CAVITIES: Primary | ICD-10-CM

## 2024-07-31 PROCEDURE — 30100 INTRANASAL BIOPSY: CPT | Mod: S$GLB,,, | Performed by: OTOLARYNGOLOGY

## 2024-07-31 PROCEDURE — 88305 TISSUE EXAM BY PATHOLOGIST: CPT | Performed by: STUDENT IN AN ORGANIZED HEALTH CARE EDUCATION/TRAINING PROGRAM

## 2024-07-31 NOTE — PATIENT INSTRUCTIONS
Use lots of nasal saline to keep the nose moist.  Apply Aquaphor to the excision site.  If bleeding occurs apply direct pressure even cotton in the nose and direct pressure as shown in the office.  Contact the office if not notified of results through the my [facility] portal within the next 2 weeks.  Return for follow up with any concerns.    Voice recognition software was used in the creation of this note/communication and any sound-alike errors which may have occurred from its use should be taken in context when interpreting.  If such errors prevent a clear understanding of the note/communication, please contact the office for clarification.      DRY NOSE CARE    Use lots of saline throughout the day to keep the nose moist.  Consider using saline gel for longer-term moisturizing.  Aquaphor or Vaseline should be applied to the nostrils at night when needed for crusting/more severe dryness. Antibiotic ointment can be used up to a week for tender dryness/crusting.  Follow-up for further evaluation treatment if symptoms of are not resolved.

## 2024-08-02 ENCOUNTER — PATIENT MESSAGE (OUTPATIENT)
Dept: OTOLARYNGOLOGY | Facility: CLINIC | Age: 64
End: 2024-08-02
Payer: MEDICARE

## 2024-08-02 LAB
FINAL PATHOLOGIC DIAGNOSIS: NORMAL
GROSS: NORMAL
Lab: NORMAL

## 2024-08-21 ENCOUNTER — TELEPHONE (OUTPATIENT)
Dept: OTOLARYNGOLOGY | Facility: CLINIC | Age: 64
End: 2024-08-21
Payer: MEDICARE

## 2024-08-21 NOTE — TELEPHONE ENCOUNTER
----- Message from Dhruv Moore sent at 8/21/2024  4:11 PM CDT -----  Contact: self  Type: Needs Medical Advice  Who Called:  PT    Best Call Back Number: [phone]   Additional Information: Calling to check the results of the biopsy from Wed procedure.

## 2024-08-21 NOTE — TELEPHONE ENCOUNTER
Called pt--looked back in the chart and found the message that Dr. Parra sent her with results. It did not appear that pt read the message. I read it to her. Pt verbalized understanding. States that she is healing well from the procedure. Pt will call back if any further ENT concerns. Thanks, Mellissa

## 2024-08-30 ENCOUNTER — HOSPITAL ENCOUNTER (OUTPATIENT)
Dept: RADIOLOGY | Facility: CLINIC | Age: 64
Discharge: HOME | End: 2024-08-30
Attending: INTERNAL MEDICINE
Payer: MEDICARE

## 2024-08-30 DIAGNOSIS — M85.89 OSTEOPENIA OF MULTIPLE SITES: ICD-10-CM

## 2024-08-30 PROCEDURE — 77080 DXA BONE DENSITY AXIAL: CPT | Mod: TC,PO

## 2024-08-30 PROCEDURE — 77080 DXA BONE DENSITY AXIAL: CPT | Mod: 26,,, | Performed by: RADIOLOGY

## 2024-09-03 ENCOUNTER — TELEPHONE (OUTPATIENT)
Dept: DIABETES | Facility: CLINIC | Age: 64
End: 2024-09-03
Payer: MEDICARE

## 2024-09-03 NOTE — TELEPHONE ENCOUNTER
Called patient to report I have no upcoming appointments earlier than 4 pm. She requests 4 pm Friday for Dexcom start.

## 2024-09-06 ENCOUNTER — TELEPHONE (OUTPATIENT)
Dept: DIABETES | Facility: CLINIC | Age: 64
End: 2024-09-06
Payer: MEDICARE

## 2024-09-06 NOTE — TELEPHONE ENCOUNTER
Call patient to offer sooner appointment at more desirable time as she expressed explicitly our last two calls. She has to cancel the 4:00 pm appt today too.  Needs appt next week.

## 2024-09-09 ENCOUNTER — PATIENT OUTREACH (OUTPATIENT)
Dept: DIABETES | Facility: CLINIC | Age: 64
End: 2024-09-09
Payer: MEDICARE

## 2024-09-09 NOTE — PATIENT INSTRUCTIONS
Returned call to pt who states she cannot make appt this Thursday 9/12/24, but would like to try to come in the following day 9/13/24 at 2:30 pm.  Appt rescheduled per pt request.

## 2024-09-13 ENCOUNTER — PATIENT OUTREACH (OUTPATIENT)
Dept: DIABETES | Facility: CLINIC | Age: 64
End: 2024-09-13
Payer: MEDICARE

## 2024-09-13 NOTE — PATIENT INSTRUCTIONS
Called pt about appt today for DM Education to restart her Dexcom G7 via . Pt not able to make it today; would prefer Monday 9/16/24 at 9 AM.  Pt rescheduled and address provided to pt again.

## 2024-09-16 ENCOUNTER — PATIENT OUTREACH (OUTPATIENT)
Dept: DIABETES | Facility: CLINIC | Age: 64
End: 2024-09-16
Payer: MEDICARE

## 2024-09-16 NOTE — PATIENT INSTRUCTIONS
Pt came for appt today to restart CGM, upgrading to Dexcom G7.  Pt brought G7 sensors but brought G6 .  Pt does not want to use her cell phone.  Pt will call Educator with her work schedule so we can find another time and date that work for her.

## 2024-09-18 ENCOUNTER — PATIENT OUTREACH (OUTPATIENT)
Dept: DIABETES | Facility: CLINIC | Age: 64
End: 2024-09-18
Payer: MEDICARE

## 2024-09-18 NOTE — PATIENT INSTRUCTIONS
Patient called to say she would like to come in next Wed 9/25/24 for her f/u appt.  Called pt to confirm with her that 2:30 pm would work and she states that's fine.Appt booked.

## 2024-09-25 ENCOUNTER — PATIENT OUTREACH (OUTPATIENT)
Dept: DIABETES | Facility: CLINIC | Age: 64
End: 2024-09-25
Payer: MEDICARE

## 2024-09-25 NOTE — PATIENT INSTRUCTIONS
Pt called  to reschedule today's appt to start Dexcom G7.  Pt will come in on 9/30/24 at 10 AM.  Educator will provide G7  for pt

## 2024-09-30 ENCOUNTER — NUTRITION (OUTPATIENT)
Dept: DIABETES | Facility: CLINIC | Age: 64
End: 2024-09-30
Payer: MEDICARE

## 2024-09-30 DIAGNOSIS — Z79.4 UNCONTROLLED TYPE 2 DIABETES MELLITUS WITH HYPERGLYCEMIA, WITH LONG-TERM CURRENT USE OF INSULIN: Primary | ICD-10-CM

## 2024-09-30 DIAGNOSIS — E11.65 UNCONTROLLED TYPE 2 DIABETES MELLITUS WITH HYPERGLYCEMIA, WITH LONG-TERM CURRENT USE OF INSULIN: Primary | ICD-10-CM

## 2024-09-30 PROCEDURE — 99999 PR PBB SHADOW E&M-EST. PATIENT-LVL III: CPT | Mod: PBBFAC,,, | Performed by: NUTRITIONIST

## 2024-09-30 PROCEDURE — G0108 DIAB MANAGE TRN  PER INDIV: HCPCS | Mod: S$GLB,,, | Performed by: NUTRITIONIST

## 2024-09-30 NOTE — PROGRESS NOTES
Diabetes Care Specialist Progress Note  Author: Raina Lara RD  Date: 9/30/2024    Referral 1/4/24  First Dexcom G7 training 1/18/24    **Pt 39 minutes late today**    Intake    Program Intake  Reason for Diabetes Program Visit:: Intervention  Type of Intervention:: Individual  Individual: Device Training (Restart/train for Dexcom G7 via )  Device Training: Personal CGM  Current diabetes risk level:: moderate (T2DM Outcomes Risk=2)  In the last 12 months, have you:: none  Permission to speak with others about care:: no    Current Diabetes Treatment: DM Injectables, Insulin  DM Injectables Type/Dose:  (1 mg Ozempic on FRI)  Method of insulin delivery?: Injections  Injection Type: Pens  Pen Type/Dose:  (Lantus 80 units HS (8 pm); Humalog 18 units 5-15 mins before meals)    Continuous Glucose Monitoring  Patient has CGM: Yes  Personal CGM type::  (restart Dexcom G7 w/ today)    Lab Results   Component Value Date    HGBA1C 10.4 (H) 09/13/2023     Diabetes Self-Management Skills Assessment    Medication Skills Assessment  Patient is able to identify current diabetes medications, dosages, and appropriate timing of medications.: yes  Patient reports problems or concerns with current medication regimen.: yes  Medication regimen problems/concerns:: unsure of doses, concerned about side effects (information on meds printed out for pt; states Metformin D/c'd)  Patient is  aware that some diabetes medications can cause low blood sugar?: Yes  Medication Skills Assessment Completed:: Yes  Assessment indicates:: Instruction Needed  Area of need?: Yes    Home Blood Glucose Monitoring  Patient states that blood sugar is checked at home daily.: yes  Monitoring Method:: personal continuous glucose monitor  Personal CGM type::  (restart Dexcom G7 w/ today)  Home Blood Glucose Monitoring Skills Assessment Completed: : Yes  Assessment indicates:: Instruction Needed  Area of need?: Yes    Assessment Summary and  Plan    Based on today's diabetes care assessment, the following areas of need were identified:          9/30/2024    11:23 AM   Areas of Need   Home Blood Glucose Monitoring Yes--see Care Plan       Today's interventions were provided through individual discussion, instruction, and written materials were provided.      Patient verbalized understanding of instruction and written materials.  Pt was able to return back demonstration of instructions today. Patient understood key points, needs reinforcement and further instruction.     Diabetes Self-Management Care Plan:    Today's Diabetes Self-Management Care Plan was developed with Marisol's input. Marisol has agreed to work toward the following goal(s) to improve his/her overall diabetes control.      Care Plan: Diabetes Management   Updates made since 8/31/2024 12:00 AM        Problem: Blood Glucose Self-Monitoring         Goal: Patient will use Dexcom G7 CGM to continuously monitor blood glucose levels daily    Start Date: 1/18/2024   Expected End Date: 4/18/2024   This Visit's Progress: No change   Recent Progress: On track   Priority: Medium   Barriers: No Barriers Identified   Note:    Pt had used Dexcom G6 in the past but has been without it for some time.  Here today to upgrade to G7 and train for its use.    Task: Patient agrees to use and understands  the importance of changing sensor every 10 days (with 12 hour chris period). Pt will use  to follow steps for reinsertion and activation     Task:   Pt aware that 30 minute warm up period required before CGM will provide BG levels and will check BG as needed manually     1-29-24:  Pt was able to change sensor yesterday without any assistance.  Pt did indicate having a small issue but she was able to work it out and get sensor on and working.  Still using reciever    9/30/24--Pt back today for restart/train for Dexcom G7.  Educator provided   Alerts set at 100-300  Supplies from :  [facility]  "[phone]           Follow Up Plan     Follow up in about 1 month (around 10/30/2024) for f/u to download  and send to Dr Isaac.  Also discuss diet more since pt had many questions and mentioned following a "cleanse diet".  Briefly reminded pt that she should be having a "fistful" of carbs per meal, combined with PRO.  Provided another Carb list.  Will discuss more next appt if there is time and pt is not late again.    Today's care plan and follow up schedule was discussed with patient.  Marisol verbalized understanding of the care plan, goals, and agrees to follow up plan.        The patient was encouraged to communicate with his/her health care provider/physician and care team regarding his/her condition(s) and treatment.  I provided the patient with my contact information today and encouraged to contact me via phone or [facility] Patient Portal as needed.     Length of Visit   Total Time: 60 Minutes     "

## 2024-10-21 ENCOUNTER — OFFICE VISIT (OUTPATIENT)
Dept: URGENT CARE | Facility: CLINIC | Age: 64
End: 2024-10-21
Payer: MEDICARE

## 2024-10-21 ENCOUNTER — TELEPHONE (OUTPATIENT)
Dept: DERMATOLOGY | Facility: CLINIC | Age: 64
End: 2024-10-21
Payer: MEDICARE

## 2024-10-21 VITALS
BODY MASS INDEX: 26.84 KG/M2 | OXYGEN SATURATION: 98 % | HEART RATE: 75 BPM | DIASTOLIC BLOOD PRESSURE: 81 MMHG | TEMPERATURE: 98 F | WEIGHT: 167 LBS | SYSTOLIC BLOOD PRESSURE: 215 MMHG | RESPIRATION RATE: 19 BRPM | HEIGHT: 66 IN

## 2024-10-21 DIAGNOSIS — L08.9 SKIN INFECTION: Primary | ICD-10-CM

## 2024-10-21 RX ORDER — MUPIROCIN 20 MG/G
OINTMENT TOPICAL 3 TIMES DAILY
Qty: 22 G | Refills: 0 | Status: SHIPPED | OUTPATIENT
Start: 2024-10-21

## 2024-10-21 RX ORDER — SULFAMETHOXAZOLE AND TRIMETHOPRIM 800; 160 MG/1; MG/1
1 TABLET ORAL 2 TIMES DAILY
Qty: 14 TABLET | Refills: 0 | Status: SHIPPED | OUTPATIENT
Start: 2024-10-21 | End: 2024-10-28

## 2024-10-21 NOTE — PROGRESS NOTES
"Subjective:      Patient ID: Marisol Rosales is a 64 y.o. female.    Vitals:  height is 5' 6" (1.676 m) and weight is 75.8 kg (167 lb). Her oral temperature is 98.1 °F (36.7 °C). Her blood pressure is 215/81 (abnormal) and her pulse is 75. Her respiration is 19 and oxygen saturation is 98%.     Chief Complaint: Rash    Pt states that about two weeks ago she was pulling grass when she noticed an area on her third finger left hand. Pt states that she has been using otc itch medication but states that she is continuing to have itching with a rash and when she bends her finger she has an area that opens. Pt denies any fever or other symptoms     Rash  This is a new problem. The current episode started 1 to 4 weeks ago (2 weeks). The problem is unchanged. The affected locations include the left fingers.       Constitution: Negative.   HENT: Negative.     Neck: neck negative.   Cardiovascular: Negative.    Eyes: Negative.    Respiratory: Negative.     Endocrine: negative.   Genitourinary: Negative.    Musculoskeletal: Negative.    Skin:  Positive for rash and abrasion.   Allergic/Immunologic: Negative.    Neurological: Negative.    Hematologic/Lymphatic: Negative.    Psychiatric/Behavioral: Negative.        Objective:     Physical Exam   Constitutional: She is oriented to person, place, and time.   HENT:   Head: Normocephalic and atraumatic.   Nose: Nose normal.   Eyes: Conjunctivae are normal. Pupils are equal, round, and reactive to light. Extraocular movement intact   Neck: Neck supple.   Cardiovascular: Normal rate and normal pulses.   Pulmonary/Chest: Effort normal.   Abdominal: Normal appearance.   Musculoskeletal: Normal range of motion.         General: Normal range of motion.   Neurological: no focal deficit. She is alert, oriented to person, place, and time and at baseline.   Skin: Skin is rash.         Comments: See image   Psychiatric: Her behavior is normal. Mood, judgment and thought content normal.   Nursing " note and vitals reviewed.      Assessment:     1. Skin infection              Plan:       Skin infection  -     mupirocin (BACTROBAN) 2 % ointment; Apply topically 3 (three) times daily.  Dispense: 22 g; Refill: 0  -     sulfamethoxazole-trimethoprim 800-160mg (BACTRIM DS) 800-160 mg Tab; Take 1 tablet by mouth 2 (two) times daily. for 7 days  Dispense: 14 tablet; Refill: 0

## 2024-10-21 NOTE — TELEPHONE ENCOUNTER
----- Message from Crystal sent at 10/21/2024 10:58 AM CDT -----  Contact: PT  Type:  Sooner Appointment Request    Caller is requesting a sooner appointment.  Caller declined first available appointment listed below.  Caller will not accept being placed on the waitlist and is requesting a message be sent to doctor.    Name of Caller:  PT  When is the first available appointment?  No solutions found  Symptoms:  Symptom: Rash or Redness on One Body Area Only, Skin cracked  Outcome: Schedule an appointment to be seen within 24 hours.  Reason: Caller denied all higher acuity questions    The caller accepted this outcome.  Would the patient rather a call back or a response via [facility]? Call back  Best Call Back Number:  [phone]  Additional Information:  PT would like to be  seen asap

## 2025-01-15 DIAGNOSIS — M05.741 RHEUMATOID ARTHRITIS INVOLVING BOTH HANDS WITH POSITIVE RHEUMATOID FACTOR: Primary | ICD-10-CM

## 2025-01-15 DIAGNOSIS — M05.742 RHEUMATOID ARTHRITIS INVOLVING BOTH HANDS WITH POSITIVE RHEUMATOID FACTOR: Primary | ICD-10-CM

## 2025-01-17 RX ORDER — TOCILIZUMAB-AAZG 162 MG/.9ML
162 INJECTION, SOLUTION SUBCUTANEOUS
Qty: 3.6 ML | Refills: 2 | Status: ACTIVE | OUTPATIENT
Start: 2025-01-17

## 2025-01-17 NOTE — PROGRESS NOTES
Pt's payer for 2025 no longer covering Actemra. Request from [facility] for orders to work up Tyenne. Routed and discussed to provider Dr. Nima Mack. Pt to be seen by Rheum CHUCKIE Dr. Stephens on 2/7.

## 2025-01-27 ENCOUNTER — OFFICE VISIT (OUTPATIENT)
Dept: URGENT CARE | Facility: CLINIC | Age: 65
End: 2025-01-27
Payer: MEDICARE

## 2025-01-27 VITALS
WEIGHT: 167 LBS | SYSTOLIC BLOOD PRESSURE: 157 MMHG | HEIGHT: 66 IN | HEART RATE: 79 BPM | OXYGEN SATURATION: 97 % | BODY MASS INDEX: 26.84 KG/M2 | RESPIRATION RATE: 18 BRPM | DIASTOLIC BLOOD PRESSURE: 80 MMHG | TEMPERATURE: 98 F

## 2025-01-27 DIAGNOSIS — R05.9 COUGH, UNSPECIFIED TYPE: ICD-10-CM

## 2025-01-27 DIAGNOSIS — J32.9 SINUSITIS, UNSPECIFIED CHRONICITY, UNSPECIFIED LOCATION: Primary | ICD-10-CM

## 2025-01-27 LAB
CTP QC/QA: YES
CTP QC/QA: YES
FLUAV AG NPH QL: NEGATIVE
FLUBV AG NPH QL: NEGATIVE
SARS-COV-2 AG RESP QL IA.RAPID: NEGATIVE

## 2025-01-27 PROCEDURE — 99214 OFFICE O/P EST MOD 30 MIN: CPT | Mod: S$GLB,,, | Performed by: NURSE PRACTITIONER

## 2025-01-27 PROCEDURE — 87811 SARS-COV-2 COVID19 W/OPTIC: CPT | Mod: QW,S$GLB,, | Performed by: NURSE PRACTITIONER

## 2025-01-27 PROCEDURE — 87804 INFLUENZA ASSAY W/OPTIC: CPT | Mod: QW,,, | Performed by: NURSE PRACTITIONER

## 2025-01-27 RX ORDER — CETIRIZINE HYDROCHLORIDE 10 MG/1
10 TABLET ORAL DAILY
Qty: 14 TABLET | Refills: 0 | Status: SHIPPED | OUTPATIENT
Start: 2025-01-27 | End: 2026-01-27

## 2025-01-27 RX ORDER — AMOXICILLIN AND CLAVULANATE POTASSIUM 875; 125 MG/1; MG/1
1 TABLET, FILM COATED ORAL EVERY 12 HOURS
Qty: 20 TABLET | Refills: 0 | Status: SHIPPED | OUTPATIENT
Start: 2025-01-27 | End: 2025-02-06

## 2025-01-27 NOTE — PROGRESS NOTES
"Subjective:      Patient ID: Marisol Rosales is a 64 y.o. female.    Vitals:  height is 5' 6" (1.676 m) and weight is 75.8 kg (167 lb). Her temperature is 98.4 °F (36.9 °C). Her blood pressure is 157/80 (abnormal) and her pulse is 79. Her respiration is 18 and oxygen saturation is 97%.     Chief Complaint: Cough    Pt was discharged from hospital x 2 weeks ago. Pt fell while in hospital and has back , neck, buttock pain. Pt is also wanted a refill on her autoinjector for pain    Cough  This is a new problem. The current episode started in the past 7 days (x 2 days). The problem has been gradually worsening. The cough is Productive of purulent sputum. Associated symptoms include chills, headaches, myalgias, nasal congestion, rhinorrhea and sweats.       Constitution: Positive for chills.   Respiratory:  Positive for cough.    Musculoskeletal:  Positive for muscle ache.   Skin:  Negative for erythema.   Neurological:  Positive for headaches.      Objective:     Physical Exam   Constitutional: She is oriented to person, place, and time. She appears well-developed. She is cooperative.  Non-toxic appearance. She does not appear ill. No distress.   HENT:   Head: Normocephalic and atraumatic.   Ears:   Right Ear: Hearing, tympanic membrane, external ear and ear canal normal.   Left Ear: Hearing, tympanic membrane, external ear and ear canal normal.   Nose: Mucosal edema and purulent discharge present. No rhinorrhea, nasal deformity or congestion. No epistaxis. Right sinus exhibits maxillary sinus tenderness. Right sinus exhibits no frontal sinus tenderness. Left sinus exhibits maxillary sinus tenderness. Left sinus exhibits no frontal sinus tenderness.   Mouth/Throat: Uvula is midline, oropharynx is clear and moist and mucous membranes are normal. Mucous membranes are moist. No trismus in the jaw. Normal dentition. No uvula swelling. No oropharyngeal exudate or posterior oropharyngeal erythema. Oropharynx is clear.   Eyes: " Conjunctivae and lids are normal. Pupils are equal, round, and reactive to light. Right eye exhibits no discharge. Left eye exhibits no discharge. No scleral icterus.   Neck: Trachea normal and phonation normal. Neck supple. No neck rigidity present.   Cardiovascular: Normal rate, regular rhythm, normal heart sounds and normal pulses.   Pulmonary/Chest: Effort normal and breath sounds normal. No respiratory distress. She has no wheezes. She has no rhonchi. She has no rales.   Abdominal: Normal appearance and bowel sounds are normal. She exhibits no distension. Soft. There is no abdominal tenderness.   Musculoskeletal: Normal range of motion.         General: Normal range of motion.      Cervical back: She exhibits no tenderness.   Lymphadenopathy:     She has no cervical adenopathy.   Neurological: She is alert and oriented to person, place, and time. She exhibits normal muscle tone.   Skin: Skin is warm, dry, intact, not diaphoretic, not pale and no rash. Capillary refill takes less than 2 seconds. No erythema   Psychiatric: Her speech is normal and behavior is normal. Judgment and thought content normal.   Nursing note and vitals reviewed.      Assessment:     1. Sinusitis, unspecified chronicity, unspecified location    2. Cough, unspecified type        Plan:       Sinusitis, unspecified chronicity, unspecified location    Cough, unspecified type  -     POCT Influenza A/B Rapid Antigen  -     SARS Coronavirus 2 Antigen, POCT Manual Read    Other orders  -     amoxicillin-clavulanate 875-125mg (AUGMENTIN) 875-125 mg per tablet; Take 1 tablet by mouth every 12 (twelve) hours. for 10 days  Dispense: 20 tablet; Refill: 0  -     cetirizine (ZYRTEC) 10 MG tablet; Take 1 tablet (10 mg total) by mouth once daily.  Dispense: 14 tablet; Refill: 0                Labs: covid:  negative flu a and B negative  Take medications as prescribed.  Tylenol/Motrin per package instructions for any pain or fever.  Assure adequate  hydration and rest.  Throat lozenges or Chloraseptic per package instructions for sore throat.    Warm salt water gargles every 2-3 hours as needed for sore throat.    Nasal saline flushes or irrigation as directed for nasal saline congestion and sinus related symptoms.  Follow-up with PCP in 1-2 days.  Return to clinic as needed.  To ED for any new or acutely worsening symptoms.  Patient in agreement with plan of care.

## 2025-01-27 NOTE — PATIENT INSTRUCTIONS
Labs: covid:  negative flu a and B negative  Take medications as prescribed.  Tylenol/Motrin per package instructions for any pain or fever.  Assure adequate hydration and rest.  Throat lozenges or Chloraseptic per package instructions for sore throat.    Warm salt water gargles every 2-3 hours as needed for sore throat.    Nasal saline flushes or irrigation as directed for nasal saline congestion and sinus related symptoms.  Follow-up with PCP in 1-2 days.  Return to clinic as needed.  To ED for any new or acutely worsening symptoms.  Patient in agreement with plan of care.

## 2025-03-13 ENCOUNTER — OFFICE VISIT (OUTPATIENT)
Dept: URGENT CARE | Facility: CLINIC | Age: 65
End: 2025-03-13
Payer: MEDICARE

## 2025-03-13 VITALS
HEART RATE: 81 BPM | RESPIRATION RATE: 18 BRPM | DIASTOLIC BLOOD PRESSURE: 82 MMHG | HEIGHT: 66 IN | OXYGEN SATURATION: 97 % | TEMPERATURE: 98 F | WEIGHT: 167 LBS | SYSTOLIC BLOOD PRESSURE: 158 MMHG | BODY MASS INDEX: 26.84 KG/M2

## 2025-03-13 DIAGNOSIS — L50.9 URTICARIA: Primary | ICD-10-CM

## 2025-03-13 DIAGNOSIS — R35.0 URINARY FREQUENCY: ICD-10-CM

## 2025-03-13 DIAGNOSIS — R30.0 DYSURIA: ICD-10-CM

## 2025-03-13 LAB
BILIRUB UR QL STRIP: NEGATIVE
GLUCOSE UR QL STRIP: POSITIVE
KETONES UR QL STRIP: NEGATIVE
LEUKOCYTE ESTERASE UR QL STRIP: NEGATIVE
PH, POC UA: 6
POC BLOOD, URINE: NEGATIVE
POC NITRATES, URINE: NEGATIVE
PROT UR QL STRIP: NEGATIVE
SP GR UR STRIP: 1.01 (ref 1–1.03)
UROBILINOGEN UR STRIP-ACNC: 0.2 (ref 0.1–1.1)

## 2025-03-13 PROCEDURE — 81003 URINALYSIS AUTO W/O SCOPE: CPT | Mod: QW,S$GLB,, | Performed by: NURSE PRACTITIONER

## 2025-03-13 PROCEDURE — 99214 OFFICE O/P EST MOD 30 MIN: CPT | Mod: S$GLB,,, | Performed by: NURSE PRACTITIONER

## 2025-03-13 RX ORDER — CETIRIZINE HYDROCHLORIDE 10 MG/1
10 TABLET ORAL DAILY
Qty: 30 TABLET | Refills: 0 | Status: SHIPPED | OUTPATIENT
Start: 2025-03-13 | End: 2025-04-12

## 2025-03-13 RX ORDER — HYDROXYZINE PAMOATE 25 MG/1
25 CAPSULE ORAL EVERY 6 HOURS PRN
Qty: 30 CAPSULE | Refills: 0 | Status: SHIPPED | OUTPATIENT
Start: 2025-03-13 | End: 2025-03-23

## 2025-03-13 RX ORDER — DEXAMETHASONE 4 MG/1
4 TABLET ORAL DAILY
Qty: 5 TABLET | Refills: 0 | Status: SHIPPED | OUTPATIENT
Start: 2025-03-13 | End: 2025-03-18

## 2025-03-13 NOTE — PROGRESS NOTES
"Subjective:      Patient ID: Marisol Rosales is a 64 y.o. female.    Vitals:  height is 5' 6" (1.676 m) and weight is 75.8 kg (167 lb). Her temperature is 97.9 °F (36.6 °C). Her blood pressure is 158/82 (abnormal) and her pulse is 81. Her respiration is 18 and oxygen saturation is 97%.     Chief Complaint: No chief complaint on file.    64-year-old female with multiple medical problems and multiple medications is seen today with complaints of hives for the past week.  Patient recalls the rash started after she gave herself her last DMARD injection approximately 1 week ago.  Patient states she has been on this particular medication for several months without any problems.  She denies having any new medications, changes in her diet, skin care or body products at home.  Denies any facial or lip swelling throat swelling, difficulty breathing or wheezing.    Rash  This is a new problem. The current episode started 1 to 4 weeks ago. Pertinent negatives include no fatigue.       Constitution: Negative for chills, sweating and fatigue.   HENT: Negative.  Negative for trouble swallowing.    Neck: neck negative.   Cardiovascular: Negative.  Negative for sob on exertion.   Eyes: Negative.    Respiratory: Negative.  Negative for stridor and wheezing.    Genitourinary:  Positive for frequency. Negative for dysuria and urgency.   Musculoskeletal: Negative.    Skin:  Positive for rash and hives.   Allergic/Immunologic: Positive for hives and itching.   Neurological: Negative.    Hematologic/Lymphatic: Negative.    Psychiatric/Behavioral: Negative.        Objective:     Physical Exam    Assessment:     No diagnosis found.    Plan:       There are no diagnoses linked to this encounter.                "

## 2025-03-13 NOTE — PATIENT INSTRUCTIONS
Patient to take Zyrtec once daily at the onset of hives along with Decadron.    And take Vistaril as needed for breakthrough itching if Zyrtec is not helping    Go to the emergency room for any swelling of her face eyes lips, shortness of breath, wheezing or difficulty breathing    We will place a referral for Dermatology    Patient to follow-up with her primary care provider    Discussed with patient her urine specimen was normal, no blood or signs of infection.  She did have glucose in her urine which is consistent with her taking Jardiance for her type 2 diabetes.  Discussed with patient about keeping a regular check on her blood sugar and documenting the findings so she can share with her primary care provider.  Increase water intake, in can return here if she experiences any dysuria vaginal itching or vaginal discharge.

## 2025-03-26 ENCOUNTER — TELEPHONE (OUTPATIENT)
Dept: RHEUMATOLOGY | Facility: CLINIC | Age: 65
End: 2025-03-26
Payer: MEDICARE

## 2025-03-26 NOTE — TELEPHONE ENCOUNTER
Left voicemail asking patient to contact the office back to schedule an appointment.        ----- Message from Pharmacist Saran sent at 3/24/2025  1:52 PM CDT -----  Regarding: Appt Request  Hi team,Would you be able to assist with scheduling this former Dr. TOLLIVER pt with a provider in the office? I see 3 missed appts this year though unfortunately.She had an urgent care encounter 3/13, RA flare and was told to stop Actemra. She is now asking to start a new medication.Thank you,Jose G

## 2025-04-25 DIAGNOSIS — E11.65 POORLY CONTROLLED DIABETES MELLITUS: ICD-10-CM

## 2025-04-25 DIAGNOSIS — E11.42: Primary | ICD-10-CM

## 2025-04-29 NOTE — PROGRESS NOTES
Subjective:      Patient ID: Marisol Rosales is a 64 y.o. female.    Chief Complaint: Follow up on rheumatoid arthritis    HPI  Marisol Rosales is a 63yo female with h/o HTN and DM2 who presents for follow up on seropositive (+RF, -CCP)  She initially presented to Dr. Esparza in 2019. Prior to this, PCP diagnosed her with rheumatoid arthritis, saw rheum on [place].      -CCP    Previous:  Prednisone  Methotrexate   Humira  Enbrel  Plaquenil  Sulfasalazine   Orencia    Currently:  Gabapentin  Cymbala  Flexeril  Actemra (now Tyenne 2025)    She reports allergy to prednisone tablets. Elevated HR and makes her hallucinate. She does fine with steroid injection.    In January woke up with numbness in R arm. Improved with aspirin. Son took her to hospital to make sure she didn't have a stroke. Passed out on the floor during the night, when she was in the hosp for observation.  L neck has been hurting since.     She was doing fine on Actemra. Has been off of it since January, stopped it in hospital and hasn't been able to restart.  Numbness in B hands has worsened since being off of Actemra.  Pain/swelling sometimes in the mornings.    Review of Systems   Respiratory:  Negative for cough and choking.    Cardiovascular:  Negative for chest pain.   Musculoskeletal:  Positive for arthralgias, joint swelling and myalgias.   Skin:  Negative for pallor and rash.   Neurological:  Positive for numbness.   Psychiatric/Behavioral:  Negative for behavioral problems and confusion.         Objective:   BP (!) 180/82   Pulse 73   Wt 78.7 kg (173 lb 8 oz)   BMI 28.00 kg/m²   Physical Exam   Constitutional: She is oriented to person, place, and time. normal appearance.   Cardiovascular: Normal rate and regular rhythm.   Pulmonary/Chest: Effort normal and breath sounds normal.   Abdominal: Soft.   Musculoskeletal:         General: Tenderness present.   Neurological: She is alert and oriented to person, place, and time.   Skin: Skin is  warm and dry.   Psychiatric: Her behavior is normal. Mood normal.        8/15/2022 1/29/2024 4/19/2024 4/30/2025   Tender (RAMIREZ-28) 2 / 28  11 / 28  10 / 28  20 / 28    Swollen (RAMIREZ-28) 0 / 28 4 / 28 0 / 28 0 / 28    Provider Global 50 / 100 70 / 100 60 / 100 --   Patient Global 80 / 100 70 / 100 70 / 100 --   ESR 9 mm/hr 6 mm/hr 11 mm/hr 7 mm/hr   CRP 1.5 mg/L 1.2 mg/L 0.9 mg/L 1.8 mg/L   RAMIREZ-28 (ESR) 3.45 (Moderate disease activity) 4.65 (Moderate disease activity) 4.43 (Moderate disease activity) --   RAMIREZ-28 (CRP) 3.2 (Moderate disease activity) 4.64 (Moderate disease activity) 3.94 (Moderate disease activity) --   CDAI Score 15  29  23  --        Assessment:     1. Osteoarthritis of cervical spine with myelopathy and radiculopathy    2. Immunosuppression due to drug therapy    3. Rheumatoid arthritis involving multiple sites with positive rheumatoid factor    4. Hammer toes of both feet          Plan:     Problem List Items Addressed This Visit    None  Visit Diagnoses         Osteoarthritis of cervical spine with myelopathy and radiculopathy    -  Primary    Relevant Medications    gabapentin (NEURONTIN) 100 MG capsule    Other Relevant Orders    Ambulatory referral[facility]consult to [facility]      Immunosuppression due to drug therapy        Relevant Orders    Hepatitis B surface antigen (Completed)    HBcAB (Completed)    Hepatitis B surface antibody (Completed)    Hepatitis C antibody (Completed)    Quantiferon Gold TB      Rheumatoid arthritis involving multiple sites with positive rheumatoid factor        Relevant Orders    C-Reactive Protein    Sedimentation rate    CBC Auto Differential    Comprehensive Metabolic Panel      Hammer toes of both feet        Relevant Medications    diclofenac sodium (VOLTAREN) 1 % Gel        Increased tenderness and numbness in hands since being off of tocilizumab for the last 3mos.   Restart Tyenne every 7 days  Start gabapentin 100mg 1-2 tablets HS for cervical  spine radiuclopathy  Advised to start taking daily  Refer now to [facility] program  DMARD and 4 labs today  She wants to hold off on rechecking arthritis survey.     RTO 3mos

## 2025-04-30 ENCOUNTER — OFFICE VISIT (OUTPATIENT)
Dept: RHEUMATOLOGY | Facility: CLINIC | Age: 65
End: 2025-04-30
Payer: MEDICARE

## 2025-04-30 ENCOUNTER — LAB VISIT (OUTPATIENT)
Dept: LAB | Facility: HOSPITAL | Age: 65
End: 2025-04-30
Payer: MEDICARE

## 2025-04-30 ENCOUNTER — TELEPHONE (OUTPATIENT)
Dept: RHEUMATOLOGY | Facility: CLINIC | Age: 65
End: 2025-04-30
Payer: MEDICARE

## 2025-04-30 ENCOUNTER — RESULTS FOLLOW-UP (OUTPATIENT)
Dept: RHEUMATOLOGY | Facility: CLINIC | Age: 65
End: 2025-04-30

## 2025-04-30 VITALS
WEIGHT: 173.5 LBS | BODY MASS INDEX: 28 KG/M2 | SYSTOLIC BLOOD PRESSURE: 180 MMHG | DIASTOLIC BLOOD PRESSURE: 82 MMHG | HEART RATE: 73 BPM

## 2025-04-30 DIAGNOSIS — Z79.899 IMMUNOSUPPRESSION DUE TO DRUG THERAPY: ICD-10-CM

## 2025-04-30 DIAGNOSIS — M47.12 OSTEOARTHRITIS OF CERVICAL SPINE WITH MYELOPATHY AND RADICULOPATHY: Primary | ICD-10-CM

## 2025-04-30 DIAGNOSIS — M05.79 RHEUMATOID ARTHRITIS INVOLVING MULTIPLE SITES WITH POSITIVE RHEUMATOID FACTOR: ICD-10-CM

## 2025-04-30 DIAGNOSIS — D84.821 IMMUNOSUPPRESSION DUE TO DRUG THERAPY: ICD-10-CM

## 2025-04-30 DIAGNOSIS — M20.42 HAMMER TOES OF BOTH FEET: ICD-10-CM

## 2025-04-30 DIAGNOSIS — M05.79 RHEUMATOID ARTHRITIS INVOLVING MULTIPLE SITES WITH POSITIVE RHEUMATOID FACTOR: Primary | ICD-10-CM

## 2025-04-30 DIAGNOSIS — M20.41 HAMMER TOES OF BOTH FEET: ICD-10-CM

## 2025-04-30 DIAGNOSIS — M47.22 OSTEOARTHRITIS OF CERVICAL SPINE WITH MYELOPATHY AND RADICULOPATHY: Primary | ICD-10-CM

## 2025-04-30 LAB
ABSOLUTE EOSINOPHIL (OHS): 0.15 K/UL
ABSOLUTE MONOCYTE (OHS): 0.42 K/UL (ref 0.3–1)
ABSOLUTE NEUTROPHIL COUNT (OHS): 2.23 K/UL (ref 1.8–7.7)
ALBUMIN SERPL BCP-MCNC: 3.6 G/DL (ref 3.5–5.2)
ALP SERPL-CCNC: 185 UNIT/L (ref 40–150)
ALT SERPL W/O P-5'-P-CCNC: 19 UNIT/L (ref 10–44)
ANION GAP (OHS): 9 MMOL/L (ref 8–16)
AST SERPL-CCNC: 13 UNIT/L (ref 11–45)
BASOPHILS # BLD AUTO: 0.03 K/UL
BASOPHILS NFR BLD AUTO: 0.6 %
BILIRUB SERPL-MCNC: 0.4 MG/DL (ref 0.1–1)
BUN SERPL-MCNC: 15 MG/DL (ref 8–23)
CALCIUM SERPL-MCNC: 9.2 MG/DL (ref 8.7–10.5)
CHLORIDE SERPL-SCNC: 102 MMOL/L (ref 95–110)
CO2 SERPL-SCNC: 27 MMOL/L (ref 23–29)
CREAT SERPL-MCNC: 0.9 MG/DL (ref 0.5–1.4)
CRP SERPL-MCNC: 1.8 MG/L
ERYTHROCYTE [DISTWIDTH] IN BLOOD BY AUTOMATED COUNT: 14.1 % (ref 11.5–14.5)
ERYTHROCYTE [SEDIMENTATION RATE] IN BLOOD BY PHOTOMETRIC METHOD: 7 MM/HR
GFR SERPLBLD CREATININE-BSD FMLA CKD-EPI: >60 ML/MIN/1.73/M2
GLUCOSE SERPL-MCNC: 451 MG/DL (ref 70–110)
HBV CORE AB SERPL QL IA: NORMAL
HBV SURFACE AB SER-ACNC: <3 MIU/ML
HBV SURFACE AB SERPL IA-ACNC: NORMAL M[IU]/ML
HBV SURFACE AG SERPL QL IA: NORMAL
HCT VFR BLD AUTO: 39.7 % (ref 37–48.5)
HCV AB SERPL QL IA: NORMAL
HGB BLD-MCNC: 12.4 GM/DL (ref 12–16)
IMM GRANULOCYTES # BLD AUTO: 0.01 K/UL (ref 0–0.04)
IMM GRANULOCYTES NFR BLD AUTO: 0.2 % (ref 0–0.5)
LYMPHOCYTES # BLD AUTO: 1.82 K/UL (ref 1–4.8)
MCH RBC QN AUTO: 24.4 PG (ref 27–31)
MCHC RBC AUTO-ENTMCNC: 31.2 G/DL (ref 32–36)
MCV RBC AUTO: 78 FL (ref 82–98)
NUCLEATED RBC (/100WBC) (OHS): 0 /100 WBC
PLATELET # BLD AUTO: 297 K/UL (ref 150–450)
PMV BLD AUTO: 9.6 FL (ref 9.2–12.9)
POTASSIUM SERPL-SCNC: 4.1 MMOL/L (ref 3.5–5.1)
PROT SERPL-MCNC: 7.4 GM/DL (ref 6–8.4)
RBC # BLD AUTO: 5.08 M/UL (ref 4–5.4)
RELATIVE EOSINOPHIL (OHS): 3.2 %
RELATIVE LYMPHOCYTE (OHS): 39.1 % (ref 18–48)
RELATIVE MONOCYTE (OHS): 9 % (ref 4–15)
RELATIVE NEUTROPHIL (OHS): 47.9 % (ref 38–73)
SODIUM SERPL-SCNC: 138 MMOL/L (ref 136–145)
WBC # BLD AUTO: 4.66 K/UL (ref 3.9–12.7)

## 2025-04-30 PROCEDURE — 86480 TB TEST CELL IMMUN MEASURE: CPT

## 2025-04-30 PROCEDURE — G2211 COMPLEX E/M VISIT ADD ON: HCPCS | Mod: S$GLB,,,

## 2025-04-30 PROCEDURE — 1159F MED LIST DOCD IN RCRD: CPT | Mod: CPTII,S$GLB,,

## 2025-04-30 PROCEDURE — 3077F SYST BP >= 140 MM HG: CPT | Mod: CPTII,S$GLB,,

## 2025-04-30 PROCEDURE — 4010F ACE/ARB THERAPY RXD/TAKEN: CPT | Mod: CPTII,S$GLB,,

## 2025-04-30 PROCEDURE — 86704 HEP B CORE ANTIBODY TOTAL: CPT

## 2025-04-30 PROCEDURE — 85652 RBC SED RATE AUTOMATED: CPT

## 2025-04-30 PROCEDURE — 86803 HEPATITIS C AB TEST: CPT

## 2025-04-30 PROCEDURE — 85025 COMPLETE CBC W/AUTO DIFF WBC: CPT

## 2025-04-30 PROCEDURE — 36415 COLL VENOUS BLD VENIPUNCTURE: CPT

## 2025-04-30 PROCEDURE — 86706 HEP B SURFACE ANTIBODY: CPT | Mod: 59

## 2025-04-30 PROCEDURE — 86140 C-REACTIVE PROTEIN: CPT

## 2025-04-30 PROCEDURE — 1160F RVW MEDS BY RX/DR IN RCRD: CPT | Mod: CPTII,S$GLB,,

## 2025-04-30 PROCEDURE — 87340 HEPATITIS B SURFACE AG IA: CPT

## 2025-04-30 PROCEDURE — 3046F HEMOGLOBIN A1C LEVEL >9.0%: CPT | Mod: CPTII,S$GLB,,

## 2025-04-30 PROCEDURE — 80053 COMPREHEN METABOLIC PANEL: CPT

## 2025-04-30 PROCEDURE — 99214 OFFICE O/P EST MOD 30 MIN: CPT | Mod: S$GLB,,,

## 2025-04-30 PROCEDURE — 3079F DIAST BP 80-89 MM HG: CPT | Mod: CPTII,S$GLB,,

## 2025-04-30 PROCEDURE — 3008F BODY MASS INDEX DOCD: CPT | Mod: CPTII,S$GLB,,

## 2025-04-30 PROCEDURE — 99999 PR PBB SHADOW E&M-EST. PATIENT-LVL V: CPT | Mod: PBBFAC,,,

## 2025-04-30 RX ORDER — FLUCONAZOLE 150 MG/1
TABLET ORAL
COMMUNITY
Start: 2025-04-18

## 2025-04-30 RX ORDER — GABAPENTIN 100 MG/1
CAPSULE ORAL
Qty: 60 CAPSULE | Refills: 3 | Status: SHIPPED | OUTPATIENT
Start: 2025-04-30

## 2025-04-30 RX ORDER — DICLOFENAC SODIUM 10 MG/G
2 GEL TOPICAL 4 TIMES DAILY
Qty: 1 EACH | Refills: 2 | Status: SHIPPED | OUTPATIENT
Start: 2025-04-30

## 2025-04-30 RX ORDER — TOCILIZUMAB-AAZG 162 MG/.9ML
162 INJECTION, SOLUTION SUBCUTANEOUS
Qty: 3.6 ML | Refills: 2 | Status: SHIPPED | OUTPATIENT
Start: 2025-04-30

## 2025-04-30 RX ORDER — LORATADINE 10 MG/1
TABLET ORAL
COMMUNITY

## 2025-04-30 RX ORDER — FAMOTIDINE 20 MG/1
20 TABLET, FILM COATED ORAL
COMMUNITY
Start: 2025-04-23 | End: 2026-04-23

## 2025-04-30 NOTE — TELEPHONE ENCOUNTER
Called and spoke to pt about critical lab of 451. I advised her to report to ED for elevated blood sugar, to prevent diabetic ketoacidosis. She reports she has left the hospital and is on her way home, and will check blood sugar level as soon as she is able to. I advised that if the BS level is still 400+, then report back to hospital. She states she did not take her DM medicine this morning. Pt states she is aware and understands.

## 2025-05-01 LAB
MITOGEN MINUS NIL (OHS): 9.96
NIL TB SYNCED (OHS): 0.04
QUANTIFERON GOLD INTERP (OHS): NEGATIVE
TB1 AG MINUS NIL (OHS): 0.01
TB2 AG MINUS NIL (OHS): 0.05

## 2025-07-24 NOTE — PROCEDURES
EP catheter inserted at the coronary sinus. PROCEDURE NOTE      PRE-OP DIAGNOSIS: right nasal alar lesion    POST-OP DIAGNOSIS: same    PROCEDURE(S): shave excision right intranasal alar lesion     SURGEON: Eugene Parra    ASSISTANT: N/A    ANESTHESIA: local < 1 ml of 1% lidocaine w/ epi 1:100,000    PATIENT CONDITION: good    ESTIMATED BLOOD LOSS: scant    SPECIMEN(S): right nasal lesion    COMPLICATIONS: none    DRAIN(S): n/a    FINDINGS: see prior exam/photo, unchanged    PROCEDURE:  After informed consent was obtained in which all risks, benefits limitations and alternatives of surgery were discussed specifically including the risk of recurrence, need for additional more aggressive surgery, infection, bleeding, loss of tissue, patient elected to proceed with surgery.  Nasal vestibule swabbed on the right side with Betadine and infiltrated intranasally with 1% lidocaine with epinephrine at 27 gauge needle to talib the entire area around the raised nodule.  The area was then shaved with a 15 blade grossly removing it just below the surface of the adjacent skin surface.  It was sent without orientation for permanent histopathological analysis in formalin.  Direct pressure and silver nitrate resulted in hemostasis.  Bactroban ointment was applied.  Aftercare instructions were reviewed and provided in printed form.

## 2025-08-22 ENCOUNTER — TELEPHONE (OUTPATIENT)
Dept: RHEUMATOLOGY | Facility: CLINIC | Age: 65
End: 2025-08-22
Payer: MEDICARE

## 2025-09-03 ENCOUNTER — OFFICE VISIT (OUTPATIENT)
Dept: URGENT CARE | Facility: CLINIC | Age: 65
End: 2025-09-03
Payer: MEDICARE

## 2025-09-03 VITALS
SYSTOLIC BLOOD PRESSURE: 195 MMHG | BODY MASS INDEX: 29.49 KG/M2 | HEIGHT: 65 IN | DIASTOLIC BLOOD PRESSURE: 86 MMHG | WEIGHT: 177 LBS | OXYGEN SATURATION: 99 % | TEMPERATURE: 98 F | HEART RATE: 85 BPM | RESPIRATION RATE: 20 BRPM

## 2025-09-03 DIAGNOSIS — J02.9 SORE THROAT: ICD-10-CM

## 2025-09-03 DIAGNOSIS — J01.10 ACUTE NON-RECURRENT FRONTAL SINUSITIS: Primary | ICD-10-CM

## 2025-09-03 LAB
CTP QC/QA: YES
FLUAV AG NPH QL: NEGATIVE
FLUBV AG NPH QL: NEGATIVE
S PYO RRNA THROAT QL PROBE: NEGATIVE
SARS-COV+SARS-COV-2 AG RESP QL IA.RAPID: NEGATIVE

## 2025-09-03 RX ORDER — CETIRIZINE HYDROCHLORIDE 10 MG/1
10 TABLET ORAL DAILY
Qty: 14 TABLET | Refills: 0 | Status: SHIPPED | OUTPATIENT
Start: 2025-09-03 | End: 2025-09-17

## 2025-09-03 RX ORDER — PROMETHAZINE HYDROCHLORIDE AND DEXTROMETHORPHAN HYDROBROMIDE 6.25; 15 MG/5ML; MG/5ML
5 SYRUP ORAL EVERY 4 HOURS PRN
Qty: 118 ML | Refills: 0 | Status: SHIPPED | OUTPATIENT
Start: 2025-09-03 | End: 2025-09-13

## 2025-09-03 RX ORDER — AMOXICILLIN AND CLAVULANATE POTASSIUM 875; 125 MG/1; MG/1
1 TABLET, FILM COATED ORAL EVERY 12 HOURS
Qty: 20 TABLET | Refills: 0 | Status: SHIPPED | OUTPATIENT
Start: 2025-09-03 | End: 2025-09-13

## 2025-09-03 RX ORDER — FLUTICASONE PROPIONATE 50 MCG
2 SPRAY, SUSPENSION (ML) NASAL DAILY
Qty: 15.8 ML | Refills: 0 | Status: SHIPPED | OUTPATIENT
Start: 2025-09-03